# Patient Record
Sex: FEMALE | Race: WHITE | NOT HISPANIC OR LATINO | Employment: FULL TIME | URBAN - METROPOLITAN AREA
[De-identification: names, ages, dates, MRNs, and addresses within clinical notes are randomized per-mention and may not be internally consistent; named-entity substitution may affect disease eponyms.]

---

## 2017-05-08 ENCOUNTER — TELEPHONE (OUTPATIENT)
Dept: MEDICAL GROUP | Facility: PHYSICIAN GROUP | Age: 37
End: 2017-05-08

## 2017-05-08 NOTE — TELEPHONE ENCOUNTER
ESTABLISHED PATIENT PRE-VISIT PLANNING     Note: Patient will not be contacted if there is no indication to call.     1.  Reviewed note from last office visit with PCP and/or other med group provider: Yes    2.  If any orders were placed at last visit, do we have Results/Consult Notes?        •  Labs - Labs ordered, completed and results are in chart.       •  Imaging - Imaging was not ordered at last office visit.       •  Referrals - No referrals were ordered at last office visit.    3.  Immunizations were updated in Epic using WebIZ?: Yes       •  Web Iz Recommendations: Patient is up to date on all vaccines    4.  Patient is due for the following Health Maintenance Topics:   There are no preventive care reminders to display for this patient.

## 2017-05-18 ENCOUNTER — HOSPITAL ENCOUNTER (OUTPATIENT)
Facility: MEDICAL CENTER | Age: 37
End: 2017-05-18
Attending: FAMILY MEDICINE
Payer: COMMERCIAL

## 2017-05-18 ENCOUNTER — OFFICE VISIT (OUTPATIENT)
Dept: MEDICAL GROUP | Facility: PHYSICIAN GROUP | Age: 37
End: 2017-05-18
Payer: COMMERCIAL

## 2017-05-18 VITALS
TEMPERATURE: 99 F | RESPIRATION RATE: 18 BRPM | HEART RATE: 62 BPM | SYSTOLIC BLOOD PRESSURE: 122 MMHG | HEIGHT: 67 IN | OXYGEN SATURATION: 97 % | BODY MASS INDEX: 43.47 KG/M2 | WEIGHT: 277 LBS | DIASTOLIC BLOOD PRESSURE: 86 MMHG

## 2017-05-18 DIAGNOSIS — L65.0 TELOGEN EFFLUVIUM: ICD-10-CM

## 2017-05-18 DIAGNOSIS — L91.8 SKIN TAG: ICD-10-CM

## 2017-05-18 DIAGNOSIS — N89.8 VAGINAL DISCHARGE: ICD-10-CM

## 2017-05-18 DIAGNOSIS — M79.671 BILATERAL FOOT PAIN: ICD-10-CM

## 2017-05-18 DIAGNOSIS — M79.672 BILATERAL FOOT PAIN: ICD-10-CM

## 2017-05-18 DIAGNOSIS — R20.9 SKIN SENSATION DISTURBANCE: ICD-10-CM

## 2017-05-18 DIAGNOSIS — Z30.432 ENCOUNTER FOR IUD REMOVAL: ICD-10-CM

## 2017-05-18 LAB
CANDIDA DNA VAG QL PROBE+SIG AMP: NEGATIVE
G VAGINALIS DNA VAG QL PROBE+SIG AMP: NEGATIVE
T VAGINALIS DNA VAG QL PROBE+SIG AMP: NEGATIVE

## 2017-05-18 PROCEDURE — 87510 GARDNER VAG DNA DIR PROBE: CPT

## 2017-05-18 PROCEDURE — 58301 REMOVE INTRAUTERINE DEVICE: CPT | Performed by: FAMILY MEDICINE

## 2017-05-18 PROCEDURE — 17110 DESTRUCTION B9 LES UP TO 14: CPT | Performed by: FAMILY MEDICINE

## 2017-05-18 PROCEDURE — 87591 N.GONORRHOEAE DNA AMP PROB: CPT

## 2017-05-18 PROCEDURE — 87491 CHLMYD TRACH DNA AMP PROBE: CPT

## 2017-05-18 PROCEDURE — 87660 TRICHOMONAS VAGIN DIR PROBE: CPT

## 2017-05-18 PROCEDURE — 87480 CANDIDA DNA DIR PROBE: CPT

## 2017-05-18 PROCEDURE — 99214 OFFICE O/P EST MOD 30 MIN: CPT | Mod: 25 | Performed by: FAMILY MEDICINE

## 2017-05-18 RX ORDER — ALPRAZOLAM 0.5 MG/1
0.5 TABLET ORAL
COMMUNITY
End: 2017-10-30

## 2017-05-18 ASSESSMENT — PATIENT HEALTH QUESTIONNAIRE - PHQ9: CLINICAL INTERPRETATION OF PHQ2 SCORE: 1

## 2017-05-18 NOTE — MR AVS SNAPSHOT
"        Modesta Coreyruss   2017 8:20 AM   Office Visit   MRN: [id number]    Department:  [facility]   Dept Phone:  [phone]    Description:  Female : 1980   Provider:  Quynh Joel M.D.           Reason for Visit     Foot Pain bilateral foot pain    Alopecia           Allergies as of 2017     Allergen Noted Reactions    Erythromycin 2010       Pcn [Penicillins] 2010   Hives      Vital Signs     Blood Pressure Pulse Temperature Respirations Height Weight    122/86 mmHg 62 37.2 °C (99 °F) 18 1.702 m (5' 7.01\") 125.646 kg (277 lb)    Body Mass Index Oxygen Saturation Smoking Status             43.37 kg/m2 97% Never Smoker          Basic Information     Date Of Birth Sex Race Ethnicity Preferred Language    1980 Female White Non- English      Problem List              ICD-10-CM Priority Class Noted - Resolved    Anxiety and depression F41.9, F32.9   2015 - Present    Ocular migraine G43.109   2015 - Present    Chronic diarrhea K52.9   2015 - Present    Missed menses N92.6   3/16/2016 - Present      Health Maintenance        Date Due Completion Dates    PAP SMEAR 2018, 2015, 2014 (Prv Comp)    Override on 2014: Previously completed (hx of abnormal PAP)    COLONOSCOPY 10/11/2022 10/11/2012    IMM DTaP/Tdap/Td Vaccine (2 - Td) 10/26/2026 10/26/2016            Current Immunizations     MMR/Varicella Combined Vaccine  Incomplete    Tdap Vaccine  Incomplete, 10/26/2016    Tuberculin Skin Test 2011      Below and/or attached are the medications your provider expects you to take. Review all of your home medications and newly ordered medications with your provider and/or pharmacist. Follow medication instructions as directed by your provider and/or pharmacist. Please keep your medication list with you and share with your provider. Update the information when medications are discontinued, doses are changed, or new medications " (including over-the-counter products) are added; and carry medication information at all times in the event of emergency situations     Allergies:  ERYTHROMYCIN - (reactions not documented)     PCN - Hives               Medications  Valid as of: May 18, 2017 -  8:46 AM    Generic Name Brand Name Tablet Size Instructions for use    ALPRAZolam (Tab) XANAX 0.5 MG Take 0.5 mg by mouth at bedtime as needed for Sleep.        FLUoxetine HCl (Cap) PROZAC 10 MG Take 40 mg by mouth 2 times a day.        Hydrocodone-Acetaminophen (Tab) NORCO 5-325 MG Take 1 Tab by mouth every 6 hours as needed.        Oxycodone-Acetaminophen (Tab) PERCOCET 5-325 MG Take 1 Tab by mouth every four hours as needed (pain).        .                 Medicines prescribed today were sent to:     [email or web address] [address] [place] [address], [place] - [address] [facility]    [email or web address] [place] [address]    Phone: [phone] Fax: [phone]    Open 24 Hours?: No      Medication refill instructions:       If your prescription bottle indicates you have medication refills left, it is not necessary to call your provider’s office. Please contact your pharmacy and they will refill your medication.    If your prescription bottle indicates you do not have any refills left, you may request refills at any time through one of the following ways: The online [employer] system (except Urgent Care), by calling your provider’s office, or by asking your pharmacy to contact your provider’s office with a refill request. Medication refills are processed only during regular business hours and may not be available until the next business day. Your provider may request additional information or to have a follow-up visit with you prior to refilling your medication.   *Please Note: Medication refills are assigned a new Rx number when refilled electronically. Your pharmacy may indicate that no refills were authorized even though a new prescription for the same  medication is available at the pharmacy. Please request the medicine by name with the pharmacy before contacting your provider for a refill.           MyChart Access Code: Activation code not generated  Current MyChart Status: Active

## 2017-05-18 NOTE — PROGRESS NOTES
Chief Complaint   Patient presents with   • Foot Pain     bilateral foot pain   • Alopecia       HISTORY OF PRESENT ILLNESS: Patient is a 37 y.o. female established patient here today for the following concerns:    1. Vaginal discharge  Here today with discharge that is mal-odorous.  She reports that she had an IUD placed post-partum in the winter, but has had intermittent bleeding and discomfort in addition to the discharge.  She has appointment to have the IUD removed in one month, but feels she may not be able to wait that long.  No pelvic pain, no fevers.  She is not currently sexually active.     2. Encounter for IUD removal  Requests removal.      3. Skin tag  Small skin tag under the right breast that is catching on clothing.      4. Skin sensation disturbance  As above.     5. Telogen effluvium  Reports post-partum has had significant hair loss globally. Concerned that she is balding.  Not taking any additional supplements or vitamins.     Also reports soreness in the feet bilaterally since child birth/pregnancy last fall.  Weight has been up an additional 20 lbs.  No injuries specifically.  Occasional swelling.  No numbness or tingling.       Past Medical, Social, and Family history reviewed and updated in EPIC    Allergies:Erythromycin and Pcn    Current Outpatient Prescriptions   Medication Sig Dispense Refill   • alprazolam (XANAX) 0.5 MG Tab Take 0.5 mg by mouth at bedtime as needed for Sleep.     • fluoxetine (PROZAC) 10 MG Cap Take 40 mg by mouth 2 times a day.     • hydrocodone-acetaminophen (NORCO) 5-325 MG Tab per tablet Take 1 Tab by mouth every 6 hours as needed. 10 Tab 0   • oxycodone-acetaminophen (PERCOCET) 5-325 MG Tab Take 1 Tab by mouth every four hours as needed (pain). 30 Tab 0     No current facility-administered medications for this visit.         ROS:  Review of Systems   Constitutional: Negative for fever, chills, weight loss and malaise/fatigue.   HENT: Negative for ear pain,  "nosebleeds, congestion, sore throat and neck pain.    Eyes: Negative for blurred vision.   Respiratory: Negative for cough, sputum production, shortness of breath and wheezing.    Cardiovascular: Negative for chest pain, palpitations,  and leg swelling.   Gastrointestinal: Negative for heartburn, nausea, vomiting, diarrhea and abdominal pain.   Genitourinary: Negative for dysuria, urgency and frequency.   Musculoskeletal: Negative for myalgias, back pain and joint pain.   Skin: Negative for rash and itching.   Neurological: Negative for dizziness, tingling, tremors, sensory change, focal weakness and headaches.   Endo/Heme/Allergies: Does not bruise/bleed easily.   Psychiatric/Behavioral: Negative for depression, anxiety, suicidal ideas, insomnia and memory loss.      Exam:  Blood pressure 122/86, pulse 62, temperature 37.2 °C (99 °F), resp. rate 18, height 1.702 m (5' 7.01\"), weight 125.646 kg (277 lb), SpO2 97 %.    General:  Well nourished, well developed in NAD  Head is grossly normal.  Neck: Supple without JVD   Pulmonary:  Normal effort.   Cardiovascular: Regular rate  Extremities: no clubbing, cyanosis, or edema.  Psych: affect appropriate  : speculum exam shows copious yellow-green discharge eminating from the cervical os.  Strings of IUD visible.  IUD easily removed intact.  Cultures obtained. GC/Chl sent.    No CMT.    Skin tag under the right breast fold.    CRYOTHERAPY: With patient's permission. The pedunculated skin tag Lesion was identified and 3 rounds of liquid nitrogen was used in a 30 second freeze thaw cycle.  Patient tolerated the procedure well.     Please note that this dictation was created using voice recognition software. I have made every reasonable attempt to correct obvious errors, but I expect that there are errors of grammar and possibly content that I did not discover before finalizing the note.    Assessment/Plan:  1. Vaginal discharge  Follow BV culture results and GC/CHL.  No " evidence of PID.      2. Encounter for IUD removal  Removed.  Discussed that she is now fertile and should keep her gyn appointment to discuss other options for contraception.    3. Skin tag  Cryo today.     4. Skin sensation disturbance  As above    5. Telogen effluvium  Counseled on normal post-partum hair loss.  Should continue to improve without intervention.    6. Bilateral foot pain  Discussed likely 2/2 to weight changes.  Proper foot wear.  Myofascial release with golf ball.  If not improving may consult with podiatry for inserts.      Follow up prn

## 2017-05-19 LAB
C TRACH DNA SPEC QL NAA+PROBE: NEGATIVE
N GONORRHOEA DNA SPEC QL NAA+PROBE: NEGATIVE
SPECIMEN SOURCE: NORMAL

## 2017-05-30 ENCOUNTER — OFFICE VISIT (OUTPATIENT)
Dept: MEDICAL GROUP | Facility: PHYSICIAN GROUP | Age: 37
End: 2017-05-30
Payer: COMMERCIAL

## 2017-05-30 VITALS
TEMPERATURE: 97.7 F | DIASTOLIC BLOOD PRESSURE: 82 MMHG | OXYGEN SATURATION: 97 % | HEIGHT: 67 IN | WEIGHT: 277 LBS | RESPIRATION RATE: 18 BRPM | HEART RATE: 56 BPM | SYSTOLIC BLOOD PRESSURE: 118 MMHG | BODY MASS INDEX: 43.47 KG/M2

## 2017-05-30 DIAGNOSIS — L91.8 SKIN TAG: ICD-10-CM

## 2017-05-30 PROCEDURE — 99213 OFFICE O/P EST LOW 20 MIN: CPT | Performed by: NURSE PRACTITIONER

## 2017-05-30 RX ORDER — DOXYCYCLINE HYCLATE 100 MG
100 TABLET ORAL 2 TIMES DAILY
Qty: 20 TAB | Refills: 0 | Status: SHIPPED | OUTPATIENT
Start: 2017-05-30 | End: 2017-09-06

## 2017-05-30 NOTE — PROGRESS NOTES
"Subjective:     Chief Complaint   Patient presents with   • Follow-Up     Skin tag infection       HPI:  Modesta Archuleta is a 37 y.o. female here today to discuss the following:    Skin tag  Patient had cryotherapy to a skin tag on her bra line on the right breast in May 18, 2017. Lesion is still intact and is draining foul smelling, light green colored drainage. She denies any fever or chills           Current medicines (including changes today)  Current Outpatient Prescriptions   Medication Sig Dispense Refill   • doxycycline (VIBRAMYCIN) 100 MG Tab Take 1 Tab by mouth 2 times a day. 20 Tab 0   • alprazolam (XANAX) 0.5 MG Tab Take 0.5 mg by mouth at bedtime as needed for Sleep.     • fluoxetine (PROZAC) 10 MG Cap Take 40 mg by mouth 2 times a day.     • hydrocodone-acetaminophen (NORCO) 5-325 MG Tab per tablet Take 1 Tab by mouth every 6 hours as needed. 10 Tab 0   • oxycodone-acetaminophen (PERCOCET) 5-325 MG Tab Take 1 Tab by mouth every four hours as needed (pain). 30 Tab 0     No current facility-administered medications for this visit.       She  has a past medical history of Ocular migraine (2/12/2015); Anxiety and depression (2/12/2015); and Chronic diarrhea (2/12/2015). She also has no past medical history of Diabetes or ASTHMA.    ROS   Review of Systems   Constitutional: Negative for fever, chills, weight loss and malaise/fatigue.   HENT: Negative for ear pain, nosebleeds, congestion, sore throat and neck pain.    Respiratory: Negative for cough, sputum production, shortness of breath and wheezing.    Cardiovascular: Negative for chest pain, palpitations,  and leg swelling.   Gastrointestinal: Negative for heartburn, nausea, vomiting, diarrhea and abdominal pain.   Skin: Positive for skin infection   All other systems reviewed and are negative except as in HPI.     Objective:   Physical Exam:  Blood pressure 118/82, pulse 56, temperature 36.5 °C (97.7 °F), resp. rate 18, height 1.702 m (5' 7.01\"), weight " 125.646 kg (277 lb), SpO2 97 %. Body mass index is 43.37 kg/(m^2).   General:  Well nourished, well developed in NAD  Head is grossly normal.  Neck: Supple without JVD   Pulmonary:  Normal effort.  Cardiovascular: Regular rate   Extremities: no clubbing, cyanosis, or edema.   Skin: Skin tag under right breast erythematous with purulent drainage  Assessment and Plan:   The following treatment plan was discussed   1. Skin tag  doxycycline (VIBRAMYCIN) 100 MG Tab    follow-up at completion of antibiotics for removal of skin tag    Followup: Return if symptoms worsen or fail to improve, for following antibiotics for skin tag removal.   Please note that this dictation was created using voice recognition software. I have made every reasonable attempt to correct obvious errors, but I expect that there are errors of grammar and possibly content that I did not discover before finalizing the note.

## 2017-05-30 NOTE — PATIENT INSTRUCTIONS
Doxycycline tablets or capsules  What is this medicine?  DOXYCYCLINE (dox monet gaines) is a tetracycline antibiotic. It kills certain bacteria or stops their growth. It is used to treat many kinds of infections, like dental, skin, respiratory, and urinary tract infections. It also treats acne, Lyme disease, malaria, and certain sexually transmitted infections.  This medicine may be used for other purposes; ask your health care provider or pharmacist if you have questions.  COMMON BRAND NAME(S): Adoxa CK, Adoxa Jonny, Adoxa TT, Adoxa, Alodox, Avidoxy, Doxal, Monodox, Morgidox 1x Kit, Morgidox 1x, Morgidox 2x , Morgidox 2x Kit, Ocudox , Vibra-Tabs, Vibramycin  What should I tell my health care provider before I take this medicine?  They need to know if you have any of these conditions:  -liver disease  -long exposure to sunlight like working outdoors  -stomach problems like colitis  -an unusual or allergic reaction to doxycycline, tetracycline antibiotics, other medicines, foods, dyes, or preservatives  -pregnant or trying to get pregnant  -breast-feeding  How should I use this medicine?  Take this medicine by mouth with a full glass of water. Follow the directions on the prescription label. It is best to take this medicine without food, but if it upsets your stomach take it with food. Take your medicine at regular intervals. Do not take your medicine more often than directed. Take all of your medicine as directed even if you think you are better. Do not skip doses or stop your medicine early.  Talk to your pediatrician regarding the use of this medicine in children. Special care may be needed. While this drug may be prescribed for children as young as 8 years old for selected conditions, precautions do apply.  Overdosage: If you think you have taken too much of this medicine contact a poison control center or emergency room at once.  NOTE: This medicine is only for you. Do not share this medicine with others.  What if  I miss a dose?  If you miss a dose, take it as soon as you can. If it is almost time for your next dose, take only that dose. Do not take double or extra doses.  What may interact with this medicine?  -antacids  -barbiturates  -birth control pills  -bismuth subsalicylate  -carbamazepine  -methoxyflurane  -other antibiotics  -phenytoin  -vitamins that contain iron  -warfarin  This list may not describe all possible interactions. Give your health care provider a list of all the medicines, herbs, non-prescription drugs, or dietary supplements you use. Also tell them if you smoke, drink alcohol, or use illegal drugs. Some items may interact with your medicine.  What should I watch for while using this medicine?  Tell your doctor or health care professional if your symptoms do not improve.  Do not treat diarrhea with over the counter products. Contact your doctor if you have diarrhea that lasts more than 2 days or if it is severe and watery.  Do not take this medicine just before going to bed. It may not dissolve properly when you lay down and can cause pain in your throat. Drink plenty of fluids while taking this medicine to also help reduce irritation in your throat.  This medicine can make you more sensitive to the sun. Keep out of the sun. If you cannot avoid being in the sun, wear protective clothing and use sunscreen. Do not use sun lamps or tanning beds/booths.  Birth control pills may not work properly while you are taking this medicine. Talk to your doctor about using an extra method of birth control.  If you are being treated for a sexually transmitted infection, avoid sexual contact until you have finished your treatment. Your sexual partner may also need treatment.  Avoid antacids, aluminum, calcium, magnesium, and iron products for 4 hours before and 2 hours after taking a dose of this medicine.  If you are using this medicine to prevent malaria, you should still protect yourself from contact with mosquitos.  Stay in screened-in areas, use mosquito nets, keep your body covered, and use an insect repellent.  What side effects may I notice from receiving this medicine?  Side effects that you should report to your doctor or health care professional as soon as possible:  -allergic reactions like skin rash, itching or hives, swelling of the face, lips, or tongue  -difficulty breathing  -fever  -itching in the rectal or genital area  -pain on swallowing  -redness, blistering, peeling or loosening of the skin, including inside the mouth  -severe stomach pain or cramps  -unusual bleeding or bruising  -unusually weak or tired  -yellowing of the eyes or skin  Side effects that usually do not require medical attention (report to your doctor or health care professional if they continue or are bothersome):  -diarrhea  -loss of appetite  -nausea, vomiting  This list may not describe all possible side effects. Call your doctor for medical advice about side effects. You may report side effects to FDA at 7-618-FDA-4756.  Where should I keep my medicine?  Keep out of the reach of children.  Store at room temperature, below 30 degrees C (86 degrees F). Protect from light. Keep container tightly closed. Throw away any unused medicine after the expiration date. Taking this medicine after the expiration date can make you seriously ill.  NOTE: This sheet is a summary. It may not cover all possible information. If you have questions about this medicine, talk to your doctor, pharmacist, or health care provider.  © 2014, [employer]. (4/7/2009 4:53:02 PM)

## 2017-05-30 NOTE — ASSESSMENT & PLAN NOTE
Patient had cryotherapy to a skin tag on her bra line on the right breast in May 18, 2017. Lesion is still intact and is draining foul smelling, light green colored drainage. She denies any fever or chills

## 2017-05-30 NOTE — MR AVS SNAPSHOT
"        Modesta Coreyruss   2017 11:40 AM   Office Visit   MRN: [id number]    Department:  [facility]   Dept Phone:  [phone]    Description:  Female : 1980   Provider:  LINWOOD Perez           Reason for Visit     Follow-Up Skin tag infection      Allergies as of 2017     Allergen Noted Reactions    Erythromycin 2010       Pcn [Penicillins] 2010   Hives      You were diagnosed with     Skin tag   [2018]         Vital Signs     Blood Pressure Pulse Temperature Respirations Height Weight    118/82 mmHg 56 36.5 °C (97.7 °F) 18 1.702 m (5' 7.01\") 125.646 kg (277 lb)    Body Mass Index Oxygen Saturation Smoking Status             43.37 kg/m2 97% Never Smoker          Basic Information     Date Of Birth Sex Race Ethnicity Preferred Language    1980 Female White Non- English      Problem List              ICD-10-CM Priority Class Noted - Resolved    Anxiety and depression F41.9, F32.9   2015 - Present    Ocular migraine G43.109   2015 - Present    Chronic diarrhea K52.9   2015 - Present    Skin tag L91.8   2017 - Present      Health Maintenance        Date Due Completion Dates    PAP SMEAR 2018, 2015, 2014 (Prv Comp)    Override on 2014: Previously completed (hx of abnormal PAP)    COLONOSCOPY 10/11/2022 10/11/2012    IMM DTaP/Tdap/Td Vaccine (2 - Td) 10/26/2026 10/26/2016            Current Immunizations     MMR/Varicella Combined Vaccine  Incomplete    Tdap Vaccine  Incomplete, 10/26/2016    Tuberculin Skin Test 2011      Below and/or attached are the medications your provider expects you to take. Review all of your home medications and newly ordered medications with your provider and/or pharmacist. Follow medication instructions as directed by your provider and/or pharmacist. Please keep your medication list with you and share with your provider. Update the information when medications are " discontinued, doses are changed, or new medications (including over-the-counter products) are added; and carry medication information at all times in the event of emergency situations     Allergies:  ERYTHROMYCIN - (reactions not documented)     PCN - Hives               Medications  Valid as of: May 30, 2017 -  2:25 PM    Generic Name Brand Name Tablet Size Instructions for use    ALPRAZolam (Tab) XANAX 0.5 MG Take 0.5 mg by mouth at bedtime as needed for Sleep.        Doxycycline Hyclate (Tab) VIBRAMYCIN 100 MG Take 1 Tab by mouth 2 times a day.        FLUoxetine HCl (Cap) PROZAC 10 MG Take 40 mg by mouth 2 times a day.        Hydrocodone-Acetaminophen (Tab) NORCO 5-325 MG Take 1 Tab by mouth every 6 hours as needed.        Oxycodone-Acetaminophen (Tab) PERCOCET 5-325 MG Take 1 Tab by mouth every four hours as needed (pain).        .                 Medicines prescribed today were sent to:     [facility] [address] [place] [address] [address] [facility] [place] [address] Charge Payment [facility] [address]    Phone: [phone] Fax: [phone]    Open 24 Hours?: No      Medication refill instructions:       If your prescription bottle indicates you have medication refills left, it is not necessary to call your provider’s office. Please contact your pharmacy and they will refill your medication.    If your prescription bottle indicates you do not have any refills left, you may request refills at any time through one of the following ways: The online [employer] system (except Urgent Care), by calling your provider’s office, or by asking your pharmacy to contact your provider’s office with a refill request. Medication refills are processed only during regular business hours and may not be available until the next business day. Your provider may request additional information or to have a follow-up visit with you prior to refilling your medication.   *Please Note: Medication refills are assigned a new Rx  number when refilled electronically. Your pharmacy may indicate that no refills were authorized even though a new prescription for the same medication is available at the pharmacy. Please request the medicine by name with the pharmacy before contacting your provider for a refill.        Instructions    Doxycycline tablets or capsules  What is this medicine?  DOXYCYCLINE (dox monet gaines) is a tetracycline antibiotic. It kills certain bacteria or stops their growth. It is used to treat many kinds of infections, like dental, skin, respiratory, and urinary tract infections. It also treats acne, Lyme disease, malaria, and certain sexually transmitted infections.  This medicine may be used for other purposes; ask your health care provider or pharmacist if you have questions.  COMMON BRAND NAME(S): Adoxa CK, Adoxa Jonny, Adoxa TT, Adoxa, Alodox, Avidoxy, Doxal, Monodox, Morgidox 1x Kit, Morgidox 1x, Morgidox 2x , Morgidox 2x Kit, Ocudox , Vibra-Tabs, Vibramycin  What should I tell my health care provider before I take this medicine?  They need to know if you have any of these conditions:  -liver disease  -long exposure to sunlight like working outdoors  -stomach problems like colitis  -an unusual or allergic reaction to doxycycline, tetracycline antibiotics, other medicines, foods, dyes, or preservatives  -pregnant or trying to get pregnant  -breast-feeding  How should I use this medicine?  Take this medicine by mouth with a full glass of water. Follow the directions on the prescription label. It is best to take this medicine without food, but if it upsets your stomach take it with food. Take your medicine at regular intervals. Do not take your medicine more often than directed. Take all of your medicine as directed even if you think you are better. Do not skip doses or stop your medicine early.  Talk to your pediatrician regarding the use of this medicine in children. Special care may be needed. While this drug may be  prescribed for children as young as 8 years old for selected conditions, precautions do apply.  Overdosage: If you think you have taken too much of this medicine contact a poison control center or emergency room at once.  NOTE: This medicine is only for you. Do not share this medicine with others.  What if I miss a dose?  If you miss a dose, take it as soon as you can. If it is almost time for your next dose, take only that dose. Do not take double or extra doses.  What may interact with this medicine?  -antacids  -barbiturates  -birth control pills  -bismuth subsalicylate  -carbamazepine  -methoxyflurane  -other antibiotics  -phenytoin  -vitamins that contain iron  -warfarin  This list may not describe all possible interactions. Give your health care provider a list of all the medicines, herbs, non-prescription drugs, or dietary supplements you use. Also tell them if you smoke, drink alcohol, or use illegal drugs. Some items may interact with your medicine.  What should I watch for while using this medicine?  Tell your doctor or health care professional if your symptoms do not improve.  Do not treat diarrhea with over the counter products. Contact your doctor if you have diarrhea that lasts more than 2 days or if it is severe and watery.  Do not take this medicine just before going to bed. It may not dissolve properly when you lay down and can cause pain in your throat. Drink plenty of fluids while taking this medicine to also help reduce irritation in your throat.  This medicine can make you more sensitive to the sun. Keep out of the sun. If you cannot avoid being in the sun, wear protective clothing and use sunscreen. Do not use sun lamps or tanning beds/booths.  Birth control pills may not work properly while you are taking this medicine. Talk to your doctor about using an extra method of birth control.  If you are being treated for a sexually transmitted infection, avoid sexual contact until you have finished  your treatment. Your sexual partner may also need treatment.  Avoid antacids, aluminum, calcium, magnesium, and iron products for 4 hours before and 2 hours after taking a dose of this medicine.  If you are using this medicine to prevent malaria, you should still protect yourself from contact with mosquitos. Stay in screened-in areas, use mosquito nets, keep your body covered, and use an insect repellent.  What side effects may I notice from receiving this medicine?  Side effects that you should report to your doctor or health care professional as soon as possible:  -allergic reactions like skin rash, itching or hives, swelling of the face, lips, or tongue  -difficulty breathing  -fever  -itching in the rectal or genital area  -pain on swallowing  -redness, blistering, peeling or loosening of the skin, including inside the mouth  -severe stomach pain or cramps  -unusual bleeding or bruising  -unusually weak or tired  -yellowing of the eyes or skin  Side effects that usually do not require medical attention (report to your doctor or health care professional if they continue or are bothersome):  -diarrhea  -loss of appetite  -nausea, vomiting  This list may not describe all possible side effects. Call your doctor for medical advice about side effects. You may report side effects to FDA at 9-603-FDA-3472.  Where should I keep my medicine?  Keep out of the reach of children.  Store at room temperature, below 30 degrees C (86 degrees F). Protect from light. Keep container tightly closed. Throw away any unused medicine after the expiration date. Taking this medicine after the expiration date can make you seriously ill.  NOTE: This sheet is a summary. It may not cover all possible information. If you have questions about this medicine, talk to your doctor, pharmacist, or health care provider.  © 2014, Elsevier/Gold Standard. (4/7/2009 4:53:02 PM)            [employer] Access Code: Activation code not generated  Current  MyChart Status: Active

## 2017-06-11 ENCOUNTER — OFFICE VISIT (OUTPATIENT)
Dept: URGENT CARE | Facility: PHYSICIAN GROUP | Age: 37
End: 2017-06-11
Payer: COMMERCIAL

## 2017-06-11 VITALS
TEMPERATURE: 97.5 F | HEART RATE: 96 BPM | RESPIRATION RATE: 16 BRPM | OXYGEN SATURATION: 97 % | BODY MASS INDEX: 42.28 KG/M2 | WEIGHT: 270 LBS

## 2017-06-11 DIAGNOSIS — K12.0 CANKER SORES ORAL: ICD-10-CM

## 2017-06-11 DIAGNOSIS — J06.9 VIRAL URI WITH COUGH: ICD-10-CM

## 2017-06-11 PROCEDURE — 99214 OFFICE O/P EST MOD 30 MIN: CPT | Performed by: NURSE PRACTITIONER

## 2017-06-11 RX ORDER — ACYCLOVIR 400 MG/1
800 TABLET ORAL 2 TIMES DAILY
Qty: 28 TAB | Refills: 0 | Status: SHIPPED | OUTPATIENT
Start: 2017-06-11 | End: 2017-06-18

## 2017-06-11 ASSESSMENT — ENCOUNTER SYMPTOMS
CHILLS: 0
WHEEZING: 0
RHINORRHEA: 1
SHORTNESS OF BREATH: 0
SORE THROAT: 1
FEVER: 0
SINUS PAIN: 1
SPUTUM PRODUCTION: 0
COUGH: 1

## 2017-06-11 NOTE — MR AVS SNAPSHOT
Modesta Geremias   2017 9:45 AM   Office Visit   MRN: [id number]    Department:  [facility]   Dept Phone:  [phone]    Description:  Female : 1980   Provider:  LINWOOD Morillo           Reason for Visit     Nasal Congestion x 5 days      Allergies as of 2017     Allergen Noted Reactions    Erythromycin 2010       Pcn [Penicillins] 2010   Hives      You were diagnosed with     Canker sores oral   [[id number]]       Viral URI with cough   [098202]         Vital Signs     Pulse Temperature Respirations Weight Oxygen Saturation Smoking Status    96 36.4 °C (97.5 °F) 16 122.471 kg (270 lb) 97% Never Smoker       Basic Information     Date Of Birth Sex Race Ethnicity Preferred Language    1980 Female White Non- English      Your appointments     2017  8:20 AM   Established Patient with LINWOOD Perez   [address] [address] [phone]           You will be receiving a confirmation call a few days before your appointment from our automated call confirmation system.   Please bring to your appointment; a photo ID, copies of your insurance card, all medication bottles and any co-pay that you are responsible for.  Please allow 45-60 minutes to complete your scheduled appointment.              Problem List              ICD-10-CM Priority Class Noted - Resolved    Anxiety and depression F41.9, F32.9   2015 - Present    Ocular migraine G43.109   2015 - Present    Chronic diarrhea K52.9   2015 - Present    Skin tag L91.8   2017 - Present      Health Maintenance        Date Due Completion Dates    PAP SMEAR 2018, 2015, 2014 (Prv Comp)    Override on 2014: Previously completed (hx of abnormal PAP)    COLONOSCOPY 10/11/2022 10/11/2012    IMM DTaP/Tdap/Td Vaccine (2 - Td) 10/26/2026 10/26/2016            Current Immunizations       MMR/Varicella Combined Vaccine  Incomplete    Tdap Vaccine  Incomplete, 10/26/2016    Tuberculin Skin Test 8/30/2011      Below and/or attached are the medications your provider expects you to take. Review all of your home medications and newly ordered medications with your provider and/or pharmacist. Follow medication instructions as directed by your provider and/or pharmacist. Please keep your medication list with you and share with your provider. Update the information when medications are discontinued, doses are changed, or new medications (including over-the-counter products) are added; and carry medication information at all times in the event of emergency situations     Allergies:  ERYTHROMYCIN - (reactions not documented)     PCN - Hives               Medications  Valid as of: June 11, 2017 - 10:23 AM    Generic Name Brand Name Tablet Size Instructions for use    Acyclovir (Tab) ZOVIRAX 400 MG Take 2 Tabs by mouth 2 times a day for 7 days.        ALPRAZolam (Tab) XANAX 0.5 MG Take 0.5 mg by mouth at bedtime as needed for Sleep.        Doxycycline Hyclate (Tab) VIBRAMYCIN 100 MG Take 1 Tab by mouth 2 times a day.        FLUoxetine HCl (Cap) PROZAC 10 MG Take 40 mg by mouth 2 times a day.        Hydrocodone-Acetaminophen (Tab) NORCO 5-325 MG Take 1 Tab by mouth every 6 hours as needed.        Oxycodone-Acetaminophen (Tab) PERCOCET 5-325 MG Take 1 Tab by mouth every four hours as needed (pain).        .                 Medicines prescribed today were sent to:     [facility] [id number] [place] [address] - [facility]    [address] [facility] [address]    Phone: [phone] Fax: [phone]    Open 24 Hours?: No      Medication refill instructions:       If your prescription bottle indicates you have medication refills left, it is not necessary to call your provider’s office. Please contact your pharmacy and they will refill your medication.    If your prescription bottle  indicates you do not have any refills left, you may request refills at any time through one of the following ways: The online [employer] system (except Urgent Care), by calling your provider’s office, or by asking your pharmacy to contact your provider’s office with a refill request. Medication refills are processed only during regular business hours and may not be available until the next business day. Your provider may request additional information or to have a follow-up visit with you prior to refilling your medication.   *Please Note: Medication refills are assigned a new Rx number when refilled electronically. Your pharmacy may indicate that no refills were authorized even though a new prescription for the same medication is available at the pharmacy. Please request the medicine by name with the pharmacy before contacting your provider for a refill.           [employer] Access Code: Activation code not generated  Current [employer] Status: Active

## 2017-06-11 NOTE — PROGRESS NOTES
Subjective:      Modesta Archuleta is a 37 y.o. female who presents with Nasal Congestion            URI   This is a new problem. Episode onset: 5 days ago. Her son has also been sick, she thinks she caught it from him. The problem has been gradually worsening. There has been no fever. Associated symptoms include congestion, coughing, rhinorrhea, sinus pain, sneezing and a sore throat. Pertinent negatives include no wheezing. Associated symptoms comments: She also reports a canker sore has developed in the last two days on the inside of her lower lip that is very painful. She is also currently on doxycycline for a skin tag removal from her PCP. She has tried antihistamine, decongestant and NSAIDs for the symptoms. The treatment provided mild relief.       Review of Systems   Constitutional: Positive for malaise/fatigue. Negative for fever and chills.   HENT: Positive for congestion, rhinorrhea, sneezing and sore throat.    Respiratory: Positive for cough. Negative for sputum production, shortness of breath and wheezing.    All other systems reviewed and are negative.    Past Medical History   Diagnosis Date   • Ocular migraine 2/12/2015   • Anxiety and depression 2/12/2015   • Chronic diarrhea 2/12/2015      Past Surgical History   Procedure Laterality Date   • Arthroscopy, knee     • Tonsillectomy     • Primary c section  11/13/2016     Procedure: PRIMARY C SECTION;  Surgeon: Delphine Carbone M.D.;  Location: LABOR AND DELIVERY;  Service:       Social History     Social History   • Marital Status: Single     Spouse Name: N/A   • Number of Children: N/A   • Years of Education: N/A     Occupational History   • Not on file.     Social History Main Topics   • Smoking status: Never Smoker    • Smokeless tobacco: Never Used   • Alcohol Use: Yes      Comment: socially    • Drug Use: No   • Sexual Activity: Not on file      Comment:      Other Topics Concern   • Not on file     Social History Narrative           Objective:     Pulse 96  Temp(Src) 36.4 °C (97.5 °F)  Resp 16  Wt 122.471 kg (270 lb)  SpO2 97%     Physical Exam   Constitutional: She is oriented to person, place, and time. Vital signs are normal. She appears well-developed and well-nourished.   HENT:   Head: Normocephalic and atraumatic.   Right Ear: Tympanic membrane and external ear normal.   Left Ear: Tympanic membrane and external ear normal.   Nose: Rhinorrhea present.   Mouth/Throat: Oral lesions present. Posterior oropharyngeal erythema present.   Single, large oral lesion anterior buccal mucosa, lower lip   Eyes: EOM are normal. Pupils are equal, round, and reactive to light.   Neck: Normal range of motion.   Cardiovascular: Normal rate and regular rhythm.    Pulmonary/Chest: Effort normal and breath sounds normal.   Musculoskeletal: Normal range of motion.   Lymphadenopathy:        Head (right side): Submandibular adenopathy present.        Head (left side): Submandibular adenopathy present.   Neurological: She is alert and oriented to person, place, and time.   Skin: Skin is warm and dry.   Psychiatric: She has a normal mood and affect. Her speech is normal and behavior is normal. Thought content normal.   Vitals reviewed.              Assessment/Plan:     1. Viral URI with cough    2. Canker sores oral  - acyclovir (ZOVIRAX) 400 MG tablet; Take 2 Tabs by mouth 2 times a day for 7 days.  Dispense: 28 Tab; Refill: 0    Continue OTC cold remedies  May also use oral ambesol for pain relief r/t canker sore  Warm salt water gargles  Drink plenty of water  Supportive care, differential diagnoses, and indications for immediate follow-up discussed with patient.    Pathogenesis of diagnosis discussed including typical length and natural progression.      Instructed to return to  or nearest emergency department if symptoms fail to improve, for any change in condition, further concerns, or new concerning symptoms.  Patient states understanding of the plan  of care and discharge instructions.

## 2017-06-14 ENCOUNTER — OFFICE VISIT (OUTPATIENT)
Dept: MEDICAL GROUP | Facility: PHYSICIAN GROUP | Age: 37
End: 2017-06-14
Payer: COMMERCIAL

## 2017-06-14 ENCOUNTER — HOSPITAL ENCOUNTER (OUTPATIENT)
Dept: RADIOLOGY | Facility: MEDICAL CENTER | Age: 37
End: 2017-06-14
Attending: NURSE PRACTITIONER
Payer: COMMERCIAL

## 2017-06-14 VITALS
DIASTOLIC BLOOD PRESSURE: 82 MMHG | WEIGHT: 274.6 LBS | HEIGHT: 67 IN | TEMPERATURE: 98.2 F | HEART RATE: 72 BPM | RESPIRATION RATE: 16 BRPM | BODY MASS INDEX: 43.1 KG/M2 | SYSTOLIC BLOOD PRESSURE: 126 MMHG | OXYGEN SATURATION: 98 %

## 2017-06-14 DIAGNOSIS — M79.672 LEFT FOOT PAIN: ICD-10-CM

## 2017-06-14 DIAGNOSIS — L91.8 SKIN TAG: ICD-10-CM

## 2017-06-14 PROCEDURE — 99214 OFFICE O/P EST MOD 30 MIN: CPT | Performed by: NURSE PRACTITIONER

## 2017-06-14 PROCEDURE — 73630 X-RAY EXAM OF FOOT: CPT | Mod: LT

## 2017-06-14 NOTE — PROGRESS NOTES
Subjective:     Chief Complaint   Patient presents with   • Other     skin tag removal    • Orders Needed     Dr Dinero        HPI:  Modesta Archuleta is a 37 y.o. female here today to discuss the following:    Left foot pain  Patient reports left pain for more than one year, that started when she was pregnant. Pain increases with walking and is worse in the morning after resting. She denies any trauma or injury and is aware that increased weight contributes to her pain. She is requesting a referral to Dr. Dinero in podiatry          Skin tag  Patient still has small skin tag under her right breast which was previously treated with cryotherapy and became infected. Skin has healed well and tag has reduced in size. She is not going to seek further treatment at this time         Current medicines (including changes today)  Current Outpatient Prescriptions   Medication Sig Dispense Refill   • acyclovir (ZOVIRAX) 400 MG tablet Take 2 Tabs by mouth 2 times a day for 7 days. 28 Tab 0   • doxycycline (VIBRAMYCIN) 100 MG Tab Take 1 Tab by mouth 2 times a day. 20 Tab 0   • alprazolam (XANAX) 0.5 MG Tab Take 0.5 mg by mouth at bedtime as needed for Sleep.     • fluoxetine (PROZAC) 10 MG Cap Take 40 mg by mouth 2 times a day.     • hydrocodone-acetaminophen (NORCO) 5-325 MG Tab per tablet Take 1 Tab by mouth every 6 hours as needed. 10 Tab 0   • oxycodone-acetaminophen (PERCOCET) 5-325 MG Tab Take 1 Tab by mouth every four hours as needed (pain). 30 Tab 0     No current facility-administered medications for this visit.       She  has a past medical history of Ocular migraine (2/12/2015); Anxiety and depression (2/12/2015); and Chronic diarrhea (2/12/2015). She also has no past medical history of Diabetes or ASTHMA.    ROS   Review of Systems   Constitutional: Negative for fever, chills, weight loss and malaise/fatigue.   HENT: Negative for ear pain, nosebleeds, congestion, sore throat and neck pain.    Respiratory: Negative for  "cough, sputum production, shortness of breath and wheezing.    Cardiovascular: Negative for chest pain, palpitations,  and leg swelling.   MS Left foot pain  Skin: Skin infection has resolved and skin tag is smaller in size  Neurological: Negative for dizziness, tingling, tremors, sensory change, focal weakness and headaches.   All other systems reviewed and are negative except as in HPI.     Objective:   Physical Exam:  Blood pressure 126/82, pulse 72, temperature 36.8 °C (98.2 °F), resp. rate 16, height 1.702 m (5' 7.01\"), weight 124.558 kg (274 lb 9.6 oz), SpO2 98 %. Body mass index is 43 kg/(m^2).   Physical Exam:  Alert, oriented in no acute distress.  Eye contact is good, speech goal directed, affect calm  HEENT: conjunctiva non-injected, sclera non-icteric.  Pinna normal.   Neck No adenopathy or masses in the neck or supraclavicular regions.  Lungs: clear to auscultation bilaterally with good excursion.  CV: regular rate and rhythm.   Abdomen: soft, nontender, No CVAT. Normal bowel sounds.  Ext: no edema, color normal, vascularity normal, temperature normal  MS: Normal gait and station   Left foot pain increases with inversion and dorsiflexion. No pain with rotation  Skin: No erythema, edema or drainage.  Assessment and Plan:   The following treatment plan was discussed   1. Left foot pain  REFERRAL TO PODIATRY    DX-FOOT-COMPLETE 3+ LEFT   2. Skin tag         Followup: Return if symptoms worsen or fail to improve.   Please note that this dictation was created using voice recognition software. I have made every reasonable attempt to correct obvious errors, but I expect that there are errors of grammar and possibly content that I did not discover before finalizing the note.             "

## 2017-06-14 NOTE — MR AVS SNAPSHOT
"        Modesta Geremias   2017 10:40 AM   Office Visit   MRN: [id number]    Department:  [facility]   Dept Phone:  [phone]    Description:  Female : 1980   Provider:  LINWOOD Perez           Reason for Visit     Other skin tag removal     Orders Needed Dr Dinero       Allergies as of 2017     Allergen Noted Reactions    Erythromycin 2010       Pcn [Penicillins] 2010   Hives      You were diagnosed with     Left foot pain   [[id number]]   X-ray left foot. Referral to podiatry    Skin tag   [[id number]]         Vital Signs     Blood Pressure Pulse Temperature Respirations Height Weight    126/82 mmHg 72 36.8 °C (98.2 °F) 16 1.702 m (5' 7.01\") 124.558 kg (274 lb 9.6 oz)    Body Mass Index Oxygen Saturation Smoking Status             43.00 kg/m2 98% Never Smoker          Basic Information     Date Of Birth Sex Race Ethnicity Preferred Language    1980 Female White Non- English      Problem List              ICD-10-CM Priority Class Noted - Resolved    Anxiety and depression F41.9, F32.9   2015 - Present    Ocular migraine G43.109   2015 - Present    Chronic diarrhea K52.9   2015 - Present    Skin tag L91.8   2017 - Present    Left foot pain M79.672   2017 - Present      Health Maintenance        Date Due Completion Dates    PAP SMEAR 2018, 2015, 2014 (Prv Comp)    Override on 2014: Previously completed (hx of abnormal PAP)    COLONOSCOPY 10/11/2022 10/11/2012    IMM DTaP/Tdap/Td Vaccine (2 - Td) 10/26/2026 10/26/2016            Current Immunizations     MMR/Varicella Combined Vaccine  Incomplete    Tdap Vaccine  Incomplete, 10/26/2016    Tuberculin Skin Test 2011      Below and/or attached are the medications your provider expects you to take. Review all of your home medications and newly ordered medications with your provider and/or pharmacist. Follow medication instructions as directed by your " provider and/or pharmacist. Please keep your medication list with you and share with your provider. Update the information when medications are discontinued, doses are changed, or new medications (including over-the-counter products) are added; and carry medication information at all times in the event of emergency situations     Allergies:  ERYTHROMYCIN - (reactions not documented)     PCN - Hives               Medications  Valid as of: June 14, 2017 -  1:26 PM    Generic Name Brand Name Tablet Size Instructions for use    Acyclovir (Tab) ZOVIRAX 400 MG Take 2 Tabs by mouth 2 times a day for 7 days.        ALPRAZolam (Tab) XANAX 0.5 MG Take 0.5 mg by mouth at bedtime as needed for Sleep.        Doxycycline Hyclate (Tab) VIBRAMYCIN 100 MG Take 1 Tab by mouth 2 times a day.        FLUoxetine HCl (Cap) PROZAC 10 MG Take 40 mg by mouth 2 times a day.        Hydrocodone-Acetaminophen (Tab) NORCO 5-325 MG Take 1 Tab by mouth every 6 hours as needed.        Oxycodone-Acetaminophen (Tab) PERCOCET 5-325 MG Take 1 Tab by mouth every four hours as needed (pain).        .                 Medicines prescribed today were sent to:     [facility] [id number] [place] [address], [place] - [address] [facility]    [address] [facility] [place] [address]    Phone: [phone] Fax: [phone]    Open 24 Hours?: No      Medication refill instructions:       If your prescription bottle indicates you have medication refills left, it is not necessary to call your provider’s office. Please contact your pharmacy and they will refill your medication.    If your prescription bottle indicates you do not have any refills left, you may request refills at any time through one of the following ways: The online [employer] system (except Urgent Care), by calling your provider’s office, or by asking your pharmacy to contact your provider’s office with a refill request. Medication refills are processed only during regular business hours and may  not be available until the next business day. Your provider may request additional information or to have a follow-up visit with you prior to refilling your medication.   *Please Note: Medication refills are assigned a new Rx number when refilled electronically. Your pharmacy may indicate that no refills were authorized even though a new prescription for the same medication is available at the pharmacy. Please request the medicine by name with the pharmacy before contacting your provider for a refill.        Your To Do List     Future Labs/Procedures Complete By Expires    DX-FOOT-COMPLETE 3+ LEFT  As directed 12/15/2017      Referral     A referral request has been sent to our patient care coordination department. Please allow 3-5 business days for us to process this request and contact you either by phone or mail. If you do not hear from us by the 5th business day, please call us at [phone].           [employer] Protective Access Code: Activation code not generated  Current [employer] Protective Status: Active

## 2017-06-14 NOTE — ASSESSMENT & PLAN NOTE
Patient still has small skin tag under her right breast which was previously treated with cryotherapy and became infected. Skin has healed well and tag has reduced in size. She is not going to seek further treatment at this time

## 2017-06-14 NOTE — ASSESSMENT & PLAN NOTE
Patient reports left pain for more than one year, that started when she was pregnant. Pain increases with walking and is worse in the morning after resting. She denies any trauma or injury and is aware that increased weight contributes to her pain. She is requesting a referral to Dr. Dinero in podiatry

## 2017-09-06 ENCOUNTER — HOSPITAL ENCOUNTER (EMERGENCY)
Facility: MEDICAL CENTER | Age: 37
End: 2017-09-08
Payer: COMMERCIAL

## 2017-09-06 ENCOUNTER — RESOLUTE PROFESSIONAL BILLING HOSPITAL PROF FEE (OUTPATIENT)
Dept: HOSPITALIST | Facility: MEDICAL CENTER | Age: 37
End: 2017-09-06
Payer: COMMERCIAL

## 2017-09-06 ENCOUNTER — APPOINTMENT (OUTPATIENT)
Dept: RADIOLOGY | Facility: MEDICAL CENTER | Age: 37
End: 2017-09-06
Attending: EMERGENCY MEDICINE
Payer: COMMERCIAL

## 2017-09-06 ENCOUNTER — HOSPITAL ENCOUNTER (INPATIENT)
Facility: MEDICAL CENTER | Age: 37
LOS: 2 days | DRG: 204 | End: 2017-09-08
Attending: HOSPITALIST | Admitting: HOSPITALIST
Payer: COMMERCIAL

## 2017-09-06 ENCOUNTER — HOSPITAL ENCOUNTER (EMERGENCY)
Facility: MEDICAL CENTER | Age: 37
End: 2017-09-06
Attending: EMERGENCY MEDICINE
Payer: COMMERCIAL

## 2017-09-06 VITALS
HEART RATE: 64 BPM | TEMPERATURE: 97.2 F | RESPIRATION RATE: 25 BRPM | HEIGHT: 67 IN | OXYGEN SATURATION: 98 % | SYSTOLIC BLOOD PRESSURE: 149 MMHG | DIASTOLIC BLOOD PRESSURE: 93 MMHG | BODY MASS INDEX: 42.7 KG/M2 | WEIGHT: 272.05 LBS

## 2017-09-06 DIAGNOSIS — R06.09 DOE (DYSPNEA ON EXERTION): ICD-10-CM

## 2017-09-06 DIAGNOSIS — R79.89 ELEVATED TROPONIN: ICD-10-CM

## 2017-09-06 PROBLEM — E87.6 HYPOKALEMIA: Status: ACTIVE | Noted: 2017-09-06

## 2017-09-06 PROBLEM — A08.4 VIRAL GASTROENTERITIS: Status: ACTIVE | Noted: 2017-09-06

## 2017-09-06 PROBLEM — E83.39 HYPOPHOSPHATEMIA: Status: ACTIVE | Noted: 2017-09-06

## 2017-09-06 LAB
ALBUMIN SERPL BCP-MCNC: 3.6 G/DL (ref 3.2–4.9)
ALBUMIN/GLOB SERPL: 1.1 G/DL
ALP SERPL-CCNC: 85 U/L (ref 30–99)
ALT SERPL-CCNC: 21 U/L (ref 2–50)
ANION GAP SERPL CALC-SCNC: 9 MMOL/L (ref 0–11.9)
APTT PPP: 21.7 SEC (ref 24.7–36)
AST SERPL-CCNC: 16 U/L (ref 12–45)
BASOPHILS # BLD AUTO: 0.5 % (ref 0–1.8)
BASOPHILS # BLD: 0.06 K/UL (ref 0–0.12)
BILIRUB SERPL-MCNC: 0.5 MG/DL (ref 0.1–1.5)
BNP SERPL-MCNC: 243 PG/ML (ref 0–100)
BUN SERPL-MCNC: 12 MG/DL (ref 8–22)
CALCIUM SERPL-MCNC: 8.3 MG/DL (ref 8.4–10.2)
CHLORIDE SERPL-SCNC: 106 MMOL/L (ref 96–112)
CO2 SERPL-SCNC: 24 MMOL/L (ref 20–33)
CREAT SERPL-MCNC: 0.95 MG/DL (ref 0.5–1.4)
DEPRECATED D DIMER PPP IA-ACNC: <200 NG/ML(D-DU)
EKG IMPRESSION: NORMAL
EOSINOPHIL # BLD AUTO: 0.4 K/UL (ref 0–0.51)
EOSINOPHIL NFR BLD: 3.4 % (ref 0–6.9)
ERYTHROCYTE [DISTWIDTH] IN BLOOD BY AUTOMATED COUNT: 44 FL (ref 35.9–50)
GFR SERPL CREATININE-BSD FRML MDRD: >60 ML/MIN/1.73 M 2
GLOBULIN SER CALC-MCNC: 3.4 G/DL (ref 1.9–3.5)
GLUCOSE SERPL-MCNC: 90 MG/DL (ref 65–99)
HCG SERPL QL: NEGATIVE
HCT VFR BLD AUTO: 39.6 % (ref 37–47)
HGB BLD-MCNC: 13.2 G/DL (ref 12–16)
IMM GRANULOCYTES # BLD AUTO: 0.04 K/UL (ref 0–0.11)
IMM GRANULOCYTES NFR BLD AUTO: 0.3 % (ref 0–0.9)
INR PPP: 0.94 (ref 0.87–1.13)
LIPASE SERPL-CCNC: 18 U/L (ref 7–58)
LYMPHOCYTES # BLD AUTO: 2.59 K/UL (ref 1–4.8)
LYMPHOCYTES NFR BLD: 22.2 % (ref 22–41)
MAGNESIUM SERPL-MCNC: 1.9 MG/DL (ref 1.5–2.5)
MCH RBC QN AUTO: 29.3 PG (ref 27–33)
MCHC RBC AUTO-ENTMCNC: 33.3 G/DL (ref 33.6–35)
MCV RBC AUTO: 87.8 FL (ref 81.4–97.8)
MONOCYTES # BLD AUTO: 0.73 K/UL (ref 0–0.85)
MONOCYTES NFR BLD AUTO: 6.2 % (ref 0–13.4)
NEUTROPHILS # BLD AUTO: 7.87 K/UL (ref 2–7.15)
NEUTROPHILS NFR BLD: 67.4 % (ref 44–72)
NRBC # BLD AUTO: 0 K/UL
NRBC BLD AUTO-RTO: 0 /100 WBC
PHOSPHATE SERPL-MCNC: 2.4 MG/DL (ref 2.5–4.5)
PLATELET # BLD AUTO: 305 K/UL (ref 164–446)
PMV BLD AUTO: 10 FL (ref 9–12.9)
POTASSIUM SERPL-SCNC: 3.3 MMOL/L (ref 3.6–5.5)
PROT SERPL-MCNC: 7 G/DL (ref 6–8.2)
PROTHROMBIN TIME: 12.4 SEC (ref 12–14.6)
RBC # BLD AUTO: 4.51 M/UL (ref 4.2–5.4)
SODIUM SERPL-SCNC: 139 MMOL/L (ref 135–145)
T4 FREE SERPL-MCNC: 0.73 NG/DL (ref 0.58–1.64)
TROPONIN I SERPL-MCNC: 0.13 NG/ML (ref 0–0.04)
TSH SERPL DL<=0.005 MIU/L-ACNC: 2.5 UIU/ML (ref 0.35–5.5)
WBC # BLD AUTO: 11.7 K/UL (ref 4.8–10.8)

## 2017-09-06 PROCEDURE — 84443 ASSAY THYROID STIM HORMONE: CPT

## 2017-09-06 PROCEDURE — 84100 ASSAY OF PHOSPHORUS: CPT

## 2017-09-06 PROCEDURE — 96361 HYDRATE IV INFUSION ADD-ON: CPT

## 2017-09-06 PROCEDURE — 94760 N-INVAS EAR/PLS OXIMETRY 1: CPT

## 2017-09-06 PROCEDURE — 85025 COMPLETE CBC W/AUTO DIFF WBC: CPT

## 2017-09-06 PROCEDURE — 84484 ASSAY OF TROPONIN QUANT: CPT

## 2017-09-06 PROCEDURE — 36415 COLL VENOUS BLD VENIPUNCTURE: CPT

## 2017-09-06 PROCEDURE — 83690 ASSAY OF LIPASE: CPT

## 2017-09-06 PROCEDURE — 85610 PROTHROMBIN TIME: CPT

## 2017-09-06 PROCEDURE — 96360 HYDRATION IV INFUSION INIT: CPT

## 2017-09-06 PROCEDURE — 700105 HCHG RX REV CODE 258: Performed by: EMERGENCY MEDICINE

## 2017-09-06 PROCEDURE — 93005 ELECTROCARDIOGRAM TRACING: CPT | Performed by: EMERGENCY MEDICINE

## 2017-09-06 PROCEDURE — 700102 HCHG RX REV CODE 250 W/ 637 OVERRIDE(OP): Performed by: HOSPITALIST

## 2017-09-06 PROCEDURE — 80053 COMPREHEN METABOLIC PANEL: CPT

## 2017-09-06 PROCEDURE — 71010 DX-CHEST-PORTABLE (1 VIEW): CPT

## 2017-09-06 PROCEDURE — 85379 FIBRIN DEGRADATION QUANT: CPT

## 2017-09-06 PROCEDURE — 83880 ASSAY OF NATRIURETIC PEPTIDE: CPT

## 2017-09-06 PROCEDURE — 93005 ELECTROCARDIOGRAM TRACING: CPT | Performed by: HOSPITALIST

## 2017-09-06 PROCEDURE — 304562 HCHG STAT O2 MASK/CANNULA

## 2017-09-06 PROCEDURE — 93010 ELECTROCARDIOGRAM REPORT: CPT | Performed by: INTERNAL MEDICINE

## 2017-09-06 PROCEDURE — 85730 THROMBOPLASTIN TIME PARTIAL: CPT

## 2017-09-06 PROCEDURE — A9270 NON-COVERED ITEM OR SERVICE: HCPCS | Performed by: HOSPITALIST

## 2017-09-06 PROCEDURE — 93306 TTE W/DOPPLER COMPLETE: CPT | Mod: 26 | Performed by: INTERNAL MEDICINE

## 2017-09-06 PROCEDURE — 700111 HCHG RX REV CODE 636 W/ 250 OVERRIDE (IP): Performed by: HOSPITALIST

## 2017-09-06 PROCEDURE — 99222 1ST HOSP IP/OBS MODERATE 55: CPT | Performed by: HOSPITALIST

## 2017-09-06 PROCEDURE — 83735 ASSAY OF MAGNESIUM: CPT

## 2017-09-06 PROCEDURE — 770020 HCHG ROOM/CARE - TELE (206)

## 2017-09-06 PROCEDURE — 84703 CHORIONIC GONADOTROPIN ASSAY: CPT

## 2017-09-06 PROCEDURE — 84439 ASSAY OF FREE THYROXINE: CPT

## 2017-09-06 PROCEDURE — 99285 EMERGENCY DEPT VISIT HI MDM: CPT

## 2017-09-06 RX ORDER — ASPIRIN 600 MG/1
300 SUPPOSITORY RECTAL DAILY
Status: DISCONTINUED | OUTPATIENT
Start: 2017-09-06 | End: 2017-09-06 | Stop reason: HOSPADM

## 2017-09-06 RX ORDER — HEPARIN SODIUM 5000 [USP'U]/ML
5000 INJECTION, SOLUTION INTRAVENOUS; SUBCUTANEOUS EVERY 8 HOURS
Status: DISCONTINUED | OUTPATIENT
Start: 2017-09-06 | End: 2017-09-07

## 2017-09-06 RX ORDER — LORAZEPAM 1 MG/1
0.5 TABLET ORAL EVERY 6 HOURS PRN
Status: DISCONTINUED | OUTPATIENT
Start: 2017-09-06 | End: 2017-09-06 | Stop reason: HOSPADM

## 2017-09-06 RX ORDER — ACETAMINOPHEN 325 MG/1
650 TABLET ORAL EVERY 6 HOURS PRN
Status: DISCONTINUED | OUTPATIENT
Start: 2017-09-06 | End: 2017-09-06 | Stop reason: HOSPADM

## 2017-09-06 RX ORDER — POTASSIUM CHLORIDE 20 MEQ/1
40 TABLET, EXTENDED RELEASE ORAL DAILY
Status: DISCONTINUED | OUTPATIENT
Start: 2017-09-06 | End: 2017-09-06 | Stop reason: HOSPADM

## 2017-09-06 RX ORDER — ASPIRIN 81 MG/1
324 TABLET, CHEWABLE ORAL DAILY
Status: DISCONTINUED | OUTPATIENT
Start: 2017-09-06 | End: 2017-09-06 | Stop reason: HOSPADM

## 2017-09-06 RX ORDER — HEPARIN SODIUM 5000 [USP'U]/ML
5000 INJECTION, SOLUTION INTRAVENOUS; SUBCUTANEOUS EVERY 8 HOURS
Status: DISCONTINUED | OUTPATIENT
Start: 2017-09-06 | End: 2017-09-06 | Stop reason: HOSPADM

## 2017-09-06 RX ORDER — ASPIRIN 81 MG/1
324 TABLET, CHEWABLE ORAL DAILY
Status: DISCONTINUED | OUTPATIENT
Start: 2017-09-07 | End: 2017-09-09 | Stop reason: HOSPADM

## 2017-09-06 RX ORDER — ASPIRIN 600 MG/1
300 SUPPOSITORY RECTAL DAILY
Status: CANCELLED | OUTPATIENT
Start: 2017-09-06

## 2017-09-06 RX ORDER — AMOXICILLIN 250 MG
2 CAPSULE ORAL 2 TIMES DAILY
Status: CANCELLED | OUTPATIENT
Start: 2017-09-06

## 2017-09-06 RX ORDER — PROMETHAZINE HYDROCHLORIDE 25 MG/1
12.5-25 SUPPOSITORY RECTAL EVERY 4 HOURS PRN
Status: DISCONTINUED | OUTPATIENT
Start: 2017-09-06 | End: 2017-09-09 | Stop reason: HOSPADM

## 2017-09-06 RX ORDER — AMOXICILLIN 250 MG
2 CAPSULE ORAL 2 TIMES DAILY
Status: DISCONTINUED | OUTPATIENT
Start: 2017-09-06 | End: 2017-09-09 | Stop reason: HOSPADM

## 2017-09-06 RX ORDER — FLUOXETINE HYDROCHLORIDE 20 MG/1
80 CAPSULE ORAL DAILY
Status: CANCELLED | OUTPATIENT
Start: 2017-09-06

## 2017-09-06 RX ORDER — PROMETHAZINE HYDROCHLORIDE 25 MG/1
12.5-25 TABLET ORAL EVERY 4 HOURS PRN
Status: DISCONTINUED | OUTPATIENT
Start: 2017-09-06 | End: 2017-09-09 | Stop reason: HOSPADM

## 2017-09-06 RX ORDER — ASPIRIN 300 MG/1
300 SUPPOSITORY RECTAL DAILY
Status: DISCONTINUED | OUTPATIENT
Start: 2017-09-07 | End: 2017-09-09 | Stop reason: HOSPADM

## 2017-09-06 RX ORDER — FLUOXETINE HYDROCHLORIDE 40 MG/1
80 CAPSULE ORAL DAILY
COMMUNITY

## 2017-09-06 RX ORDER — POLYETHYLENE GLYCOL 3350 17 G/17G
1 POWDER, FOR SOLUTION ORAL
Status: DISCONTINUED | OUTPATIENT
Start: 2017-09-06 | End: 2017-09-09 | Stop reason: HOSPADM

## 2017-09-06 RX ORDER — SODIUM CHLORIDE 9 MG/ML
1000 INJECTION, SOLUTION INTRAVENOUS ONCE
Status: COMPLETED | OUTPATIENT
Start: 2017-09-06 | End: 2017-09-06

## 2017-09-06 RX ORDER — ASPIRIN 325 MG
325 TABLET ORAL DAILY
Status: CANCELLED | OUTPATIENT
Start: 2017-09-06

## 2017-09-06 RX ORDER — ONDANSETRON 4 MG/1
4 TABLET, ORALLY DISINTEGRATING ORAL EVERY 4 HOURS PRN
Status: CANCELLED | OUTPATIENT
Start: 2017-09-06

## 2017-09-06 RX ORDER — ONDANSETRON 4 MG/1
4 TABLET, ORALLY DISINTEGRATING ORAL EVERY 4 HOURS PRN
Status: DISCONTINUED | OUTPATIENT
Start: 2017-09-06 | End: 2017-09-06 | Stop reason: HOSPADM

## 2017-09-06 RX ORDER — ASPIRIN 325 MG
325 TABLET ORAL DAILY
Status: DISCONTINUED | OUTPATIENT
Start: 2017-09-06 | End: 2017-09-06 | Stop reason: HOSPADM

## 2017-09-06 RX ORDER — FLUOXETINE HYDROCHLORIDE 20 MG/1
80 CAPSULE ORAL DAILY
Status: DISCONTINUED | OUTPATIENT
Start: 2017-09-06 | End: 2017-09-06 | Stop reason: HOSPADM

## 2017-09-06 RX ORDER — ONDANSETRON 4 MG/1
4 TABLET, ORALLY DISINTEGRATING ORAL EVERY 4 HOURS PRN
Status: DISCONTINUED | OUTPATIENT
Start: 2017-09-06 | End: 2017-09-09 | Stop reason: HOSPADM

## 2017-09-06 RX ORDER — AMOXICILLIN 250 MG
2 CAPSULE ORAL 2 TIMES DAILY
Status: DISCONTINUED | OUTPATIENT
Start: 2017-09-06 | End: 2017-09-06 | Stop reason: HOSPADM

## 2017-09-06 RX ORDER — POLYETHYLENE GLYCOL 3350 17 G/17G
1 POWDER, FOR SOLUTION ORAL
Status: CANCELLED | OUTPATIENT
Start: 2017-09-06

## 2017-09-06 RX ORDER — LORAZEPAM 2 MG/ML
0.5 INJECTION INTRAMUSCULAR EVERY 6 HOURS PRN
Status: DISCONTINUED | OUTPATIENT
Start: 2017-09-06 | End: 2017-09-09 | Stop reason: HOSPADM

## 2017-09-06 RX ORDER — PROMETHAZINE HYDROCHLORIDE 25 MG/1
12.5-25 SUPPOSITORY RECTAL EVERY 4 HOURS PRN
Status: DISCONTINUED | OUTPATIENT
Start: 2017-09-06 | End: 2017-09-06 | Stop reason: HOSPADM

## 2017-09-06 RX ORDER — ONDANSETRON 2 MG/ML
4 INJECTION INTRAMUSCULAR; INTRAVENOUS EVERY 4 HOURS PRN
Status: DISCONTINUED | OUTPATIENT
Start: 2017-09-06 | End: 2017-09-06 | Stop reason: HOSPADM

## 2017-09-06 RX ORDER — POTASSIUM CHLORIDE 20 MEQ/1
40 TABLET, EXTENDED RELEASE ORAL DAILY
Status: DISCONTINUED | OUTPATIENT
Start: 2017-09-07 | End: 2017-09-09 | Stop reason: HOSPADM

## 2017-09-06 RX ORDER — PROMETHAZINE HYDROCHLORIDE 25 MG/1
12.5-25 SUPPOSITORY RECTAL EVERY 4 HOURS PRN
Status: CANCELLED | OUTPATIENT
Start: 2017-09-06

## 2017-09-06 RX ORDER — LORAZEPAM 2 MG/ML
0.5 INJECTION INTRAMUSCULAR EVERY 6 HOURS PRN
Status: CANCELLED | OUTPATIENT
Start: 2017-09-06

## 2017-09-06 RX ORDER — BISACODYL 10 MG
10 SUPPOSITORY, RECTAL RECTAL
Status: DISCONTINUED | OUTPATIENT
Start: 2017-09-06 | End: 2017-09-09 | Stop reason: HOSPADM

## 2017-09-06 RX ORDER — LORAZEPAM 1 MG/1
0.5 TABLET ORAL EVERY 6 HOURS PRN
Status: CANCELLED | OUTPATIENT
Start: 2017-09-06

## 2017-09-06 RX ORDER — BISACODYL 10 MG
10 SUPPOSITORY, RECTAL RECTAL
Status: DISCONTINUED | OUTPATIENT
Start: 2017-09-06 | End: 2017-09-06 | Stop reason: HOSPADM

## 2017-09-06 RX ORDER — LORAZEPAM 1 MG/1
0.5 TABLET ORAL EVERY 6 HOURS PRN
Status: DISCONTINUED | OUTPATIENT
Start: 2017-09-06 | End: 2017-09-09 | Stop reason: HOSPADM

## 2017-09-06 RX ORDER — ONDANSETRON 2 MG/ML
4 INJECTION INTRAMUSCULAR; INTRAVENOUS EVERY 4 HOURS PRN
Status: DISCONTINUED | OUTPATIENT
Start: 2017-09-06 | End: 2017-09-09 | Stop reason: HOSPADM

## 2017-09-06 RX ORDER — ACETAMINOPHEN 325 MG/1
650 TABLET ORAL EVERY 6 HOURS PRN
Status: CANCELLED | OUTPATIENT
Start: 2017-09-06

## 2017-09-06 RX ORDER — FLUOXETINE HYDROCHLORIDE 20 MG/1
80 CAPSULE ORAL DAILY
Status: DISCONTINUED | OUTPATIENT
Start: 2017-09-07 | End: 2017-09-09 | Stop reason: HOSPADM

## 2017-09-06 RX ORDER — ACETAMINOPHEN 325 MG/1
650 TABLET ORAL EVERY 6 HOURS PRN
Status: DISCONTINUED | OUTPATIENT
Start: 2017-09-06 | End: 2017-09-09 | Stop reason: HOSPADM

## 2017-09-06 RX ORDER — LORAZEPAM 2 MG/ML
0.5 INJECTION INTRAMUSCULAR EVERY 6 HOURS PRN
Status: DISCONTINUED | OUTPATIENT
Start: 2017-09-06 | End: 2017-09-06 | Stop reason: HOSPADM

## 2017-09-06 RX ORDER — PROMETHAZINE HYDROCHLORIDE 25 MG/1
12.5-25 TABLET ORAL EVERY 4 HOURS PRN
Status: DISCONTINUED | OUTPATIENT
Start: 2017-09-06 | End: 2017-09-06 | Stop reason: HOSPADM

## 2017-09-06 RX ORDER — POTASSIUM CHLORIDE 20 MEQ/1
40 TABLET, EXTENDED RELEASE ORAL DAILY
Status: CANCELLED | OUTPATIENT
Start: 2017-09-07

## 2017-09-06 RX ORDER — ASPIRIN 81 MG/1
324 TABLET, CHEWABLE ORAL DAILY
Status: CANCELLED | OUTPATIENT
Start: 2017-09-06

## 2017-09-06 RX ORDER — HEPARIN SODIUM 5000 [USP'U]/ML
5000 INJECTION, SOLUTION INTRAVENOUS; SUBCUTANEOUS EVERY 8 HOURS
Status: CANCELLED | OUTPATIENT
Start: 2017-09-06

## 2017-09-06 RX ORDER — PROMETHAZINE HYDROCHLORIDE 25 MG/1
12.5-25 TABLET ORAL EVERY 4 HOURS PRN
Status: CANCELLED | OUTPATIENT
Start: 2017-09-06

## 2017-09-06 RX ORDER — BISACODYL 10 MG
10 SUPPOSITORY, RECTAL RECTAL
Status: CANCELLED | OUTPATIENT
Start: 2017-09-06

## 2017-09-06 RX ORDER — ASPIRIN 325 MG
325 TABLET ORAL DAILY
Status: DISCONTINUED | OUTPATIENT
Start: 2017-09-07 | End: 2017-09-09 | Stop reason: HOSPADM

## 2017-09-06 RX ORDER — POLYETHYLENE GLYCOL 3350 17 G/17G
1 POWDER, FOR SOLUTION ORAL
Status: DISCONTINUED | OUTPATIENT
Start: 2017-09-06 | End: 2017-09-06 | Stop reason: HOSPADM

## 2017-09-06 RX ORDER — ONDANSETRON 2 MG/ML
4 INJECTION INTRAMUSCULAR; INTRAVENOUS EVERY 4 HOURS PRN
Status: CANCELLED | OUTPATIENT
Start: 2017-09-06

## 2017-09-06 RX ADMIN — SODIUM CHLORIDE 1000 ML: 9 INJECTION, SOLUTION INTRAVENOUS at 17:28

## 2017-09-06 RX ADMIN — HEPARIN SODIUM 5000 UNITS: 5000 INJECTION, SOLUTION INTRAVENOUS; SUBCUTANEOUS at 23:49

## 2017-09-06 RX ADMIN — DIBASIC SODIUM PHOSPHATE, MONOBASIC POTASSIUM PHOSPHATE AND MONOBASIC SODIUM PHOSPHATE 1 TABLET: 852; 155; 130 TABLET ORAL at 23:49

## 2017-09-06 ASSESSMENT — LIFESTYLE VARIABLES
DO YOU DRINK ALCOHOL: NO
SUBSTANCE_ABUSE: 0
EVER_SMOKED: NEVER
EVER_SMOKED: NEVER

## 2017-09-06 ASSESSMENT — PAIN SCALES - GENERAL
PAINLEVEL_OUTOF10: 0
PAINLEVEL_OUTOF10: 0

## 2017-09-06 ASSESSMENT — ENCOUNTER SYMPTOMS
NECK PAIN: 0
BRUISES/BLEEDS EASILY: 0
WHEEZING: 0
DIARRHEA: 0
NERVOUS/ANXIOUS: 1
FOCAL WEAKNESS: 0
CLAUDICATION: 0
FEVER: 0
EYE PAIN: 0
CHILLS: 0
SPUTUM PRODUCTION: 0
SHORTNESS OF BREATH: 1
VOMITING: 0
LOSS OF CONSCIOUSNESS: 0
MYALGIAS: 1
SORE THROAT: 0
DIAPHORESIS: 0
DEPRESSION: 0
HEMOPTYSIS: 0
EYE DISCHARGE: 0
BACK PAIN: 0
PALPITATIONS: 0
HEADACHES: 0
DIZZINESS: 0
CONSTIPATION: 0
ABDOMINAL PAIN: 0
NAUSEA: 0
COUGH: 0
SPEECH CHANGE: 0
SENSORY CHANGE: 0
WEAKNESS: 0

## 2017-09-06 ASSESSMENT — COGNITIVE AND FUNCTIONAL STATUS - GENERAL
SUGGESTED CMS G CODE MODIFIER MOBILITY: CH
DAILY ACTIVITIY SCORE: 24
SUGGESTED CMS G CODE MODIFIER DAILY ACTIVITY: CH
MOBILITY SCORE: 24

## 2017-09-06 ASSESSMENT — COPD QUESTIONNAIRES
DO YOU EVER COUGH UP ANY MUCUS OR PHLEGM?: NO/ONLY WITH OCCASIONAL COLDS OR INFECTIONS
HAVE YOU SMOKED AT LEAST 100 CIGARETTES IN YOUR ENTIRE LIFE: NO/DON'T KNOW
DO YOU EVER COUGH UP ANY MUCUS OR PHLEGM?: NO/ONLY WITH OCCASIONAL COLDS OR INFECTIONS
DURING THE PAST 4 WEEKS HOW MUCH DID YOU FEEL SHORT OF BREATH: SOME OF THE TIME
COPD SCREENING SCORE: 0
DURING THE PAST 4 WEEKS HOW MUCH DID YOU FEEL SHORT OF BREATH: NONE/LITTLE OF THE TIME
HAVE YOU SMOKED AT LEAST 100 CIGARETTES IN YOUR ENTIRE LIFE: NO/DON'T KNOW
COPD SCREENING SCORE: 1

## 2017-09-06 ASSESSMENT — PATIENT HEALTH QUESTIONNAIRE - PHQ9
2. FEELING DOWN, DEPRESSED, IRRITABLE, OR HOPELESS: NOT AT ALL
SUM OF ALL RESPONSES TO PHQ9 QUESTIONS 1 AND 2: 0
1. LITTLE INTEREST OR PLEASURE IN DOING THINGS: NOT AT ALL
SUM OF ALL RESPONSES TO PHQ QUESTIONS 1-9: 0

## 2017-09-06 NOTE — ED NOTES
Med Rec completed per patient  Allergies reviewed  No ORAL antibiotics in last 30 days      Patient had a cold over weekend and took DayQuil, NyQuil and Tylenol cold

## 2017-09-07 ENCOUNTER — APPOINTMENT (OUTPATIENT)
Dept: RADIOLOGY | Facility: MEDICAL CENTER | Age: 37
DRG: 204 | End: 2017-09-07
Attending: STUDENT IN AN ORGANIZED HEALTH CARE EDUCATION/TRAINING PROGRAM
Payer: COMMERCIAL

## 2017-09-07 LAB
AMPHET UR QL SCN: NEGATIVE
ANION GAP SERPL CALC-SCNC: 10 MMOL/L (ref 0–11.9)
BARBITURATES UR QL SCN: NEGATIVE
BASOPHILS # BLD AUTO: 0.4 % (ref 0–1.8)
BASOPHILS # BLD: 0.05 K/UL (ref 0–0.12)
BENZODIAZ UR QL SCN: NEGATIVE
BUN SERPL-MCNC: 12 MG/DL (ref 8–22)
BZE UR QL SCN: NEGATIVE
CALCIUM SERPL-MCNC: 8.5 MG/DL (ref 8.5–10.5)
CANNABINOIDS UR QL SCN: NEGATIVE
CHLORIDE SERPL-SCNC: 108 MMOL/L (ref 96–112)
CHOLEST SERPL-MCNC: 180 MG/DL (ref 100–199)
CO2 SERPL-SCNC: 22 MMOL/L (ref 20–33)
CREAT SERPL-MCNC: 0.77 MG/DL (ref 0.5–1.4)
EKG IMPRESSION: NORMAL
EKG IMPRESSION: NORMAL
EOSINOPHIL # BLD AUTO: 0.39 K/UL (ref 0–0.51)
EOSINOPHIL NFR BLD: 3.5 % (ref 0–6.9)
ERYTHROCYTE [DISTWIDTH] IN BLOOD BY AUTOMATED COUNT: 45.1 FL (ref 35.9–50)
GFR SERPL CREATININE-BSD FRML MDRD: >60 ML/MIN/1.73 M 2
GLUCOSE SERPL-MCNC: 91 MG/DL (ref 65–99)
HCT VFR BLD AUTO: 36.1 % (ref 37–47)
HDLC SERPL-MCNC: 39 MG/DL
HGB BLD-MCNC: 12.1 G/DL (ref 12–16)
IMM GRANULOCYTES # BLD AUTO: 0.03 K/UL (ref 0–0.11)
IMM GRANULOCYTES NFR BLD AUTO: 0.3 % (ref 0–0.9)
LDLC SERPL CALC-MCNC: 111 MG/DL
LV EJECT FRACT  99904: 60
LV EJECT FRACT MOD 2C 99903: 63.72
LV EJECT FRACT MOD 4C 99902: 55.51
LV EJECT FRACT MOD BP 99901: 60.35
LYMPHOCYTES # BLD AUTO: 3.23 K/UL (ref 1–4.8)
LYMPHOCYTES NFR BLD: 28.9 % (ref 22–41)
MCH RBC QN AUTO: 29.8 PG (ref 27–33)
MCHC RBC AUTO-ENTMCNC: 33.5 G/DL (ref 33.6–35)
MCV RBC AUTO: 88.9 FL (ref 81.4–97.8)
METHADONE UR QL SCN: NEGATIVE
MONOCYTES # BLD AUTO: 0.59 K/UL (ref 0–0.85)
MONOCYTES NFR BLD AUTO: 5.3 % (ref 0–13.4)
NEUTROPHILS # BLD AUTO: 6.88 K/UL (ref 2–7.15)
NEUTROPHILS NFR BLD: 61.6 % (ref 44–72)
NRBC # BLD AUTO: 0 K/UL
NRBC BLD AUTO-RTO: 0 /100 WBC
OPIATES UR QL SCN: NEGATIVE
OXYCODONE UR QL SCN: NEGATIVE
PCP UR QL SCN: NEGATIVE
PLATELET # BLD AUTO: 270 K/UL (ref 164–446)
PMV BLD AUTO: 10.2 FL (ref 9–12.9)
POTASSIUM SERPL-SCNC: 3.5 MMOL/L (ref 3.6–5.5)
PROPOXYPH UR QL SCN: NEGATIVE
RBC # BLD AUTO: 4.06 M/UL (ref 4.2–5.4)
SODIUM SERPL-SCNC: 140 MMOL/L (ref 135–145)
TRIGL SERPL-MCNC: 151 MG/DL (ref 0–149)
TROPONIN I SERPL-MCNC: 0.09 NG/ML (ref 0–0.04)
WBC # BLD AUTO: 11.2 K/UL (ref 4.8–10.8)

## 2017-09-07 PROCEDURE — 81241 F5 GENE: CPT

## 2017-09-07 PROCEDURE — 71275 CT ANGIOGRAPHY CHEST: CPT

## 2017-09-07 PROCEDURE — 80048 BASIC METABOLIC PNL TOTAL CA: CPT

## 2017-09-07 PROCEDURE — 36415 COLL VENOUS BLD VENIPUNCTURE: CPT

## 2017-09-07 PROCEDURE — 700102 HCHG RX REV CODE 250 W/ 637 OVERRIDE(OP): Performed by: HOSPITALIST

## 2017-09-07 PROCEDURE — 700111 HCHG RX REV CODE 636 W/ 250 OVERRIDE (IP): Performed by: HOSPITALIST

## 2017-09-07 PROCEDURE — 80307 DRUG TEST PRSMV CHEM ANLYZR: CPT

## 2017-09-07 PROCEDURE — 93306 TTE W/DOPPLER COMPLETE: CPT

## 2017-09-07 PROCEDURE — 85025 COMPLETE CBC W/AUTO DIFF WBC: CPT

## 2017-09-07 PROCEDURE — 700111 HCHG RX REV CODE 636 W/ 250 OVERRIDE (IP)

## 2017-09-07 PROCEDURE — 85306 CLOT INHIBIT PROT S FREE: CPT

## 2017-09-07 PROCEDURE — A9270 NON-COVERED ITEM OR SERVICE: HCPCS | Performed by: HOSPITALIST

## 2017-09-07 PROCEDURE — 770020 HCHG ROOM/CARE - TELE (206)

## 2017-09-07 PROCEDURE — 80061 LIPID PANEL: CPT

## 2017-09-07 PROCEDURE — 84484 ASSAY OF TROPONIN QUANT: CPT

## 2017-09-07 PROCEDURE — 99232 SBSQ HOSP IP/OBS MODERATE 35: CPT | Performed by: HOSPITALIST

## 2017-09-07 PROCEDURE — 700117 HCHG RX CONTRAST REV CODE 255: Performed by: HOSPITALIST

## 2017-09-07 PROCEDURE — 85303 CLOT INHIBIT PROT C ACTIVITY: CPT

## 2017-09-07 RX ORDER — HEPARIN SODIUM 1000 [USP'U]/ML
3200 INJECTION, SOLUTION INTRAVENOUS; SUBCUTANEOUS PRN
Status: DISCONTINUED | OUTPATIENT
Start: 2017-09-07 | End: 2017-09-07

## 2017-09-07 RX ORDER — HEPARIN SODIUM 1000 [USP'U]/ML
6000 INJECTION, SOLUTION INTRAVENOUS; SUBCUTANEOUS ONCE
Status: COMPLETED | OUTPATIENT
Start: 2017-09-07 | End: 2017-09-07

## 2017-09-07 RX ADMIN — DIBASIC SODIUM PHOSPHATE, MONOBASIC POTASSIUM PHOSPHATE AND MONOBASIC SODIUM PHOSPHATE 1 TABLET: 852; 155; 130 TABLET ORAL at 18:09

## 2017-09-07 RX ADMIN — HEPARIN SODIUM 6000 UNITS: 1000 INJECTION, SOLUTION INTRAVENOUS; SUBCUTANEOUS at 12:20

## 2017-09-07 RX ADMIN — DIBASIC SODIUM PHOSPHATE, MONOBASIC POTASSIUM PHOSPHATE AND MONOBASIC SODIUM PHOSPHATE 1 TABLET: 852; 155; 130 TABLET ORAL at 12:20

## 2017-09-07 RX ADMIN — FLUOXETINE HYDROCHLORIDE 80 MG: 20 CAPSULE ORAL at 08:21

## 2017-09-07 RX ADMIN — HEPARIN SODIUM 1200 UNITS/HR: 5000 INJECTION, SOLUTION INTRAVENOUS at 12:20

## 2017-09-07 RX ADMIN — DIBASIC SODIUM PHOSPHATE, MONOBASIC POTASSIUM PHOSPHATE AND MONOBASIC SODIUM PHOSPHATE 1 TABLET: 852; 155; 130 TABLET ORAL at 06:34

## 2017-09-07 RX ADMIN — IOHEXOL 55 ML: 350 INJECTION, SOLUTION INTRAVENOUS at 11:46

## 2017-09-07 RX ADMIN — DIBASIC SODIUM PHOSPHATE, MONOBASIC POTASSIUM PHOSPHATE AND MONOBASIC SODIUM PHOSPHATE 1 TABLET: 852; 155; 130 TABLET ORAL at 23:54

## 2017-09-07 RX ADMIN — HEPARIN SODIUM 5000 UNITS: 5000 INJECTION, SOLUTION INTRAVENOUS; SUBCUTANEOUS at 06:33

## 2017-09-07 RX ADMIN — POTASSIUM CHLORIDE 40 MEQ: 1500 TABLET, EXTENDED RELEASE ORAL at 08:21

## 2017-09-07 RX ADMIN — ASPIRIN 325 MG: 325 TABLET, COATED ORAL at 08:21

## 2017-09-07 ASSESSMENT — ENCOUNTER SYMPTOMS
DEPRESSION: 0
EYE PAIN: 0
FEVER: 0
TREMORS: 0
ORTHOPNEA: 0
SHORTNESS OF BREATH: 1
BLURRED VISION: 0
HEADACHES: 0
HEARTBURN: 0
TINGLING: 0
COUGH: 0
PND: 0
DOUBLE VISION: 0
STRIDOR: 0
CHILLS: 0
SPUTUM PRODUCTION: 0
WEAKNESS: 1
DIZZINESS: 0
MYALGIAS: 0
NECK PAIN: 0
MEMORY LOSS: 0
NAUSEA: 0
NERVOUS/ANXIOUS: 0
SENSORY CHANGE: 0
WHEEZING: 0
BACK PAIN: 0
PALPITATIONS: 0
HEMOPTYSIS: 0
VOMITING: 0
SPEECH CHANGE: 0
BLOOD IN STOOL: 0
PHOTOPHOBIA: 0
SORE THROAT: 0
CONSTIPATION: 0
CLAUDICATION: 0

## 2017-09-07 ASSESSMENT — LIFESTYLE VARIABLES: EVER_SMOKED: NEVER

## 2017-09-07 ASSESSMENT — PAIN SCALES - GENERAL
PAINLEVEL_OUTOF10: 0
PAINLEVEL_OUTOF10: 0

## 2017-09-07 NOTE — PROGRESS NOTES
"Med rec complete per med rec tech at [facility]:    \"Med Rec completed per patient  Allergies reviewed  No ORAL antibiotics in last 30 days        Patient had a cold over weekend and took DayQuil, NyQuil and Tylenol cold  \"  "

## 2017-09-07 NOTE — PROGRESS NOTES
Cardiology Progress Note               Author: Troy Lockett Date & Time created: 2017  10:55 AM     Interval History:  Seen for eval of dyspnea.  Patient notes sudden onset on Monday night.  Family Hx of PE (father)  Echo shows normal LV function and elevated  pulm art pressures    Review of Systems   Constitutional: Negative for fever.   Respiratory: Positive for shortness of breath. Negative for hemoptysis and sputum production.    Cardiovascular: Negative for chest pain.       Physical Exam   Constitutional: No distress.   HENT:   Head: Normocephalic and atraumatic.   Eyes: No scleral icterus.   Cardiovascular: Normal rate and regular rhythm.    No murmur heard.  Pulmonary/Chest: Effort normal and breath sounds normal. No respiratory distress. She has no wheezes.   Neurological: She is alert.   Skin: Skin is warm and dry.       Hemodynamics:  Temp (24hrs), Av.8 °C (98.3 °F), Min:36.7 °C (98 °F), Max:36.9 °C (98.5 °F)  Temperature: 36.7 °C (98 °F)  Pulse  Av.4  Min: 59  Max: 72   Blood Pressure: 136/71     Respiratory:    Respiration: 20, Pulse Oximetry: 93 %, O2 Daily Delivery Respiratory : Silicone Nasal Cannula     Work Of Breathing / Effort: Mild  RUL Breath Sounds: Clear, RML Breath Sounds: Clear, RLL Breath Sounds: Clear, HEIDI Breath Sounds: Clear, LLL Breath Sounds: Clear  Fluids:     Weight: (!) 125.2 kg (276 lb 0.3 oz)  GI/Nutrition:  Orders Placed This Encounter   Procedures   • Diet Order     Standing Status:   Standing     Number of Occurrences:   1     Order Specific Question:   Diet:     Answer:   Cardiac [6]     Lab Results:  Recent Labs      17   17217   0003   WBC  11.7*  11.2*   RBC  4.51  4.06*   HEMOGLOBIN  13.2  12.1   HEMATOCRIT  39.6  36.1*   MCV  87.8  88.9   MCH  29.3  29.8   MCHC  33.3*  33.5*   RDW  44.0  45.1   PLATELETCT  305  270   MPV  10.0  10.2     Recent Labs      17   1727  17   0003   SODIUM  139  140   POTASSIUM  3.3*  3.5*   CHLORIDE   106  108   CO2  24  22   GLUCOSE  90  91   BUN  12  12   CREATININE  0.95  0.77   CALCIUM  8.3*  8.5     Recent Labs      09/06/17   1727   APTT  21.7*   INR  0.94     Recent Labs      09/06/17   1727   BNPBTYPENAT  243*     Recent Labs      09/06/17   1727  09/07/17   0003   TROPONINI  0.13*  0.09*   BNPBTYPENAT  243*   --      Recent Labs      09/07/17   0003   TRIGLYCERIDE  151*   HDL  39*   LDL  111*         Medical Decision Making, by Problem:  There are no active hospital problems to display for this patient.      Plan:  History is highly suspicious for pulmonary embolism.  Stat CT scan ordered.  Recommend starting heparin as index of suspicion for PE is very high.  Also order Leiden factor 5  Protein c/S because of family history    Quality-Core Measures

## 2017-09-07 NOTE — CONSULTS
Cardiology Consult Note:    Balbir Lassiter  Date of Service:    9/7/2017   1:25 AM     Referring MD:  Dr. Cali/ER    Patient ID:   Name:             Modesta Archuleta     YOB: 1980  Age:                 37 y.o.  female   MRN:               [id number]                                                             Chief Complaint:      CASTELLANO    History of Present Illness:    38 y/o female present with generalized body ache x 4 d with chill and cough, Severe CASTELLANO, congestion and found to have tachycardia and hypoxemia as low as 70's on pulse ox at home; Unable to walk 20 ft w/o being dyspneic; No complication with prior pregnancy; Thyroid related tachycardia when teen, given BB to control her heart rate until thyroidectomy.     Review of Systems:   See above;    Constitutional: Denies fevers, Sig gain  weight changes after thyroidectomy  Eyes: Denies changes in vision, no eye pain  Ears/Nose/Throat/Mouth: Denies nasal congestion or sore throat   Cardiovascular: - chest pain, - palpitations   Respiratory: CASTELLANO,  shortness of breath , Denies cough  Gastrointestinal/Hepatic: Denies abdominal pain, nausea, vomiting, diarrhea, constipation or GI bleeding   Genitourinary: Denies dysuria or frequency  Musculoskeletal/Rheum: Denies  joint pain and swelling   Skin: Denies rash  Neurological: Denies headache, confusion, memory loss or focal weakness/parasthesias  Psychiatric: denies mood disorder   Endocrine: Candice thyroid problems  Heme/Oncology/Lymph Nodes: Denies enlarged lymph nodes, denies brusing or known bleeding disorder  All other systems were reviewed and are negative (AMA/CMS criteria)                Past Medical History:   Past Medical History:   Diagnosis Date   • Ocular migraine 2/12/2015   • Anxiety and depression 2/12/2015   • Chronic diarrhea 2/12/2015     There are no active hospital problems to display for this patient.      Past Surgical History:  Past Surgical History:   Procedure Laterality Date   •  PRIMARY C SECTION  11/13/2016    Procedure: PRIMARY C SECTION;  Surgeon: Delphine Carbone M.D.;  Location: LABOR AND DELIVERY;  Service:    • ARTHROSCOPY, KNEE     • TONSILLECTOMY         Hospital Medications:    Current Facility-Administered Medications:   •  heparin injection 5,000 Units, 5,000 Units, Subcutaneous, Q8HRS, Ely Sanders M.D., 5,000 Units at 09/06/17 7054  •  lorazepam (ATIVAN) tablet 0.5 mg, 0.5 mg, Oral, Q6HRS PRN **OR** lorazepam (ATIVAN) injection 0.5 mg, 0.5 mg, Intravenous, Q6HRS PRN, Ely Sanders M.D.  •  ondansetron (ZOFRAN ODT) dispertab 4 mg, 4 mg, Oral, Q4HRS PRN, Ely Sanders M.D.  •  ondansetron (ZOFRAN) syringe/vial injection 4 mg, 4 mg, Intravenous, Q4HRS PRN, Ely Sanders M.D.  •  prochlorperazine (COMPAZINE) injection 5-10 mg, 5-10 mg, Intravenous, Q4HRS PRN, Ely Sanders M.D.  •  promethazine (PHENERGAN) suppository 12.5-25 mg, 12.5-25 mg, Rectal, Q4HRS PRN, Ely Sanders M.D.  •  promethazine (PHENERGAN) tablet 12.5-25 mg, 12.5-25 mg, Oral, Q4HRS PRN, Ely Sanders M.D.  •  senna-docusate (PERICOLACE or SENOKOT S) 8.6-50 MG per tablet 2 Tab, 2 Tab, Oral, BID **AND** polyethylene glycol/lytes (MIRALAX) PACKET 1 Packet, 1 Packet, Oral, QDAY PRN **AND** magnesium hydroxide (MILK OF MAGNESIA) suspension 30 mL, 30 mL, Oral, QDAY PRN **AND** bisacodyl (DULCOLAX) suppository 10 mg, 10 mg, Rectal, QDAY PRN, Ely M Marilyn, M.D.  •  acetaminophen (TYLENOL) tablet 650 mg, 650 mg, Oral, Q6HRS PRN, Ely Sanders M.D.  •  Respiratory Care per Protocol, , Nebulization, Continuous RT, Ely Sanders M.D.  •  aspirin (ASA) tablet 325 mg, 325 mg, Oral, DAILY **OR** aspirin (ASA) chewable tab 324 mg, 324 mg, Oral, DAILY **OR** aspirin (ASA) suppository 300 mg, 300 mg, Rectal, DAILY, Ely Sanders M.D.  •  fluoxetine (PROZAC) capsule 80 mg, 80 mg, Oral, DAILY, Eyl Sanders M.D.  •  phosphorus (K-PHOS-NEUTRAL, PHOSPHA 250 NEUTRAL) per tablet 1 Tab, 1 Tab, Oral, Q6HRS, Ely POLLARD  "KELLY Sanders, 1 Tab at 09/06/17 2349  •  potassium chloride SA (Kdur) tablet 40 mEq, 40 mEq, Oral, DAILY, Ely Sanders M.D.    Current Outpatient Medications:  Prescriptions Prior to Admission   Medication Sig Dispense Refill Last Dose   • fluoxetine (PROZAC) 40 MG capsule Take 80 mg by mouth every day.   9/6/2017 at AM   • alprazolam (XANAX) 0.5 MG Tab Take 0.5 mg by mouth 1 time daily as needed for Anxiety.   9/5/2017 at 1200       Medication Allergy:  Allergies   Allergen Reactions   • Erythromycin      Pt reports \" crying for no reason, and my jaw locked years ago\"    • Pcn [Penicillins] Hives       Family History:    Family History   Problem Relation Age of Onset   • Hypertension Father    • Heart Disease Father    • DVT Father        Social History:  Social History     Social History   • Marital status: Single     Spouse name: N/A   • Number of children: N/A   • Years of education: N/A     Occupational History   • Not on file.     Social History Main Topics   • Smoking status: Never Smoker   • Smokeless tobacco: Never Used   • Alcohol use Yes      Comment: socially    • Drug use: No   • Sexual activity: Not on file      Comment:      Other Topics Concern   • Not on file     Social History Narrative   • No narrative on file         Physical Exam:  Vitals  Weight/BMI: Body mass index is 43.23 kg/m².  Blood pressure 129/61, pulse 60, temperature 36.9 °C (98.5 °F), resp. rate 17, height 1.702 m (5' 7\"), weight (!) 125.2 kg (276 lb 0.3 oz), SpO2 97 %.  Vitals:    09/06/17 2144 09/06/17 2339 09/06/17 2345   BP: 134/73  129/61   Pulse: (!) 59 63 60   Resp: 17 16 17   Temp: 36.8 °C (98.2 °F)  36.9 °C (98.5 °F)   SpO2: 98%  97%   Weight: (!) 125.2 kg (276 lb 0.3 oz)     Height: 1.702 m (5' 7\")       Oxygen Therapy:  Pulse Oximetry: 97 %, O2 (LPM): 2, O2 Delivery: Nasal Cannula  General Appearance:  Obese;  Well developed, Well nourished, No acute distress, Non-toxic appearance.   HENT:  Normocephalic, " Atraumatic, Oropharynx moist mucous membranes, Dentition: , Nose normal.    Eyes:  PERRLA, EOMI, Conjunctiva normal, No discharge.  Neck:  Normal range of motion, No cervical tenderness, Supple, No stridor, no JVD .  No thyromegaly.  No carotid bruit.  Cardiovascular:  Normal heart rate, Normal rhythm, distant S1, S2, no S3,  S4; No gallops; No murmurs, No rubs, .   Extremitites with intact distal pulses, no cyanosis, clubbing or edema.  No heaves, thrills, HJR;  Peripheral pulses: carotid 2+, brachial 2+, radial 2+, ulnar 2+, femoral 2+, popliteal 2+, PT 2+, DP 2+;  Lungs:  Respiratory effort is normal. Normal breath sounds, breath sounds clear to auscultation bilaterally,  no rales, +rhonchi, no wheezing.   Abdomen: Large; Bowel sounds normal, Soft, No tenderness, No guarding, No rebound, No masses, No hepatosplenomegaly.  Skin: Sweaty; Warm, Dry, No erythema, No rash, no induration or crepitus.  Neurologic: Alert & oriented x 3, Normal motor function, Normal sensory function, No focal deficits noted, cranial nerves II through XII are normal,  normal gait.  Psychiatric: Affect normal, Judgment normal, Mood anxious      MDM (Data Review):     Records reviewed and summarized in current documentation    Lab Data Review:  Recent Results (from the past 24 hour(s))   EKG (NOW)    Collection Time: 17  4:28 PM   Result Value Ref Range    Report       [facility]    Test Date:  2017  Pt Name:    TWIN ABDUL                Department: [facility]  MRN:        [id number]                      Room:  Gender:     F                            Technician: NATALIE  :        1980                   Requested By:ER TRIAGE PROTOCOL  Order #:    [id number]                    Reading MD:    Measurements  Intervals                                Axis  Rate:       72                           P:          55  MA:         127                          QRS:        38  QRSD:       86                            T:          38  QT:         424  QTc:        465    Interpretive Statements  Sinus rhythm  No previous ECG available for comparison     CBC with Differential    Collection Time: 09/06/17  5:27 PM   Result Value Ref Range    WBC 11.7 (H) 4.8 - 10.8 K/uL    RBC 4.51 4.20 - 5.40 M/uL    Hemoglobin 13.2 12.0 - 16.0 g/dL    Hematocrit 39.6 37.0 - 47.0 %    MCV 87.8 81.4 - 97.8 fL    MCH 29.3 27.0 - 33.0 pg    MCHC 33.3 (L) 33.6 - 35.0 g/dL    RDW 44.0 35.9 - 50.0 fL    Platelet Count 305 164 - 446 K/uL    MPV 10.0 9.0 - 12.9 fL    Neutrophils-Polys 67.40 44.00 - 72.00 %    Lymphocytes 22.20 22.00 - 41.00 %    Monocytes 6.20 0.00 - 13.40 %    Eosinophils 3.40 0.00 - 6.90 %    Basophils 0.50 0.00 - 1.80 %    Immature Granulocytes 0.30 0.00 - 0.90 %    Nucleated RBC 0.00 /100 WBC    Neutrophils (Absolute) 7.87 (H) 2.00 - 7.15 K/uL    Lymphs (Absolute) 2.59 1.00 - 4.80 K/uL    Monos (Absolute) 0.73 0.00 - 0.85 K/uL    Eos (Absolute) 0.40 0.00 - 0.51 K/uL    Baso (Absolute) 0.06 0.00 - 0.12 K/uL    Immature Granulocytes (abs) 0.04 0.00 - 0.11 K/uL    NRBC (Absolute) 0.00 K/uL   Complete Metabolic Panel (CMP)    Collection Time: 09/06/17  5:27 PM   Result Value Ref Range    Sodium 139 135 - 145 mmol/L    Potassium 3.3 (L) 3.6 - 5.5 mmol/L    Chloride 106 96 - 112 mmol/L    Co2 24 20 - 33 mmol/L    Anion Gap 9.0 0.0 - 11.9    Glucose 90 65 - 99 mg/dL    Bun 12 8 - 22 mg/dL    Creatinine 0.95 0.50 - 1.40 mg/dL    Calcium 8.3 (L) 8.4 - 10.2 mg/dL    AST(SGOT) 16 12 - 45 U/L    ALT(SGPT) 21 2 - 50 U/L    Alkaline Phosphatase 85 30 - 99 U/L    Total Bilirubin 0.5 0.1 - 1.5 mg/dL    Albumin 3.6 3.2 - 4.9 g/dL    Total Protein 7.0 6.0 - 8.2 g/dL    Globulin 3.4 1.9 - 3.5 g/dL    A-G Ratio 1.1 g/dL   Btype Natriuretic Peptide (BNP)    Collection Time: 09/06/17  5:27 PM   Result Value Ref Range    B Natriuretic Peptide 243 (H) 0 - 100 pg/mL   Prothrombin Time (PT/INR)    Collection Time: 09/06/17  5:27 PM   Result Value Ref  Range    PT 12.4 12.0 - 14.6 sec    INR 0.94 0.87 - 1.13   APTT    Collection Time: 17  5:27 PM   Result Value Ref Range    APTT 21.7 (L) 24.7 - 36.0 sec   Lipase    Collection Time: 17  5:27 PM   Result Value Ref Range    Lipase 18 7 - 58 U/L   Troponin STAT    Collection Time: 17  5:27 PM   Result Value Ref Range    Troponin I 0.13 (H) 0.00 - 0.04 ng/mL   Phosphorus    Collection Time: 17  5:27 PM   Result Value Ref Range    Phosphorus 2.4 (L) 2.5 - 4.5 mg/dL   Magnesium    Collection Time: 17  5:27 PM   Result Value Ref Range    Magnesium 1.9 1.5 - 2.5 mg/dL   D-Dimer    Collection Time: 17  5:27 PM   Result Value Ref Range    D-Dimer Screen <200 <250 ng/mL(D-DU)   TSH (for screening thyroid dysfunction)    Collection Time: 17  5:27 PM   Result Value Ref Range    TSH 2.500 0.350 - 5.500 uIU/mL   Free Thyroxine (add to TSH for patients with existing thyroid dysfunction)    Collection Time: 17  5:27 PM   Result Value Ref Range    Free T-4 0.73 0.58 - 1.64 ng/dL   HCG QUAL SERUM    Collection Time: 17  5:27 PM   Result Value Ref Range    Beta-Hcg Qualitative Serum Negative Negative   ESTIMATED GFR    Collection Time: 17  5:27 PM   Result Value Ref Range    GFR If African American >60 >60 mL/min/1.73 m 2    GFR If Non African American >60 >60 mL/min/1.73 m 2   EKG (ER)    Collection Time: 17 11:42 PM   Result Value Ref Range    Report       [facility]    Test Date:  2017  Pt Name:    TWIN ABDUL                Department: [facility]  MRN:        [id number]                      [facility]  Gender:     F                            Technician: VANNESSA  :        1980                   Requested By:ESTEFANY DAVENPORT  Order #:    [id number]                    Reading MD:    Measurements  Intervals                                Axis  Rate:       61                           P:          14  NJ:         128                          QRS:         40  QRSD:       90                           T:          53  QT:         456  QTc:        460    Interpretive Statements  SINUS RHYTHM  BORDERLINE LOW VOLTAGE IN FRONTAL LEADS  Compared to ECG 09/06/2017 16:28:56  No significant changes     ECHOCARDIOGRAM COMP W/O CONT    Collection Time: 09/06/17 11:50 PM   Result Value Ref Range    Eject.Frac. MOD BP 60.35     Eject.Frac. MOD 4C 55.51     Eject.Frac. MOD 2C 63.72     Left Ventrical Ejection Fraction 60    Basic Metabolic Panel (BMP)    Collection Time: 09/07/17 12:03 AM   Result Value Ref Range    Sodium 140 135 - 145 mmol/L    Potassium 3.5 (L) 3.6 - 5.5 mmol/L    Chloride 108 96 - 112 mmol/L    Co2 22 20 - 33 mmol/L    Glucose 91 65 - 99 mg/dL    Bun 12 8 - 22 mg/dL    Creatinine 0.77 0.50 - 1.40 mg/dL    Calcium 8.5 8.5 - 10.5 mg/dL    Anion Gap 10.0 0.0 - 11.9   CBC with Differential    Collection Time: 09/07/17 12:03 AM   Result Value Ref Range    WBC 11.2 (H) 4.8 - 10.8 K/uL    RBC 4.06 (L) 4.20 - 5.40 M/uL    Hemoglobin 12.1 12.0 - 16.0 g/dL    Hematocrit 36.1 (L) 37.0 - 47.0 %    MCV 88.9 81.4 - 97.8 fL    MCH 29.8 27.0 - 33.0 pg    MCHC 33.5 (L) 33.6 - 35.0 g/dL    RDW 45.1 35.9 - 50.0 fL    Platelet Count 270 164 - 446 K/uL    MPV 10.2 9.0 - 12.9 fL    Neutrophils-Polys 61.60 44.00 - 72.00 %    Lymphocytes 28.90 22.00 - 41.00 %    Monocytes 5.30 0.00 - 13.40 %    Eosinophils 3.50 0.00 - 6.90 %    Basophils 0.40 0.00 - 1.80 %    Immature Granulocytes 0.30 0.00 - 0.90 %    Nucleated RBC 0.00 /100 WBC    Neutrophils (Absolute) 6.88 2.00 - 7.15 K/uL    Lymphs (Absolute) 3.23 1.00 - 4.80 K/uL    Monos (Absolute) 0.59 0.00 - 0.85 K/uL    Eos (Absolute) 0.39 0.00 - 0.51 K/uL    Baso (Absolute) 0.05 0.00 - 0.12 K/uL    Immature Granulocytes (abs) 0.03 0.00 - 0.11 K/uL    NRBC (Absolute) 0.00 K/uL   Lipid Profile (Lipid Panel) Fasting    Collection Time: 09/07/17 12:03 AM   Result Value Ref Range    Cholesterol,Tot 180 100 - 199 mg/dL    Triglycerides 151 (H) 0 -  149 mg/dL    HDL 39 (A) >=40 mg/dL     (H) <100 mg/dL   TROPONIN    Collection Time: 09/07/17 12:03 AM   Result Value Ref Range    Troponin I 0.09 (H) 0.00 - 0.04 ng/mL   ESTIMATED GFR    Collection Time: 09/07/17 12:03 AM   Result Value Ref Range    GFR If African American >60 >60 mL/min/1.73 m 2    GFR If Non African American >60 >60 mL/min/1.73 m 2       Imaging/Procedures Review:    Chest Xray:No acute cardiac or pulmonary abnormalities are identified    9/6/2017 Echo: No prior study is available for comparison.   Normal left ventricular size and systolic function.  Mild concentric left ventricular hypertrophy.  Left ventricular ejection fraction is visually estimated to be 60%.  Trace mitral regurgitation.  Mild tricuspid regurgitation.  Estimated right ventricular systolic pressure  is 50 mmHg.  Normal pericardium without effusion.  EKG:   As in HPI. SINUS RHYTHM   BORDERLINE LOW VOLTAGE IN FRONTAL LEADS   Compared to ECG 09/06/2017 16:28:56   No significant changes     MDM (Assessment and Plan):     CASTELLANO  PNA  Obesity  Elevated RVSP/Pulm HTN    Rec: Possible PNA and sleep apnea; Severe CASTELLANO and RVSP elevation noted; Recheck RVSP when better; RHC to verify id symptomatic pulmHTN  Will follow  Thx

## 2017-09-07 NOTE — PROGRESS NOTES
Direct admit from [facility], Dr. Sanders sending, Dr. Sanders receiving. Discharge and readmit orders signed and held, need to be released upon pt arrival. Pt coming by ground.

## 2017-09-07 NOTE — PROGRESS NOTES
Patient arrived from [facility] ([facility]) at approximately 21:00, 9/6. A&O x4. VSS. SR on monitor. Serial q4h troponin & EKG implemented per order. Bedside echo complete. Patient denies pain; denies N/V. Tolerating diet. Good UOP. +BM. Resting comfortably. Continue to monitor.

## 2017-09-07 NOTE — H&P
" Hospital Medicine History and Physical    Date of Service  2017    Chief Complaint  Chief Complaint   Patient presents with   • Tachycardia     Pt reports feeling tachycardic, SOB when ambulating since Monday , states \" I feel fine when I'm sitting.\"   • Shortness of Breath     with ambulating , since Monday    • Generalized Body Aches     On Monday the , has since resolved.    • Abdominal Pain     Cramping diffuse abdominal and nausea pain on Monday. Deneis emesis   • Diarrhea     Since , ongoing.    • Congestion     Pt denies cough, reports \" just a lot of phlegm.\" 95% o2 on RA.       History of Presenting Illness  37 y.o. female who presented 2017 with CASTELLANO with walking 15-20 feet to her car.  She did have a viral syndrome over the weekend and took Dayquil, Nyquil and tylenol cold, had loose diarrhea x 2 days, no melena, that stopped today.  She describes severe body aches on  until 17 resolved today.  She has no hypoxia or dyspnea at rest.  She denies any prior cardiac history, she is under no current stress.  She works as a  at the [employer] for [employer].  She does have a 9-month-old child at home and boyfriend lives with her. She had no reported complications during her pregnancy other than  with subsequent localized bleeding the incision site. She reports no history of DVT or PE in the past. She reports no history of rheumatic fever as a child.    Primary Care Physician  Quynh Joel M.D.    Consultants  Dr. Lassiter, cardiology recommended stat echo.    Code Status  Full code    Review of Systems  Review of Systems   Constitutional: Positive for malaise/fatigue. Negative for chills, diaphoresis and fever.   HENT: Negative for congestion and sore throat.    Eyes: Negative for pain and discharge.   Respiratory: Positive for shortness of breath. Negative for cough, hemoptysis, sputum production and wheezing.    Cardiovascular: Negative for chest pain, " "palpitations, claudication and leg swelling.   Gastrointestinal: Negative for abdominal pain, constipation, diarrhea, melena, nausea and vomiting.   Genitourinary: Negative for dysuria, frequency and urgency.   Musculoskeletal: Positive for myalgias. Negative for back pain, joint pain and neck pain.   Skin: Negative for itching and rash.   Neurological: Negative for dizziness, sensory change, speech change, focal weakness, loss of consciousness, weakness and headaches.   Endo/Heme/Allergies: Does not bruise/bleed easily.   Psychiatric/Behavioral: Negative for depression, substance abuse and suicidal ideas. The patient is nervous/anxious.         Past Medical History  Past Medical History:   Diagnosis Date   • Ocular migraine 2/12/2015   • Anxiety and depression 2/12/2015   • Chronic diarrhea 2/12/2015       Surgical History  Past Surgical History:   Procedure Laterality Date   • PRIMARY C SECTION  11/13/2016    Procedure: PRIMARY C SECTION;  Surgeon: Delphine Carbone M.D.;  Location: LABOR AND DELIVERY;  Service:    • ARTHROSCOPY, KNEE     • TONSILLECTOMY         Medications  No current facility-administered medications on file prior to encounter.      Current Outpatient Prescriptions on File Prior to Encounter   Medication Sig Dispense Refill   • alprazolam (XANAX) 0.5 MG Tab Take 0.5 mg by mouth 1 time daily as needed for Anxiety.         Family History  Family History   Problem Relation Age of Onset   • Hypertension Father    • Heart Disease Father    • DVT Father        Social History  Social History   Substance Use Topics   • Smoking status: Never Smoker   • Smokeless tobacco: Never Used   • Alcohol use Yes      Comment: socially    This with boyfriend and 9-month-old child works as  at 's office. Denies any stress at work.    Allergies  Allergies   Allergen Reactions   • Erythromycin      Pt reports \" crying for no reason, and my jaw locked years ago\"    • Pcn [Penicillins] Hives      "   Physical Exam  Laboratory   Hemodynamics  Temp (24hrs), Av.2 °C (97.2 °F), Min:36.2 °C (97.2 °F), Max:36.2 °C (97.2 °F)   Temperature: 36.2 °C (97.2 °F)  Pulse  Av.5  Min: 67  Max: 82 Heart Rate (Monitored): 82  Blood Pressure: 149/93, NIBP: 139/66      Respiratory      Respiration: (!) 24, Pulse Oximetry: 93 %             Physical Exam   Constitutional: She is oriented to person, place, and time. She appears well-developed and well-nourished. No distress.   Nontoxic appearance   HENT:   Head: Normocephalic and atraumatic.   Nose: Nose normal.   Mouth/Throat: Oropharynx is clear and moist. No oropharyngeal exudate.   Eyes: Conjunctivae and EOM are normal. Pupils are equal, round, and reactive to light. Right eye exhibits no discharge. Left eye exhibits no discharge. No scleral icterus.   Neck: Normal range of motion. Neck supple. No JVD present. No tracheal deviation present. No thyromegaly present.   Cardiovascular: Normal rate, regular rhythm, normal heart sounds and intact distal pulses.  Exam reveals no gallop and no friction rub.    No murmur heard.  No murmur, rub or gallop on exam   Pulmonary/Chest: Effort normal and breath sounds normal. No stridor. No respiratory distress. She has no wheezes. She has no rales. She exhibits no tenderness.   Abdominal: Soft. Bowel sounds are normal. She exhibits no distension and no mass. There is no tenderness. There is no rebound and no guarding.   Musculoskeletal: Normal range of motion. She exhibits no edema or tenderness.   No leg edema or tenderness.   Lymphadenopathy:     She has no cervical adenopathy.   Neurological: She is alert and oriented to person, place, and time. No cranial nerve deficit. She exhibits normal muscle tone. Coordination normal.   Skin: Skin is warm and dry. No rash noted. She is not diaphoretic. No erythema.   Psychiatric: She has a normal mood and affect. Her behavior is normal. Judgment and thought content normal.   Nursing note and  vitals reviewed.      Recent Labs      09/06/17   1727   WBC  11.7*   RBC  4.51   HEMOGLOBIN  13.2   HEMATOCRIT  39.6   MCV  87.8   MCH  29.3   MCHC  33.3*   RDW  44.0   PLATELETCT  305   MPV  10.0     Recent Labs      09/06/17   1727   SODIUM  139   POTASSIUM  3.3*   CHLORIDE  106   CO2  24   GLUCOSE  90   BUN  12   CREATININE  0.95   CALCIUM  8.3*     Recent Labs      09/06/17   1727   ALTSGPT  21   ASTSGOT  16   ALKPHOSPHAT  85   TBILIRUBIN  0.5   LIPASE  18   GLUCOSE  90     Recent Labs      09/06/17   1727   APTT  21.7*   INR  0.94     Recent Labs      09/06/17   1727   BNPBTYPENAT  243*         Lab Results   Component Value Date    TROPONINI 0.13 (H) 09/06/2017     Urinalysis:  No results found for: SPECGRAVITY, GLUCOSEUR, KETONES, NITRITE, WBCURINE, RBCURINE, BACTERIA, EPITHELCELL     Imaging  CXR no cardiopulmonary disease   Assessment/Plan     I anticipate this patient will require at least two midnights for appropriate medical management, necessitating inpatient admission.    * CASTELLANO (dyspnea on exertion)- (present on admission)   Assessment & Plan    EKG with normal sinus rhythm rate of 72 no KIYA or depressions  Troponin level elevated at .13  Normal d-dimer  Normal BNP  Viral syndrome noted prior to onset of dyspnea on exertion with myalgias, gastrointestinal symptoms of diarrhea nausea.  Dr. Lassiter consulted by ER P  Recommended stat echocardiogram. Transferred from [facility] to [facility] for stat echocardiogram. Aspirin 325 mg by mouth daily started, appears nonischemic by history, rather myositis.  Replace potassium and phosphorus.  magnesium level normal.  History of DayQuil and nyquil, Tylenol Cold prior to onset of dyspnea on exertion.  Urine drug screen ordered.  personal history of recreational drug use.        Elevated troponin level- (present on admission)   Assessment & Plan    Continue to trend troponins q 4 hours  Telemetry monitoring.  Assessment a 25 mg by mouth daily         Viral gastroenteritis- (present on admission)   Assessment & Plan    Episode of myalgias, diarrhea prior to onset of dyspnea on exertion.  Appears self-limiting.        Hypophosphatemia- (present on admission)   Assessment & Plan    Replace with K-Phos neutral 1 tablet every 6 hours.        Hypokalemia- (present on admission)   Assessment & Plan    Replacement potassium 40 mg by mouth daily.        Anxiety and depression- (present on admission)   Assessment & Plan    Continue Paxil 80 mg by mouth daily, dose.  Ativan when necessary anxiety every 4 hours            VTE prophylaxis: heparin.

## 2017-09-07 NOTE — ASSESSMENT & PLAN NOTE
At this point concerned for possible etiology including PE, pulmonary HTN, also consider viral etiology.  Echocardiogram shows preserved LVEF, elevated RVSP 50mmHg.  Cardiology following, possible LHC?  CTA PE neg, neg d-dimer.  Continue RT protocol, duo nebs, Pep therapy if warranted, and incentive spirometry.

## 2017-09-07 NOTE — PROGRESS NOTES
[facility] Hospitalist Progress Note    Date of Service: 2017    Chief Complaint  37 y.o. female admitted 2017 with hx of anxiety, admitted for dyspnea on exertion    Interval Problem Update  Patient reports having shortness of breath but has improved since time of admission  CTA PE Neg  Echocardiogram shows preserved LVEF, elevated RVSP.  Cardiology pending.    Consultants/Specialty  Cardiology    Disposition  Home when cleared.        Review of Systems   Constitutional: Positive for malaise/fatigue. Negative for chills and fever.   HENT: Negative for congestion, hearing loss, sore throat and tinnitus.    Eyes: Negative for blurred vision, double vision, photophobia and pain.   Respiratory: Positive for shortness of breath. Negative for cough, hemoptysis, sputum production, wheezing and stridor.    Cardiovascular: Negative for chest pain, palpitations, orthopnea, claudication and PND.   Gastrointestinal: Negative for blood in stool, constipation, heartburn, melena, nausea and vomiting.   Genitourinary: Negative for dysuria, frequency and urgency.   Musculoskeletal: Negative for back pain, myalgias and neck pain.   Neurological: Positive for weakness. Negative for dizziness, tingling, tremors, sensory change, speech change and headaches.   Psychiatric/Behavioral: Negative for depression, memory loss and suicidal ideas. The patient is not nervous/anxious.       Physical Exam  Laboratory/Imaging   Hemodynamics  Temp (24hrs), Av.8 °C (98.2 °F), Min:36.7 °C (98 °F), Max:36.9 °C (98.5 °F)   Temperature: 36.8 °C (98.2 °F)  Pulse  Av.5  Min: 59  Max: 72    Blood Pressure: 122/78      Respiratory      Respiration: 20, Pulse Oximetry: 94 %, O2 Daily Delivery Respiratory : Silicone Nasal Cannula     Work Of Breathing / Effort: Mild  RUL Breath Sounds: Clear, RML Breath Sounds: Clear, RLL Breath Sounds: Clear, HEIDI Breath Sounds: Clear, LLL Breath Sounds: Clear    Fluids    Intake/Output Summary (Last 24 hours) at  09/07/17 1648  Last data filed at 09/07/17 0224   Gross per 24 hour   Intake              840 ml   Output                0 ml   Net              840 ml       Nutrition  Orders Placed This Encounter   Procedures   • Diet Order     Standing Status:   Standing     Number of Occurrences:   1     Order Specific Question:   Diet:     Answer:   Cardiac [6]     Physical Exam   Constitutional: She is oriented to person, place, and time. She appears well-developed and well-nourished. No distress.   obese   HENT:   Head: Normocephalic and atraumatic.   Mouth/Throat: No oropharyngeal exudate.   Eyes: Conjunctivae are normal. Pupils are equal, round, and reactive to light. Right eye exhibits no discharge. No scleral icterus.   Neck: Neck supple. No JVD present. No thyromegaly present.   Cardiovascular: Intact distal pulses.    No murmur heard.  Pulmonary/Chest: Effort normal and breath sounds normal. No stridor. No respiratory distress. She has no wheezes. She has no rales.   Abdominal: Soft. Bowel sounds are normal. She exhibits no distension. There is no tenderness. There is no rebound.   Musculoskeletal: Normal range of motion. She exhibits no edema.   Neurological: She is alert and oriented to person, place, and time. No cranial nerve deficit.   Skin: Skin is warm. She is not diaphoretic. No erythema.   Psychiatric: She has a normal mood and affect. Her behavior is normal. Thought content normal.       Recent Labs      09/06/17 1727 09/07/17   0003   WBC  11.7*  11.2*   RBC  4.51  4.06*   HEMOGLOBIN  13.2  12.1   HEMATOCRIT  39.6  36.1*   MCV  87.8  88.9   MCH  29.3  29.8   MCHC  33.3*  33.5*   RDW  44.0  45.1   PLATELETCT  305  270   MPV  10.0  10.2     Recent Labs      09/06/17 1727 09/07/17   0003   SODIUM  139  140   POTASSIUM  3.3*  3.5*   CHLORIDE  106  108   CO2  24  22   GLUCOSE  90  91   BUN  12  12   CREATININE  0.95  0.77   CALCIUM  8.3*  8.5     Recent Labs      09/06/17 1727   APTT  21.7*   INR  0.94      Recent Labs      09/06/17   1727   BNPBTYPENAT  243*     Recent Labs      09/07/17   0003   TRIGLYCERIDE  151*   HDL  39*   LDL  111*          Assessment/Plan     Elevated troponin level- (present on admission)   Assessment & Plan    Decreasing, possible demand ischemia?   EKG normal, echocardiogram shows no wall motion abnormalities .  Cardiology following        CASTELLANO (dyspnea on exertion)- (present on admission)   Assessment & Plan    At this point concerned for possible etiology including PE, pulmonary HTN, also consider viral etiology.  Echocardiogram shows preserved LVEF, elevated RVSP 50mmHg.  Cardiology following, possible LHC?  CTA PE neg, neg d-dimer.  Continue RT protocol, duo nebs, Pep therapy if warranted, and incentive spirometry.           Hypokalemia- (present on admission)   Assessment & Plan    replenish lytes, will repeat bmp in the morning for any changes          Anxiety and depression- (present on admission)   Assessment & Plan    Continue prozac.            Reviewed items::  EKG reviewed, Medications reviewed and Radiology images reviewed  Amador catheter::  No Amador  DVT prophylaxis pharmacological::  Heparin

## 2017-09-07 NOTE — PROGRESS NOTES
Patient was admitted by my colleague from [facility].  Patient is currently stable.  Chart reviewed and patient examined.  For further details please see H&P in chart.  Echocardiogram is pending.

## 2017-09-07 NOTE — ED NOTES
Seen and evaluated by erp-orders recvd and reviewed with pt. Iv started, blood to lab, sr on moniter. Denies c/p or sob currently

## 2017-09-07 NOTE — H&P
"[][facility] Medicine History and Physical     Date of Service  2017     Chief Complaint       Chief Complaint   Patient presents with   • Tachycardia       Pt reports feeling tachycardic, SOB when ambulating since Monday , states \" I feel fine when I'm sitting.\"   • Shortness of Breath       with ambulating , since Monday    • Generalized Body Aches       On Monday the , has since resolved.    • Abdominal Pain       Cramping diffuse abdominal and nausea pain on Monday. Deneis emesis   • Diarrhea       Since , ongoing.    • Congestion       Pt denies cough, reports \" just a lot of phlegm.\" 95% o2 on RA.         History of Presenting Illness  37 y.o. female who presented 2017 with CASTELLANO with walking 15-20 feet to her car.  She did have a viral syndrome over the weekend and took Dayquil, Nyquil and tylenol cold, had loose diarrhea x 2 days, no melena, that stopped today.  She describes severe body aches on  until 17 resolved today.  She has no hypoxia or dyspnea at rest.  She denies any prior cardiac history, she is under no current stress.  She works as a  at the [employer] for [employer].  She does have a 9-month-old child at home and boyfriend lives with her. She had no reported complications during her pregnancy other than  with subsequent localized bleeding the incision site. She reports no history of DVT or PE in the past. She reports no history of rheumatic fever as a child.   Primary Care Physician  Quynh Joel M.D.     Consultants  Dr. Lassiter, cardiology recommended stat echo.     Code Status  Full code     Review of Systems  Review of Systems   Constitutional: Positive for malaise/fatigue. Negative for chills, diaphoresis and fever.   HENT: Negative for congestion and sore throat.    Eyes: Negative for pain and discharge.   Respiratory: Positive for shortness of breath. Negative for cough, hemoptysis, sputum production and " wheezing.    Cardiovascular: Negative for chest pain, palpitations, claudication and leg swelling.   Gastrointestinal: Negative for abdominal pain, constipation, diarrhea, melena, nausea and vomiting.   Genitourinary: Negative for dysuria, frequency and urgency.   Musculoskeletal: Positive for myalgias. Negative for back pain, joint pain and neck pain.   Skin: Negative for itching and rash.   Neurological: Negative for dizziness, sensory change, speech change, focal weakness, loss of consciousness, weakness and headaches.   Endo/Heme/Allergies: Does not bruise/bleed easily.   Psychiatric/Behavioral: Negative for depression, substance abuse and suicidal ideas. The patient is nervous/anxious.        Past Medical History       Past Medical History:   Diagnosis Date   • Ocular migraine 2/12/2015   • Anxiety and depression 2/12/2015   • Chronic diarrhea 2/12/2015         Surgical History        Past Surgical History:   Procedure Laterality Date   • PRIMARY C SECTION   11/13/2016     Procedure: PRIMARY C SECTION;  Surgeon: Delphine Carbone M.D.;  Location: LABOR AND DELIVERY;  Service:    • ARTHROSCOPY, KNEE       • TONSILLECTOMY             Medications  No current facility-administered medications on file prior to encounter.                 Current Outpatient Prescriptions on File Prior to Encounter   Medication Sig Dispense Refill   • alprazolam (XANAX) 0.5 MG Tab Take 0.5 mg by mouth 1 time daily as needed for Anxiety.           Family History  Family History         Family History   Problem Relation Age of Onset   • Hypertension Father     • Heart Disease Father     • DVT Father              Social History           Social History   Substance Use Topics   • Smoking status: Never Smoker   • Smokeless tobacco: Never Used   • Alcohol use Yes         Comment: socially    This with boyfriend and 9-month-old child works as  at 's office. Denies any stress at work.     Allergies        Allergies  "  Allergen Reactions   • Erythromycin         Pt reports \" crying for no reason, and my jaw locked years ago\"    • Pcn [Penicillins] Hives       Physical Exam   Laboratory   Hemodynamics  Temp (24hrs), Av.2 °C (97.2 °F), Min:36.2 °C (97.2 °F), Max:36.2 °C (97.2 °F)   Temperature: 36.2 °C (97.2 °F)  Pulse  Av.5  Min: 67  Max: 82 Heart Rate (Monitored): 82  Blood Pressure: 149/93, NIBP: 139/66       Respiratory      Respiration: (!) 24, Pulse Oximetry: 93 %              Physical Exam   Constitutional: She is oriented to person, place, and time. She appears well-developed and well-nourished. No distress.   Nontoxic appearance   HENT:   Head: Normocephalic and atraumatic.   Nose: Nose normal.   Mouth/Throat: Oropharynx is clear and moist. No oropharyngeal exudate.   Eyes: Conjunctivae and EOM are normal. Pupils are equal, round, and reactive to light. Right eye exhibits no discharge. Left eye exhibits no discharge. No scleral icterus.   Neck: Normal range of motion. Neck supple. No JVD present. No tracheal deviation present. No thyromegaly present.   Cardiovascular: Normal rate, regular rhythm, normal heart sounds and intact distal pulses.  Exam reveals no gallop and no friction rub.    No murmur heard.  No murmur, rub or gallop on exam   Pulmonary/Chest: Effort normal and breath sounds normal. No stridor. No respiratory distress. She has no wheezes. She has no rales. She exhibits no tenderness.   Abdominal: Soft. Bowel sounds are normal. She exhibits no distension and no mass. There is no tenderness. There is no rebound and no guarding.   Musculoskeletal: Normal range of motion. She exhibits no edema or tenderness.   No leg edema or tenderness.   Lymphadenopathy:     She has no cervical adenopathy.   Neurological: She is alert and oriented to person, place, and time. No cranial nerve deficit. She exhibits normal muscle tone. Coordination normal.   Skin: Skin is warm and dry. No rash noted. She is not " diaphoretic. No erythema.   Psychiatric: She has a normal mood and affect. Her behavior is normal. Judgment and thought content normal.   Nursing note and vitals reviewed.          Recent Labs      09/06/17   1727   WBC  11.7*   RBC  4.51   HEMOGLOBIN  13.2   HEMATOCRIT  39.6   MCV  87.8   MCH  29.3   MCHC  33.3*   RDW  44.0   PLATELETCT  305   MPV  10.0          Recent Labs      09/06/17   1727   SODIUM  139   POTASSIUM  3.3*   CHLORIDE  106   CO2  24   GLUCOSE  90   BUN  12   CREATININE  0.95   CALCIUM  8.3*          Recent Labs      09/06/17   1727   ALTSGPT  21   ASTSGOT  16   ALKPHOSPHAT  85   TBILIRUBIN  0.5   LIPASE  18   GLUCOSE  90          Recent Labs      09/06/17   1727   APTT  21.7*   INR  0.94          Recent Labs      09/06/17   1727   BNPBTYPENAT  243*                Lab Results   Component Value Date     TROPONINI 0.13 (H) 09/06/2017      Urinalysis:  No results found for: SPECGRAVITY, GLUCOSEUR, KETONES, NITRITE, WBCURINE, RBCURINE, BACTERIA, EPITHELCELL      Imaging  CXR no cardiopulmonary disease   Assessment/Plan       I anticipate this patient will require at least two midnights for appropriate medical management, necessitating inpatient admission.         * CASTELLANO (dyspnea on exertion)- (present on admission)   Assessment & Plan     EKG with normal sinus rhythm rate of 72 no KIYA or depressions  Troponin level elevated at .13  Normal d-dimer  Normal BNP  Viral syndrome noted prior to onset of dyspnea on exertion with myalgias, gastrointestinal symptoms of diarrhea nausea.  Dr. Lassiter consulted by ER P  Recommended stat echocardiogram. Transferred from [facility] to [facility] for stat echocardiogram. Aspirin 325 mg by mouth daily started, appears nonischemic by history, rather myositis.  Replace potassium and phosphorus.  magnesium level normal.  History of DayQuil and nyquil, Tylenol Cold prior to onset of dyspnea on exertion.  Urine drug screen ordered.  personal history of  recreational drug use.       Elevated troponin level- (present on admission)   Assessment & Plan     Continue to trend troponins q 4 hours  Telemetry monitoring.  Assessment a 25 mg by mouth daily       Viral gastroenteritis- (present on admission)   Assessment & Plan     Episode of myalgias, diarrhea prior to onset of dyspnea on exertion.  Appears self-limiting.       Hypophosphatemia- (present on admission)   Assessment & Plan     Replace with K-Phos neutral 1 tablet every 6 hours.       Hypokalemia- (present on admission)   Assessment & Plan     Replacement potassium 40 mg by mouth daily.       Anxiety and depression- (present on admission)   Assessment & Plan     Continue Paxil 80 mg by mouth daily, dose.  Ativan when necessary anxiety every 4 hours             VTE prophylaxis: heparin.                 Routing History

## 2017-09-07 NOTE — ASSESSMENT & PLAN NOTE
EKG with normal sinus rhythm rate of 72 no KIYA or depressions  Troponin level elevated at .13  Normal d-dimer  Normal BNP  Viral syndrome noted prior to onset of dyspnea on exertion with myalgias, gastrointestinal symptoms of diarrhea nausea.  Dr. Lassiter consulted by ER P  Recommended stat echocardiogram. Transferred from [facility] to [facility] for stat echocardiogram. Aspirin 325 mg by mouth daily started, appears nonischemic by history, rather myositis.  Replace potassium and phosphorus.  magnesium level normal.  History of DayQuil and nyquil, Tylenol Cold prior to onset of dyspnea on exertion.  Urine drug screen ordered.  personal history of recreational drug use.

## 2017-09-07 NOTE — DIETARY
NUTRITION SERVICES: BMI - Pt with BMI >40 (=43.23). Weight loss counseling not appropriate in acute care setting. RECOMMEND - Referral to outpatient nutrition services for weight management after D/C.

## 2017-09-07 NOTE — CARE PLAN
Problem: Communication  Goal: The ability to communicate needs accurately and effectively will improve  Outcome: PROGRESSING AS EXPECTED  Patient verbalizes importance of summoning nursing staff whenever in need of assistance.    Problem: Knowledge Deficit  Goal: Knowledge of disease process/condition, treatment plan, diagnostic tests, and medications will improve  Outcome: PROGRESSING AS EXPECTED  Review daily POC with patient and family. Address any questions and/or concerns in timely, appropriate manner.

## 2017-09-07 NOTE — ED NOTES
[facility] to [facility]. No changes. Transfer packet sent with patient. Patent IV intact.

## 2017-09-07 NOTE — ED PROVIDER NOTES
"ED Provider Note    CHIEF COMPLAINT  Chief Complaint   Patient presents with   • Tachycardia     Pt reports feeling tachycardic, SOB when ambulating since Monday , states \" I feel fine when I'm sitting.\"   • Shortness of Breath     with ambulating , since Monday    • Generalized Body Aches     On Monday the 4th, has since resolved.    • Abdominal Pain     Cramping diffuse abdominal and nausea pain on Monday. Deneis emesis   • Diarrhea     Since 9/4, ongoing.    • Congestion     Pt denies cough, reports \" just a lot of phlegm.\" 95% o2 on RA.       HPI  Modesta Archuleta is a 37 y.o. female who presentsComplaining of shortness of breath and palpitations that she's had since Monday. She states it's worse with exertion. She states that on Monday through Tuesday. She had severe diarrhea. She was nauseous and had a decreased appetite. She states that she does drink caffeine daily, but has not had it in the last several days because she has been ill. Her next period is due any day. She denies sexual activity. She denies any pain with urination or blood in her urine. She denies any abdominal pain. She has no history of chest pain that feels like her heart races and she cannot catch her breath with ambulation. She does not take any medications on a daily basis. She denies any smoking, drinking or drug use. She states she took a Xanax to see if it would help her symptoms because she thought it might be anxiety, but this did not help. Currently, just sitting down, she feels normal.    REVIEW OF SYSTEMS  HEENT:  No ear pain, congestion or sore throat   EYES: no discharge redness or vision changes  CARDIAC: no chest pain, positive palpitations    PULMONARY: no dyspnea, cough or congestion   GI: no vomiting . Positive diarrhea. No abdominal pain   : no dysuria, back pain or hematuria   Neuro: no weakness, numbness aphasia or headache  Musculoskeletal: no swelling deformity or pain no joint swelling  Endocrine: no fevers, sweating, " "weight loss   SKIN: no rash, erythema or contusions     See history of present illness all other systems are negative    PAST MEDICAL HISTORY  Past Medical History:   Diagnosis Date   • Ocular migraine 2/12/2015   • Anxiety and depression 2/12/2015   • Chronic diarrhea 2/12/2015       FAMILY HISTORY  Family History   Problem Relation Age of Onset   • Hypertension Father    • Heart Disease Father    • DVT Father        SOCIAL HISTORY  Social History     Social History   • Marital status: Single     Spouse name: N/A   • Number of children: N/A   • Years of education: N/A     Social History Main Topics   • Smoking status: Never Smoker   • Smokeless tobacco: Never Used   • Alcohol use Yes      Comment: socially    • Drug use: No   • Sexual activity: Not on file      Comment:      Other Topics Concern   • Not on file     Social History Narrative   • No narrative on file       SURGICAL HISTORY  Past Surgical History:   Procedure Laterality Date   • PRIMARY C SECTION  11/13/2016    Procedure: PRIMARY C SECTION;  Surgeon: Delphine Carbone M.D.;  Location: LABOR AND DELIVERY;  Service:    • ARTHROSCOPY, KNEE     • TONSILLECTOMY         CURRENT MEDICATIONS  Home Medications     Reviewed by Missy Solano PhT (Pharmacy Tech) on 09/06/17 at 1656  Med List Status: Complete   Medication Last Dose Status   alprazolam (XANAX) 0.5 MG Tab 9/5/2017 Active   fluoxetine (PROZAC) 40 MG capsule 9/6/2017 Active                ALLERGIES  Allergies   Allergen Reactions   • Erythromycin      Pt reports \" crying for no reason, and my jaw locked years ago\"    • Pcn [Penicillins] Hives       PHYSICAL EXAM  VITAL SIGNS: /93   Pulse 82   Temp 36.2 °C (97.2 °F)   Resp (!) 24   Ht 1.702 m (5' 7\")   Wt 123.4 kg (272 lb 0.8 oz)   SpO2 93%   BMI 42.61 kg/m²   Constitutional: Well developed, Well nourished, No acute distress, Non-toxic appearance.   HEENT: Normocephalic, Atraumatic,  external ears normal, pharynx pink,  Mucous  " Membranes Slightly dry, No rhinorrhea or mucosal edema  Eyes: PERRL, EOMI, Conjunctiva normal, No discharge.   Neck: Normal range of motion, No tenderness, Supple, No stridor.   Lymphatic: No lymphadenopathy    Cardiovascular: Regular Rate and Rhythm, No murmurs,  rubs, or gallops.   Thorax & Lungs: Lungs clear to auscultation bilaterally, No respiratory distress, No wheezes, rhales or rhonchi, No chest wall tenderness.   Abdomen: Bowel sounds normal, Soft, non tender, non distended,  No pulsatile masses., no rebound guarding or peritoneal signs.   Skin: Warm, Dry, No erythema, No rash,   Back:  No CVA tenderness,  No spinal tenderness, bony crepitance step offs or instability.   Extremities: Equal, intact distal pulses, No cyanosis, clubbing or edema,  No tenderness.   Musculoskeletal: Good range of motion in all major joints. No tenderness to palpation or major deformities noted.   Neurologic: Alert & oriented x 3, Cranial nerves II-XII intact, Equal strength and sensation upper and lower extremities bilaterally,  No focal deficits noted.   Psychiatric: Affect normal, Judgment normal, Mood normal.     EKG  EKG Interpretation    Interpreted by emergency department physician    Rhythm: normal sinus   Rate: normal  Axis: normal  Ectopy: none  Conduction: normal  ST Segments: normal  T Waves: normal  Q Waves: none    Clinical Impression: Normal EKG        RADIOLOGY/PROCEDURES  DX-CHEST-PORTABLE (1 VIEW)   Final Result      No acute cardiac or pulmonary abnormalities are identified.      ECHOCARDIOGRAM COMP W/O CONT    (Results Pending)         COURSE & MEDICAL DECISION MAKING  Pertinent Labs & Imaging studies reviewed. (See chart for details)  Differential diagnosis: Dehydration, thyroid dysfunction, or joint disturbance, cardiac arrhythmia    5:15 PM an IV was started and gave the patient a liter of fluid because of her profuse diarrhea that she had and decreased appetite for the last several days.      Results for  orders placed or performed during the hospital encounter of 09/06/17   CBC with Differential   Result Value Ref Range    WBC 11.7 (H) 4.8 - 10.8 K/uL    RBC 4.51 4.20 - 5.40 M/uL    Hemoglobin 13.2 12.0 - 16.0 g/dL    Hematocrit 39.6 37.0 - 47.0 %    MCV 87.8 81.4 - 97.8 fL    MCH 29.3 27.0 - 33.0 pg    MCHC 33.3 (L) 33.6 - 35.0 g/dL    RDW 44.0 35.9 - 50.0 fL    Platelet Count 305 164 - 446 K/uL    MPV 10.0 9.0 - 12.9 fL    Neutrophils-Polys 67.40 44.00 - 72.00 %    Lymphocytes 22.20 22.00 - 41.00 %    Monocytes 6.20 0.00 - 13.40 %    Eosinophils 3.40 0.00 - 6.90 %    Basophils 0.50 0.00 - 1.80 %    Immature Granulocytes 0.30 0.00 - 0.90 %    Nucleated RBC 0.00 /100 WBC    Neutrophils (Absolute) 7.87 (H) 2.00 - 7.15 K/uL    Lymphs (Absolute) 2.59 1.00 - 4.80 K/uL    Monos (Absolute) 0.73 0.00 - 0.85 K/uL    Eos (Absolute) 0.40 0.00 - 0.51 K/uL    Baso (Absolute) 0.06 0.00 - 0.12 K/uL    Immature Granulocytes (abs) 0.04 0.00 - 0.11 K/uL    NRBC (Absolute) 0.00 K/uL   Complete Metabolic Panel (CMP)   Result Value Ref Range    Sodium 139 135 - 145 mmol/L    Potassium 3.3 (L) 3.6 - 5.5 mmol/L    Chloride 106 96 - 112 mmol/L    Co2 24 20 - 33 mmol/L    Anion Gap 9.0 0.0 - 11.9    Glucose 90 65 - 99 mg/dL    Bun 12 8 - 22 mg/dL    Creatinine 0.95 0.50 - 1.40 mg/dL    Calcium 8.3 (L) 8.4 - 10.2 mg/dL    AST(SGOT) 16 12 - 45 U/L    ALT(SGPT) 21 2 - 50 U/L    Alkaline Phosphatase 85 30 - 99 U/L    Total Bilirubin 0.5 0.1 - 1.5 mg/dL    Albumin 3.6 3.2 - 4.9 g/dL    Total Protein 7.0 6.0 - 8.2 g/dL    Globulin 3.4 1.9 - 3.5 g/dL    A-G Ratio 1.1 g/dL   Btype Natriuretic Peptide (BNP)   Result Value Ref Range    B Natriuretic Peptide 243 (H) 0 - 100 pg/mL   Prothrombin Time (PT/INR)   Result Value Ref Range    PT 12.4 12.0 - 14.6 sec    INR 0.94 0.87 - 1.13   APTT   Result Value Ref Range    APTT 21.7 (L) 24.7 - 36.0 sec   Lipase   Result Value Ref Range    Lipase 18 7 - 58 U/L   Troponin STAT   Result Value Ref Range     Troponin I 0.13 (H) 0.00 - 0.04 ng/mL   Phosphorus   Result Value Ref Range    Phosphorus 2.4 (L) 2.5 - 4.5 mg/dL   Magnesium   Result Value Ref Range    Magnesium 1.9 1.5 - 2.5 mg/dL   D-Dimer   Result Value Ref Range    D-Dimer Screen <200 <250 ng/mL(D-DU)   HCG QUAL SERUM   Result Value Ref Range    Beta-Hcg Qualitative Serum Negative Negative   ESTIMATED GFR   Result Value Ref Range    GFR If African American >60 >60 mL/min/1.73 m 2    GFR If Non African American >60 >60 mL/min/1.73 m 2   EKG (NOW)   Result Value Ref Range    Report       [facility]    Test Date:  2017  Pt Name:    TWIN ABDUL                Department: [facility]  MRN:        [id number]                      Room:  Gender:     F                            Technician: NATALIE  :        1980                   Requested By:ER TRIAGE PROTOCOL  Order #:    [id number]                    Reading MD:    Measurements  Intervals                                Axis  Rate:       72                           P:          55  NY:         127                          QRS:        38  QRSD:       86                           T:          38  QT:         424  QTc:        465    Interpretive Statements  Sinus rhythm  No previous ECG available for comparison        Patient has had high blood pressure while in the emergency department, felt likely secondary to medical condition. Counseled patient to monitor blood pressure at home and follow up with primary care physician.    The patient's troponin is elevated. I spoke with Dr. Hinojosa cardiology, who recommends a stat echo and wants her to be admitted and he will follow in consult. Our echo tech is not available after 430 p.m. She'll be transferred to [facility] to receive the echo and further cardiology workup. Patient understands her need for transfer at this time and for admission.    FINAL IMPRESSION  1. CASTELLANO (dyspnea on exertion)    2. Elevated troponin    3. Possible  myocarditis       PLAN/DISPOSITION  Transferred to [facility] for further cardiac workup.          Electronically signed by: Farideh Cali, 9/6/2017 5:07 PM

## 2017-09-07 NOTE — ED NOTES
Report from Priyanka at 1750 - assumed care of patient. Rounded on patient. Patient denies chest pain. Patient texting with family. Call light in reach.

## 2017-09-07 NOTE — PROGRESS NOTES
Patient A+Ox4, up self, denies pain or discomfort. Voiding without issue. Using call bell approp, hourly rounding in place, in room across from nurses station. Bed locked and in lowest position, RN and CNA numbers on whiteboard.

## 2017-09-07 NOTE — ASSESSMENT & PLAN NOTE
Decreasing, possible demand ischemia?   EKG normal, echocardiogram shows no wall motion abnormalities .  Cardiology following

## 2017-09-07 NOTE — ED NOTES
Telephone report to Franky ELIAS at ext [phone]. Patient to transfer STAT via [facility] per Dr. Cali for Echo and cardiology. Patient to [place] [id number]. RN to page Dr. Lassiter/Cardiology on arrival.

## 2017-09-08 ENCOUNTER — PATIENT OUTREACH (OUTPATIENT)
Dept: HEALTH INFORMATION MANAGEMENT | Facility: OTHER | Age: 37
End: 2017-09-08

## 2017-09-08 VITALS
TEMPERATURE: 98.5 F | WEIGHT: 270.5 LBS | HEIGHT: 67 IN | RESPIRATION RATE: 16 BRPM | SYSTOLIC BLOOD PRESSURE: 135 MMHG | OXYGEN SATURATION: 96 % | HEART RATE: 59 BPM | BODY MASS INDEX: 42.46 KG/M2 | DIASTOLIC BLOOD PRESSURE: 77 MMHG

## 2017-09-08 PROBLEM — E87.6 HYPOKALEMIA: Status: RESOLVED | Noted: 2017-09-06 | Resolved: 2017-09-08

## 2017-09-08 PROBLEM — R79.89 ELEVATED TROPONIN LEVEL: Status: RESOLVED | Noted: 2017-09-06 | Resolved: 2017-09-08

## 2017-09-08 LAB
ALBUMIN SERPL BCP-MCNC: 3.5 G/DL (ref 3.2–4.9)
ALBUMIN/GLOB SERPL: 1.2 G/DL
ALP SERPL-CCNC: 80 U/L (ref 30–99)
ALT SERPL-CCNC: 21 U/L (ref 2–50)
ANION GAP SERPL CALC-SCNC: 10 MMOL/L (ref 0–11.9)
AST SERPL-CCNC: 15 U/L (ref 12–45)
BASOPHILS # BLD AUTO: 0.6 % (ref 0–1.8)
BASOPHILS # BLD: 0.06 K/UL (ref 0–0.12)
BILIRUB SERPL-MCNC: 0.4 MG/DL (ref 0.1–1.5)
BUN SERPL-MCNC: 10 MG/DL (ref 8–22)
CALCIUM SERPL-MCNC: 8.9 MG/DL (ref 8.5–10.5)
CHLORIDE SERPL-SCNC: 107 MMOL/L (ref 96–112)
CO2 SERPL-SCNC: 23 MMOL/L (ref 20–33)
CREAT SERPL-MCNC: 0.77 MG/DL (ref 0.5–1.4)
EOSINOPHIL # BLD AUTO: 0.4 K/UL (ref 0–0.51)
EOSINOPHIL NFR BLD: 4.1 % (ref 0–6.9)
ERYTHROCYTE [DISTWIDTH] IN BLOOD BY AUTOMATED COUNT: 43.8 FL (ref 35.9–50)
GFR SERPL CREATININE-BSD FRML MDRD: >60 ML/MIN/1.73 M 2
GLOBULIN SER CALC-MCNC: 3 G/DL (ref 1.9–3.5)
GLUCOSE SERPL-MCNC: 83 MG/DL (ref 65–99)
HCT VFR BLD AUTO: 37.3 % (ref 37–47)
HGB BLD-MCNC: 12.4 G/DL (ref 12–16)
IMM GRANULOCYTES # BLD AUTO: 0.03 K/UL (ref 0–0.11)
IMM GRANULOCYTES NFR BLD AUTO: 0.3 % (ref 0–0.9)
LYMPHOCYTES # BLD AUTO: 2.96 K/UL (ref 1–4.8)
LYMPHOCYTES NFR BLD: 30.5 % (ref 22–41)
MAGNESIUM SERPL-MCNC: 1.9 MG/DL (ref 1.5–2.5)
MCH RBC QN AUTO: 29.4 PG (ref 27–33)
MCHC RBC AUTO-ENTMCNC: 33.2 G/DL (ref 33.6–35)
MCV RBC AUTO: 88.4 FL (ref 81.4–97.8)
MONOCYTES # BLD AUTO: 0.55 K/UL (ref 0–0.85)
MONOCYTES NFR BLD AUTO: 5.7 % (ref 0–13.4)
NEUTROPHILS # BLD AUTO: 5.71 K/UL (ref 2–7.15)
NEUTROPHILS NFR BLD: 58.8 % (ref 44–72)
NRBC # BLD AUTO: 0 K/UL
NRBC BLD AUTO-RTO: 0 /100 WBC
PLATELET # BLD AUTO: 289 K/UL (ref 164–446)
PMV BLD AUTO: 10.4 FL (ref 9–12.9)
POTASSIUM SERPL-SCNC: 3.6 MMOL/L (ref 3.6–5.5)
PROT SERPL-MCNC: 6.5 G/DL (ref 6–8.2)
RBC # BLD AUTO: 4.22 M/UL (ref 4.2–5.4)
SODIUM SERPL-SCNC: 140 MMOL/L (ref 135–145)
WBC # BLD AUTO: 9.7 K/UL (ref 4.8–10.8)

## 2017-09-08 PROCEDURE — 83735 ASSAY OF MAGNESIUM: CPT

## 2017-09-08 PROCEDURE — 700101 HCHG RX REV CODE 250: Performed by: HOSPITALIST

## 2017-09-08 PROCEDURE — 99239 HOSP IP/OBS DSCHRG MGMT >30: CPT | Performed by: HOSPITALIST

## 2017-09-08 PROCEDURE — A9270 NON-COVERED ITEM OR SERVICE: HCPCS | Performed by: HOSPITALIST

## 2017-09-08 PROCEDURE — 700102 HCHG RX REV CODE 250 W/ 637 OVERRIDE(OP): Performed by: HOSPITALIST

## 2017-09-08 PROCEDURE — 36415 COLL VENOUS BLD VENIPUNCTURE: CPT

## 2017-09-08 PROCEDURE — 94640 AIRWAY INHALATION TREATMENT: CPT

## 2017-09-08 PROCEDURE — 85025 COMPLETE CBC W/AUTO DIFF WBC: CPT

## 2017-09-08 PROCEDURE — 80053 COMPREHEN METABOLIC PANEL: CPT

## 2017-09-08 RX ORDER — ALBUTEROL SULFATE 90 UG/1
2 AEROSOL, METERED RESPIRATORY (INHALATION) EVERY 4 HOURS PRN
Qty: 1 INHALER | Refills: 3 | Status: SHIPPED | OUTPATIENT
Start: 2017-09-08 | End: 2017-10-30

## 2017-09-08 RX ADMIN — FLUOXETINE HYDROCHLORIDE 80 MG: 20 CAPSULE ORAL at 08:34

## 2017-09-08 RX ADMIN — POTASSIUM CHLORIDE 40 MEQ: 1500 TABLET, EXTENDED RELEASE ORAL at 08:34

## 2017-09-08 RX ADMIN — ALBUTEROL SULFATE 2.5 MG: 2.5 SOLUTION RESPIRATORY (INHALATION) at 11:39

## 2017-09-08 RX ADMIN — DIBASIC SODIUM PHOSPHATE, MONOBASIC POTASSIUM PHOSPHATE AND MONOBASIC SODIUM PHOSPHATE 1 TABLET: 852; 155; 130 TABLET ORAL at 18:32

## 2017-09-08 RX ADMIN — DIBASIC SODIUM PHOSPHATE, MONOBASIC POTASSIUM PHOSPHATE AND MONOBASIC SODIUM PHOSPHATE 1 TABLET: 852; 155; 130 TABLET ORAL at 05:05

## 2017-09-08 RX ADMIN — ASPIRIN 325 MG: 325 TABLET, COATED ORAL at 08:34

## 2017-09-08 ASSESSMENT — ENCOUNTER SYMPTOMS
ORTHOPNEA: 0
PALPITATIONS: 0
PND: 0
CLAUDICATION: 0
SHORTNESS OF BREATH: 1
COUGH: 0

## 2017-09-08 ASSESSMENT — PAIN SCALES - GENERAL
PAINLEVEL_OUTOF10: 0

## 2017-09-08 ASSESSMENT — LIFESTYLE VARIABLES: EVER_SMOKED: NEVER

## 2017-09-08 NOTE — PROGRESS NOTES
Pricilla Campbell Fall Risk Assessment:     Last Known Fall: No falls  Mobility: No limitations  Medications: Cardiovascular or central nervous system meds  Mental Status/LOC/Awareness: Awake, alert, and oriented to date, place, and person  Toileting Needs: No needs  Volume/Electrolyte Status: No problems  Communication/Sensory: No deficits  Behavior: Appropriate behavior  Pricilla Campbell Fall Risk Total: 2  Fall Risk Level: NO RISK    Universal Fall Precautions:  call light/belongings in reach, bed in low position and locked, wheelchairs and assistive devices out of sight, siderails up x 2, use non-slip footwear, adequate lighting, clutter free and spill free environment, educate on level of risk, educate to call for assistance    Fall Risk Level Interventions:          Patient Specific Interventions:     Medication: review medications with patient and family  Mental Status/LOC/Awareness: reinforce falls education, check on patient hourly and reinforce the use of call light  Toileting: instruct patient/family on the need to call for assistance when toileting  Volume/Electrolyte Status: monitor abnormal lab values  Communication/Sensory: update plan of care on whiteboard  Behavioral: administer medication as ordered and instruct/reinforce fall program rationale  Mobility: ensure bed is locked and in lowest position

## 2017-09-08 NOTE — CARE PLAN
Problem: Safety  Goal: Will remain free from injury  Outcome: PROGRESSING AS EXPECTED  Pt educated on the importance of using call light before getting out of bed. Pt call appropriately and waits for hospital staff to assist with ambulation.      Problem: Venous Thromboembolism (VTW)/Deep Vein Thrombosis (DVT) Prevention:  Goal: Patient will participate in Venous Thrombosis (VTE)/Deep Vein Thrombosis (DVT)Prevention Measures  Pt under stand the use of the heparin to prevent DVTs in the lower extremities.

## 2017-09-08 NOTE — CARE PLAN
Problem: Infection  Goal: Will remain free from infection  Outcome: PROGRESSING AS EXPECTED  VSS, afebrile, no s/s infection

## 2017-09-08 NOTE — PROGRESS NOTES
Cardiology Progress Note               Author: Basia Becerril Date & Time created: 2017  9:51 AM     Interval History:  Consultation for CASTELLANO    Admitted with dyspnea on exertion (by walking 20 feet to her car)    History of obesity, possible sleep apnea, anxiety      Labs reviewed CBC and CMP in excellent order  U tox negative  Troponin 0.13, trending down    D-dimer normal  TSH normal      BP = 140/70  HR = 50 SB/NSR    Echocardiogram 17  LVEF 60%, RVSP 50 mmHg, no valvular abnormality      CTA chest negative for PE    Review of Systems   Respiratory: Positive for shortness of breath. Negative for cough.    Cardiovascular: Negative for chest pain, palpitations, orthopnea, claudication, leg swelling and PND.       Physical Exam   Constitutional: She is oriented to person, place, and time.   obesity   HENT:   Head: Normocephalic.   Eyes: Conjunctivae are normal.   Neck: Normal range of motion. No JVD present.   Pulmonary/Chest: She has no wheezes.   Abdominal: Soft.   Musculoskeletal: She exhibits no edema.   Neurological: She is alert and oriented to person, place, and time.   Skin: Skin is dry.       Hemodynamics:  Temp (24hrs), Av.7 °C (98.1 °F), Min:36.3 °C (97.4 °F), Max:37.2 °C (98.9 °F)  Temperature: 36.3 °C (97.4 °F)  Pulse  Av.1  Min: 55  Max: 72   Blood Pressure: 140/72     Respiratory:    Respiration: 16, Pulse Oximetry: 95 %     Work Of Breathing / Effort: Mild  RUL Breath Sounds: Clear, RML Breath Sounds: Clear, RLL Breath Sounds: Clear, HEIDI Breath Sounds: Clear, LLL Breath Sounds: Clear  Fluids:     Weight: 122.7 kg (270 lb 8.1 oz)  GI/Nutrition:  Orders Placed This Encounter   Procedures   • Diet Order     Standing Status:   Standing     Number of Occurrences:   1     Order Specific Question:   Diet:     Answer:   Cardiac [6]     Lab Results:  Recent Labs      17   1727  17   0003  17   0304   WBC  11.7*  11.2*  9.7   RBC  4.51  4.06*  4.22   HEMOGLOBIN  13.2   12.1  12.4   HEMATOCRIT  39.6  36.1*  37.3   MCV  87.8  88.9  88.4   MCH  29.3  29.8  29.4   MCHC  33.3*  33.5*  33.2*   RDW  44.0  45.1  43.8   PLATELETCT  305  270  289   MPV  10.0  10.2  10.4     Recent Labs      09/06/17   1727  09/07/17   0003  09/08/17   0304   SODIUM  139  140  140   POTASSIUM  3.3*  3.5*  3.6   CHLORIDE  106  108  107   CO2  24  22  23   GLUCOSE  90  91  83   BUN  12  12  10   CREATININE  0.95  0.77  0.77   CALCIUM  8.3*  8.5  8.9     Recent Labs      09/06/17 1727   APTT  21.7*   INR  0.94     Recent Labs      09/06/17   1727   BNPBTYPENAT  243*     Recent Labs      09/06/17   1727  09/07/17   0003   TROPONINI  0.13*  0.09*   BNPBTYPENAT  243*   --      Recent Labs      09/07/17   0003   TRIGLYCERIDE  151*   HDL  39*   LDL  111*         Medical Decision Making, by Problem:  Active Hospital Problems    Diagnosis   • CASTELLANO (dyspnea on exertion) [R06.09]   • Elevated troponin level [R74.8]   • Hypokalemia [E87.6]   • Anxiety and depression [F41.9, F32.9]       Assessment/Plan:    Dyspnea on exertion, stable at rest, labored with exertion, not better    Troponin peaked at 0.13, EKG abnormal T wave in V1-V3, denies chest discomfort    CTA PE  Negative. D-dimer normal     Echocardiogram with preserved EF, RVSP 50 mmHg ( likely 2/2 to obesity, query sleep apnea ), needs out patient work up    No rhythm issues overnight, normal sinus rhythm    We'll discuss with Dr. Lockett      Reviewed items::  Medications reviewed and Labs reviewed

## 2017-09-08 NOTE — DISCHARGE SUMMARY
CHIEF COMPLAINT ON ADMISSION  No chief complaint on file.      CODE STATUS  Full Code    HPI & HOSPITAL COURSE  This is a 37 y.o. female here with exertional dyspnea. On admission patient had mild elevated troponin's of 0.13, with negative EKG, prompting cardiology consultation. Hence an stat echocardiogram was performed which showed preserved LVEF but elevated RVSP of 50mmHg. This prompted us to perform a CTA PE which was luckily negative. Hence at this point cardiology believes that her right heart elevated pressures are possibly due to sleep apnea. We did provide her a breathing treatment with minimal improvement in her ambulatory pulse-ox. She does note feeling better however, we have ordered her oxygen, and set up a pulmonary outpatient follow up for possible sleep study. I have also provided her a rescue inhaler and qvar thinking that some of the burning and smoke outside may contribute to her symptoms, but she does not have any wheezing. At this point patient is clinically stable. Patient denies fevers/chills, chest pain, shortness of breath or nausea/vommiting.       Therefore, she is discharged in good and stable condition with close outpatient follow-up.    SPECIFIC OUTPATIENT FOLLOW-UP  Pulmonary [facility]  PCP    DISCHARGE PROBLEM LIST  Active Problems:    CASTELLANO (dyspnea on exertion) POA: Yes    Anxiety and depression POA: Yes  Resolved Problems:    Elevated troponin level POA: Yes    Hypokalemia POA: Yes      FOLLOW UP  Future Appointments  Date Time Provider Department [facility]   [facility]   11/28/2017 11:00 AM [facility] None     No follow-up provider specified.    MEDICATIONS ON DISCHARGE   Modesta Archuleta   Home Medication Instructions TOBI:[id number]    Printed on:09/08/17 9511   Medication Information                      albuterol 108 (90 Base) MCG/ACT Aero Soln inhalation aerosol  Inhale 2 Puffs by mouth every four hours as needed for Shortness of Breath.              alprazolam (XANAX) 0.5 MG Tab  Take 0.5 mg by mouth 1 time daily as needed for Anxiety.             beclomethasone (QVAR) 40 MCG/ACT inhaler  Inhale 1 Puff by mouth 2 Times a Day.             fluoxetine (PROZAC) 40 MG capsule  Take 80 mg by mouth every day.                 DIET  Orders Placed This Encounter   Procedures   • Diet Order     Standing Status:   Standing     Number of Occurrences:   1     Order Specific Question:   Diet:     Answer:   Cardiac [6]       ACTIVITY  As tolerated.    CONSULTATIONS  Pulmonary     PROCEDURES  None    LABORATORY  Lab Results   Component Value Date/Time    SODIUM 140 09/08/2017 03:04 AM    POTASSIUM 3.6 09/08/2017 03:04 AM    CHLORIDE 107 09/08/2017 03:04 AM    CO2 23 09/08/2017 03:04 AM    GLUCOSE 83 09/08/2017 03:04 AM    BUN 10 09/08/2017 03:04 AM    CREATININE 0.77 09/08/2017 03:04 AM        Lab Results   Component Value Date/Time    WBC 9.7 09/08/2017 03:04 AM    HEMOGLOBIN 12.4 09/08/2017 03:04 AM    HEMATOCRIT 37.3 09/08/2017 03:04 AM    PLATELETCT 289 09/08/2017 03:04 AM        Total time of the discharge process exceeds 38 minutes

## 2017-09-08 NOTE — PROGRESS NOTES
Report received at 7:00 AP from day RN, pt is awake and alert in bed with no reports of pain or discomfort. Pt reports having frequent diarrhea. Bed is locked in lowest position with call light, belongings within reach, white board updated, and POC discussed. All needs met at this time.

## 2017-09-08 NOTE — CARE PLAN
Problem: Safety  Goal: Will remain free from falls  Outcome: PROGRESSING AS EXPECTED  Discussed safety measures with pt. Pt refuses bed alarm, pt is up self with a steady gait. Placed call light within reach. Pt verbalized understanding    Problem: Knowledge Deficit  Goal: Knowledge of disease process/condition, treatment plan, diagnostic tests, and medications will improve  Outcome: PROGRESSING AS EXPECTED  Discussed POC with pt, answered all questions she had for me.

## 2017-09-08 NOTE — FACE TO FACE
Face to Face Note  -  Durable Medical Equipment    Stefano Villalba M.D. - NPI: [id number]  I certify that this patient is under my care and that they had a durable medical equipment(DME)face to face encounter by myself that meets the physician DME face-to-face encounter requirements with this patient on:    Date of encounter:   Patient:                    MRN:                       YOB: 2015  Modesta Archuleta  [id number]  1980     The encounter with the patient was in whole, or in part, for the following medical condition, which is the primary reason for durable medical equipment:  COPD    I certify that, based on my findings, the following durable medical equipment is medically necessary:  Oxygen.    HOME O2 Saturation Measurements:(Values must be present for Home Oxygen orders)  Room air sat at rest: 94  Room air sat with amb: 88  With liters of O2: 2, O2 sat at rest with O2: 96  With Liters of O2: 2, O2 sat with amb with O2 : 98  Is the patient mobile?: Yes    My Clinical findings support the need for the above equipment due to:  Hypoxia    Supporting Symptoms: shortness of breath

## 2017-09-09 LAB
PROT C ACT/NOR PPP: 163 % (ref 83–168)
PROT S ACT/NOR PPP: 118 % (ref 57–131)

## 2017-09-09 NOTE — PROGRESS NOTES
Pt received discharged paper work, IV access site removed, pt removed from tele monitor, CNA took pt off floor via wheelchair, pt left hospital via own transportation

## 2017-09-09 NOTE — DISCHARGE PLANNING
Received choice form from [facility] at [phone].  Referral sent to [facility] at 1760 on 09-08-17.

## 2017-09-09 NOTE — DISCHARGE INSTRUCTIONS
Discharge Instructions    Discharged to home by car with friend. Discharged via wheelchair, hospital escort: Yes.  Special equipment needed: Oxygen    Be sure to schedule a follow-up appointment with your primary care doctor or any specialists as instructed.     Discharge Plan:   Diet Plan: Discussed  Activity Level: Discussed  Confirmed Follow up Appointment: Appointment Scheduled  Confirmed Symptoms Management: Discussed  Medication Reconciliation Updated: Yes  Influenza Vaccine Indication: Patient Refuses    I understand that a diet low in cholesterol, fat, and sodium is recommended for good health. Unless I have been given specific instructions below for another diet, I accept this instruction as my diet prescription.   Other diet: regular    Special Instructions: None    · Is patient discharged on Warfarin / Coumadin?   No     · Is patient Post Blood Transfusion?  No    Depression / Suicide Risk    As you are discharged from this [facility] facility, it is important to learn how to keep safe from harming yourself.    Recognize the warning signs:  · Abrupt changes in personality, positive or negative- including increase in energy   · Giving away possessions  · Change in eating patterns- significant weight changes-  positive or negative  · Change in sleeping patterns- unable to sleep or sleeping all the time   · Unwillingness or inability to communicate  · Depression  · Unusual sadness, discouragement and loneliness  · Talk of wanting to die  · Neglect of personal appearance   · Rebelliousness- reckless behavior  · Withdrawal from people/activities they love  · Confusion- inability to concentrate     If you or a loved one observes any of these behaviors or has concerns about self-harm, here's what you can do:  · Talk about it- your feelings and reasons for harming yourself  · Remove any means that you might use to hurt yourself (examples: pills, rope, extension cords, firearm)  · Get professional help from the  community (Mental Health, Substance Abuse, psychological counseling)  · Do not be alone:Call your Safe Contact- someone whom you trust who will be there for you.  · Call your local CRISIS HOTLINE 979-2993 or 573-394-9844  · Call your local Children's Mobile Crisis Response Team [place] (816) 166-2525 or www.[employer]  · Call the toll free National Suicide Prevention Hotlines   · National Suicide Prevention Lifeline 723-555-BQXG (6669)  · National Hope Line Network 800-SUICIDE (789-8037)    Shortness of Breath  Shortness of breath means you have trouble breathing. Shortness of breath needs medical care right away.  HOME CARE   · Do not smoke.  · Avoid being around chemicals or things (paint fumes, dust) that may bother your breathing.  · Rest as needed. Slowly begin your normal activities.  · Only take medicines as told by your doctor.  · Keep all doctor visits as told.  GET HELP RIGHT AWAY IF:   · Your shortness of breath gets worse.  · You feel lightheaded, pass out (faint), or have a cough that is not helped by medicine.  · You cough up blood.  · You have pain with breathing.  · You have pain in your chest, arms, shoulders, or belly (abdomen).  · You have a fever.  · You cannot walk up stairs or exercise the way you normally do.  · You do not get better in the time expected.  · You have a hard time doing normal activities even with rest.  · You have problems with your medicines.  · You have any new symptoms.  MAKE SURE YOU:  · Understand these instructions.  · Will watch your condition.  · Will get help right away if you are not doing well or get worse.     This information is not intended to replace advice given to you by your health care provider. Make sure you discuss any questions you have with your health care provider.     Document Released: 06/05/2009 Document Revised: 12/23/2014 Document Reviewed: 03/04/2013  Elsevier Interactive Patient Education ©2016 [employer]vier Inc[employer]

## 2017-09-09 NOTE — DISCHARGE PLANNING
Medical Social Work     SW called bedside RN and advised her that [facility] should be at pt bedside within the hour.

## 2017-09-09 NOTE — DISCHARGE PLANNING
Spoke with [facility]LEXAND[facility] Simmons, per Mae Cain they close in 30 minutes and referral takes an hour to hit there system.  Per AMEE Simmons referral has been sent to [facility].  Spoke with Bria at [facility] she will bedside in about 1 hr.

## 2017-09-09 NOTE — DISCHARGE PLANNING
SW met with pt at bedside regarding O2 choice. Pt gave verbal consent for referral to be sent to [facility].     SW faxed choice form to [facility].

## 2017-09-10 LAB — F5 P.R506Q BLD/T QL: NEGATIVE

## 2017-09-12 ENCOUNTER — TELEPHONE (OUTPATIENT)
Dept: MEDICAL GROUP | Facility: PHYSICIAN GROUP | Age: 37
End: 2017-09-12

## 2017-09-12 NOTE — TELEPHONE ENCOUNTER
ESTABLISHED PATIENT PRE-VISIT PLANNING     Note: Patient will not be contacted if there is no indication to call.     1.  Reviewed notes from the last few office visits within the medical group: Yes    2.  If any orders were placed at last visit or intended to be done for this visit (i.e. 6 mos follow-up), do we have Results/Consult Notes?        •  Labs - Labs ordered, completed and results are in chart.       •  Imaging - Imaging ordered, completed and results are in chart.       •  Referrals - No referrals were ordered at last office visit.    3. Is this appointment scheduled as a Hospital Follow-Up? Yes, visit was at [facility].     4.  Immunizations were updated in Epic using WebIZ?: Epic matches WebIZ       •  Web Iz Recommendations: FLU    5.  Patient is due for the following Health Maintenance Topics:   Health Maintenance Due   Topic Date Due   • IMM INFLUENZA (1) 09/01/2017

## 2017-09-13 ENCOUNTER — OFFICE VISIT (OUTPATIENT)
Dept: MEDICAL GROUP | Facility: PHYSICIAN GROUP | Age: 37
End: 2017-09-13
Payer: COMMERCIAL

## 2017-09-13 VITALS
TEMPERATURE: 97.6 F | HEART RATE: 60 BPM | HEIGHT: 67 IN | BODY MASS INDEX: 42.06 KG/M2 | OXYGEN SATURATION: 95 % | DIASTOLIC BLOOD PRESSURE: 88 MMHG | WEIGHT: 268 LBS | SYSTOLIC BLOOD PRESSURE: 126 MMHG | RESPIRATION RATE: 16 BRPM

## 2017-09-13 DIAGNOSIS — I27.20 PULMONARY HYPERTENSION (HCC): ICD-10-CM

## 2017-09-13 DIAGNOSIS — J45.20 MILD INTERMITTENT ASTHMA WITHOUT COMPLICATION: ICD-10-CM

## 2017-09-13 PROCEDURE — 99214 OFFICE O/P EST MOD 30 MIN: CPT | Performed by: FAMILY MEDICINE

## 2017-09-13 RX ORDER — LEVONORGESTREL AND ETHINYL ESTRADIOL 6-5-10
1 KIT ORAL DAILY
Refills: 3 | COMMUNITY
Start: 2017-09-03 | End: 2017-10-30

## 2017-09-13 NOTE — PROGRESS NOTES
Chief Complaint   Patient presents with   • Hospital Follow-up       HISTORY OF PRESENT ILLNESS: Patient is a 37 y.o. female established patient here today for the following concerns:    1. Mild intermittent asthma without complication  2. Pulmonary hypertension (CMS-HCC)  Here today for hospital follow up for SOB and palpitations associated with nausea and vomiting.  She reports that she had several days prior.  She tried taking her xanax initially for panic attack but did not help.  She was admitted and underwent CXR, CTA, EKG, Trop, and echo and was discharged home with qvar and albuterol and referred to pulmonology.  Her trop was mildly elevated and Echo demonstrated elevated right heart pressures.  She was found to have nocturnal hypoxia and discharged home with O2.  She reports that she has not used it and has found sleep propped up was more helpful.  She does not feel any different using the qvar and denies cough.  Has not had to use the albuterol rescue.       Past Medical, Social, and Family history reviewed and updated in EPIC    Allergies:Erythromycin and Pcn [penicillins]    Current Outpatient Prescriptions   Medication Sig Dispense Refill   • TRIVORA, 28, per tablet Take 1 Tab by mouth every day.  3   • albuterol 108 (90 Base) MCG/ACT Aero Soln inhalation aerosol Inhale 2 Puffs by mouth every four hours as needed for Shortness of Breath. 1 Inhaler 3   • beclomethasone (QVAR) 40 MCG/ACT inhaler Inhale 1 Puff by mouth 2 Times a Day. 1 Inhaler 1   • fluoxetine (PROZAC) 40 MG capsule Take 80 mg by mouth every day.     • alprazolam (XANAX) 0.5 MG Tab Take 0.5 mg by mouth 1 time daily as needed for Anxiety.       No current facility-administered medications for this visit.          ROS:  Review of Systems   Constitutional: Negative for fever, chills, weight loss and malaise/fatigue.   HENT: Negative for ear pain, nosebleeds, congestion, sore throat and neck pain.    Eyes: Negative for blurred vision.  "  Respiratory: Negative for cough, sputum production, +shortness of breath and no wheezing.    Cardiovascular: Negative for chest pain, palpitations,  and leg swelling.   Gastrointestinal: Negative for heartburn, nausea, vomiting, diarrhea and abdominal pain.   Genitourinary: Negative for dysuria, urgency and frequency.   Musculoskeletal: Negative for myalgias, back pain and joint pain.   Skin: Negative for rash and itching.   Neurological: Negative for dizziness, tingling, tremors, sensory change, focal weakness and headaches.   Endo/Heme/Allergies: Does not bruise/bleed easily.   Psychiatric/Behavioral: Negative for depression, anxiety, suicidal ideas, insomnia and memory loss.      Exam:  Blood pressure 126/88, pulse 60, temperature 36.4 °C (97.6 °F), resp. rate 16, height 1.702 m (5' 7.01\"), weight 121.6 kg (268 lb), last menstrual period 09/13/2017, SpO2 95 %.    General:  Well nourished, well developed in NAD  Head is grossly normal.  Neck: Supple without JVD   Pulmonary:  Normal effort. CTAB no w/r/c  Cardiovascular: Regular rate and rhythm no m/r/g  Extremities: no clubbing, cyanosis, or edema.  Psych: affect appropriate      Please note that this dictation was created using voice recognition software. I have made every reasonable attempt to correct obvious errors, but I expect that there are errors of grammar and possibly content that I did not discover before finalizing the note.    Assessment/Plan:  1. Mild intermittent asthma without complication  - PULMONARY FUNCTION TESTS Test requested: Complete Pulmonary Function Test  - may hold qvar until PFTs    2. Pulmonary hypertension (CMS-[facility])  Check apnea link and OPO, will determine if continued O2 and possible positive pressure may be needed.  Keep consultation for November.  - PULMONARY FUNCTION TESTS Test requested: Complete Pulmonary Function Test    1 month follow up        "

## 2017-09-15 NOTE — ADDENDUM NOTE
Encounter addended by: Chastity Newman on: 9/15/2017 10:33 AM<BR>    Actions taken: Flowsheet accepted

## 2017-10-04 ENCOUNTER — SLEEP CENTER VISIT (OUTPATIENT)
Dept: SLEEP MEDICINE | Facility: MEDICAL CENTER | Age: 37
End: 2017-10-04
Payer: COMMERCIAL

## 2017-10-04 VITALS
DIASTOLIC BLOOD PRESSURE: 82 MMHG | BODY MASS INDEX: 41.75 KG/M2 | SYSTOLIC BLOOD PRESSURE: 120 MMHG | HEIGHT: 67 IN | HEART RATE: 55 BPM | OXYGEN SATURATION: 97 % | RESPIRATION RATE: 16 BRPM | WEIGHT: 266 LBS

## 2017-10-04 DIAGNOSIS — G47.33 OBSTRUCTIVE SLEEP APNEA: ICD-10-CM

## 2017-10-04 DIAGNOSIS — I27.20 PULMONARY HYPERTENSION (HCC): ICD-10-CM

## 2017-10-04 PROCEDURE — 99204 OFFICE O/P NEW MOD 45 MIN: CPT | Performed by: INTERNAL MEDICINE

## 2017-10-04 NOTE — PATIENT INSTRUCTIONS
1. We we have ordered a sleep study  2. We have ordered pulmonary function testing  3. We have ordered blood work  4. Recommend follow-up after the sleep study

## 2017-10-04 NOTE — PROGRESS NOTES
Modesta Archuleta is a 37 y.o. female here for obstructive sleep apnea.  Patient was referred by Dr. Quynh Joel.    History of Present Illness:    Patient is a 37-year-old female who has exertional dyspnea. She was admitted to the hospital and had an echocardiogram which showed normal LV function but the right ventricular systolic pressure was 50 mmHg. She also had a CT scan of the chest which was negative for pulmonary embolism. It was felt that perhaps her elevated right heart pressures were related to sleep apnea and the patient was referred to this office. She does have a family history of sleep apnea. Her mom has sleep apnea and has a CPAP machine. The patient does snore and her boyfriend says that she stops breathing in her sleep. The patient is a bit at 9:00 at night and will fall sleep within 20-30 minutes. She will wake up at 4:30 in the morning feeling tired. She does admit that she has a 10-month-old baby at home and that has disrupted her sleep significantly. She denies nocturnal gasping or choking. She does wake up with a sore throat and dry throat. She will wake up feeling hot and sweaty but she denies waking up with headaches. She denies any nocturia. During the daytime she works as a  and does feel very sleepy but she is unable to take naps at work. On the weekends she does try to catch up with her sleep in nap. She does kick in her sleep. She denies restless legs. She denies physically acting out her dreams or seizures or sleepwalking. She is a nonsmoker. She has no history of asthma. She denies a history of connective tissue disease. She does not take any diet medications. She's had she has had some weight gain.    Constitutional:  Negative for fever, chills, sweats, and fatigue.  Eyes:  Negative for eye pain and visual changes.  HENT:  Negative for tinnitus and hoarse voice.  Cardiovascular:  Negative for chest pain, leg swelling, syncope and orthopnea.  Respiratory:  See HPI for  "pertinent negatives  Sleep: Positive for somnolence, loud snoring, sleep disturbance due to breathing, insomnia.  Gastrointestinal:  Negative for dysphagia, nausea and abdominal pain.  Heme/lymph:  Denies easy bruising, blood clots.  Musculoskeletal:  Negative for arthralgias, sore muscles and back pain.  Skin:  Negative for rash and color change.  Neurological:  Negative for headaches, lightheadedness and weakness.  Psychiatric:  Denies depression.    Current Outpatient Prescriptions   Medication Sig Dispense Refill   • TRIVORA, 28, per tablet Take 1 Tab by mouth every day.  3   • fluoxetine (PROZAC) 40 MG capsule Take 80 mg by mouth every day.     • alprazolam (XANAX) 0.5 MG Tab Take 0.5 mg by mouth 1 time daily as needed for Anxiety.     • albuterol 108 (90 Base) MCG/ACT Aero Soln inhalation aerosol Inhale 2 Puffs by mouth every four hours as needed for Shortness of Breath. 1 Inhaler 3   • beclomethasone (QVAR) 40 MCG/ACT inhaler Inhale 1 Puff by mouth 2 Times a Day. 1 Inhaler 1     No current facility-administered medications for this visit.        Social History   Substance Use Topics   • Smoking status: Never Smoker   • Smokeless tobacco: Never Used   • Alcohol use Yes      Comment: once every 3 weeks       Past Medical History:   Diagnosis Date   • Ocular migraine 2/12/2015   • Anxiety and depression 2/12/2015   • Chronic diarrhea 2/12/2015   • Chickenpox        Past Surgical History:   Procedure Laterality Date   • PRIMARY C SECTION  11/13/2016    Procedure: PRIMARY C SECTION;  Surgeon: Delphine Carbone M.D.;  Location: LABOR AND DELIVERY;  Service:    • ARTHROSCOPY, KNEE     • TONSILLECTOMY         Allergies:  Erythromycin and Pcn [penicillins]    Family History   Problem Relation Age of Onset   • Sleep Apnea Mother    • Hypertension Father    • Heart Disease Father    • DVT Father        Physical Examination    Vitals:    10/04/17 0904   Height: 1.702 m (5' 7\")   Weight: 120.7 kg (266 lb)   Weight % change " since last entry.: 0 %   BP: 120/82   Pulse: (!) 55   BMI (Calculated): 41.66   Resp: 16   O2 sat % room air: 97 %       Physical Exam:  Constitutional:  Well developed and well nourished.  Head:  Normocephalic and atraumatic.  Nose:  Nose normal.  Mouth/Throat:  Oropharynx is clear and moist, no lesions.Mallampati class IV   Eyes:  Conjunctivae and EOM are normal.  Pupils are equal, round, and reactive to light.  Neck:  Normal range of motion.  Supple.  No JVD. No tracheal deviation.  No thyromegally  Cardiovascular:  Normal rate, regular rhythm, normal heart sounds and intact distal pulses.  Pulmonary/Chest:  No accessory muscle use.  No wheezing, rales or rhonchi.  No dullness to percussion, tenderness or deformity.  Abdominal:  Soft.  No ascites.  No Hepatosplenomegally.  Non tender.  Musculoskeletal.  Normal range of motion.  No muscular atrophy.  Lymphadenopathy:  No cervical or supraclavicular adenopathy  Neurological:  Alert and oriented.  Cranial nerves intact.  No focal deficits  Skin:  No rashes or ulcers.  Psyciatric:  Normal mood and affect.    Assessment and Plan:  1. Obstructive sleep apnea  The patient has snoring, witnessed apneas, unrefreshing sleep and daytime hypersomnolence. She has a crowded oropharynx. There is evidence of pulmonary hypertension. She has an elevated BMI and there is a family history of sleep apnea. I recommended a sleep study and we'll see her back when this is been completed.  - POLYSOMNOGRAPHY, 4 OR MORE; Future    2. Pulmonary hypertension  The patient has pulmonary hypertension with an RVSP of 50 mmHg associated with exertional dyspnea. Although sleep apnea is a known cause of pulmonary hypertension must consider the possibility of secondary causes of pulmonary hypertension other than sleep apnea as well as primary pulmonary hypertension. She had a CT scan of the chest which was negative for pulmonary embolism. I have ordered pulmonary function testing as well as some  serologies for connective tissue disease. If the sleep study shows severe sleep apnea with severe oxygen desaturation and then perhaps the pulmonary hypertension can be explained by sleep apnea. However if the sleep apnea is mild then recommend further evaluation and workup for pulmonary hypertension such as a right heart catheterization.  - AMB PULMONARY FUNCTION TEST/LAB; Future  - ANGIOTENSIN I CONVERTING ENZYME; Future  - ANTI-NUCLEAR ANTIBODY SERUM; Future  - RHEUMATOID ARTHRITIS FACTOR; Future  - SYSTEMIC SCLEROSIS PANEL; Future  - WESTERGREN SED RATE; Future          Followup Return in about 6 weeks (around 11/15/2017) for follow up visit with Burton Blackwell MD.

## 2017-10-12 ENCOUNTER — HOSPITAL ENCOUNTER (OUTPATIENT)
Dept: LAB | Facility: MEDICAL CENTER | Age: 37
End: 2017-10-12
Attending: PEDIATRICS
Payer: COMMERCIAL

## 2017-10-12 DIAGNOSIS — I27.20 PULMONARY HYPERTENSION (HCC): ICD-10-CM

## 2017-10-12 LAB
ERYTHROCYTE [SEDIMENTATION RATE] IN BLOOD BY WESTERGREN METHOD: 16 MM/HOUR (ref 0–20)
RHEUMATOID FACT SER IA-ACNC: <10 IU/ML (ref 0–14)

## 2017-10-12 PROCEDURE — 36415 COLL VENOUS BLD VENIPUNCTURE: CPT

## 2017-10-12 PROCEDURE — 85652 RBC SED RATE AUTOMATED: CPT

## 2017-10-12 PROCEDURE — 86235 NUCLEAR ANTIGEN ANTIBODY: CPT

## 2017-10-12 PROCEDURE — 82164 ANGIOTENSIN I ENZYME TEST: CPT

## 2017-10-12 PROCEDURE — 83516 IMMUNOASSAY NONANTIBODY: CPT

## 2017-10-12 PROCEDURE — 86038 ANTINUCLEAR ANTIBODIES: CPT

## 2017-10-12 PROCEDURE — 86431 RHEUMATOID FACTOR QUANT: CPT

## 2017-10-12 PROCEDURE — 86039 ANTINUCLEAR ANTIBODIES (ANA): CPT

## 2017-10-14 LAB — ACE SERPL-CCNC: 20 U/L (ref 9–67)

## 2017-10-16 LAB
ENA SCL70 IGG SER QL: 4 AU/ML (ref 0–40)
NUCLEAR IGG SER QL IA: ABNORMAL
NUCLEAR IGG TITR SER IF: ABNORMAL {TITER}
RNAP III AB SER IA-ACNC: 11 UNITS (ref 0–19)

## 2017-10-18 ENCOUNTER — NON-PROVIDER VISIT (OUTPATIENT)
Dept: PULMONOLOGY | Facility: HOSPICE | Age: 37
End: 2017-10-18
Payer: COMMERCIAL

## 2017-10-18 VITALS — HEIGHT: 67 IN | WEIGHT: 265 LBS | BODY MASS INDEX: 41.59 KG/M2

## 2017-10-18 DIAGNOSIS — I27.20 PULMONARY HYPERTENSION (HCC): ICD-10-CM

## 2017-10-18 PROCEDURE — 94726 PLETHYSMOGRAPHY LUNG VOLUMES: CPT | Performed by: INTERNAL MEDICINE

## 2017-10-18 PROCEDURE — 94729 DIFFUSING CAPACITY: CPT | Performed by: INTERNAL MEDICINE

## 2017-10-18 PROCEDURE — 94060 EVALUATION OF WHEEZING: CPT | Performed by: INTERNAL MEDICINE

## 2017-10-18 ASSESSMENT — PULMONARY FUNCTION TESTS
FVC_PERCENT_PREDICTED: 80
FEV1/FVC: 86
FEV1/FVC_PERCENT_PREDICTED: 82
FEV1: 2.84
FEV1/FVC_PERCENT_PREDICTED: 104
FEV1_PERCENT_CHANGE: 10
FEV1/FVC: 86.98
FEV1/FVC_PERCENT_CHANGE: 111
FEV1_PERCENT_CHANGE: 9
FEV1_PREDICTED: 3.39
FEV1: 3.14
FVC: 3.61
FVC_PREDICTED: 4.12
FEV1_PERCENT_PREDICTED: 92
FEV1_PERCENT_PREDICTED: 83
FEV1/FVC_PERCENT_PREDICTED: 106
FVC: 3.3
FVC_PERCENT_PREDICTED: 87

## 2017-10-18 NOTE — PROCEDURES
Technician: TYRONE Aguilar    Technician Comment:  Good patient effort & cooperation.  The results of this test meet the ATS/ERS standards for acceptability & reproducibility.  Test was performed on the ROXIMITY Body Plethysmograph-Elite DX system.  Predicted values were [facility]-3 for spirometry, [facility] for DLCO, ITS for Lung Volumes.  The DLCO was uncorrected for Hgb.  A bronchodilator of Ventolin HFA -2puffs via spacer administered.    Interpretation:    One function testing done October 18, 2017 showed mild reduction of FEV1 at 83% predicted, 282.84 liters. Lung volumes show mild reduction in expiratory reserve volume which could be related to elevated BMI. Oxygen transfer is normal. Bronchodilator response was seen. Good effort noted

## 2017-10-30 ENCOUNTER — OFFICE VISIT (OUTPATIENT)
Dept: URGENT CARE | Facility: PHYSICIAN GROUP | Age: 37
End: 2017-10-30
Payer: COMMERCIAL

## 2017-10-30 ENCOUNTER — HOSPITAL ENCOUNTER (INPATIENT)
Facility: MEDICAL CENTER | Age: 37
LOS: 2 days | DRG: 287 | End: 2017-11-01
Attending: EMERGENCY MEDICINE | Admitting: HOSPITALIST
Payer: COMMERCIAL

## 2017-10-30 ENCOUNTER — APPOINTMENT (OUTPATIENT)
Dept: RADIOLOGY | Facility: MEDICAL CENTER | Age: 37
DRG: 287 | End: 2017-10-30
Attending: EMERGENCY MEDICINE
Payer: COMMERCIAL

## 2017-10-30 ENCOUNTER — HOSPITAL ENCOUNTER (OUTPATIENT)
Dept: RADIOLOGY | Facility: MEDICAL CENTER | Age: 37
End: 2017-10-30
Attending: FAMILY MEDICINE | Admitting: HOSPITALIST
Payer: COMMERCIAL

## 2017-10-30 ENCOUNTER — APPOINTMENT (OUTPATIENT)
Dept: RADIOLOGY | Facility: MEDICAL CENTER | Age: 37
DRG: 287 | End: 2017-10-30
Attending: INTERNAL MEDICINE
Payer: COMMERCIAL

## 2017-10-30 ENCOUNTER — RESOLUTE PROFESSIONAL BILLING HOSPITAL PROF FEE (OUTPATIENT)
Dept: HOSPITALIST | Facility: MEDICAL CENTER | Age: 37
End: 2017-10-30
Payer: COMMERCIAL

## 2017-10-30 DIAGNOSIS — R00.0 TACHYCARDIA: ICD-10-CM

## 2017-10-30 DIAGNOSIS — R79.89 ELEVATED TROPONIN: ICD-10-CM

## 2017-10-30 DIAGNOSIS — R06.02 SHORTNESS OF BREATH: ICD-10-CM

## 2017-10-30 DIAGNOSIS — R05.9 COUGH: ICD-10-CM

## 2017-10-30 DIAGNOSIS — R06.02 SOB (SHORTNESS OF BREATH): ICD-10-CM

## 2017-10-30 PROBLEM — R65.10 SIRS (SYSTEMIC INFLAMMATORY RESPONSE SYNDROME) (HCC): Status: ACTIVE | Noted: 2017-10-30

## 2017-10-30 LAB
ALBUMIN SERPL BCP-MCNC: 3.8 G/DL (ref 3.2–4.9)
ALBUMIN/GLOB SERPL: 1.2 G/DL
ALP SERPL-CCNC: 96 U/L (ref 30–99)
ALT SERPL-CCNC: 16 U/L (ref 2–50)
ANION GAP SERPL CALC-SCNC: 14 MMOL/L (ref 0–11.9)
ANION GAP SERPL CALC-SCNC: 9 MMOL/L (ref 0–11.9)
APTT PPP: 23.8 SEC (ref 24.7–36)
AST SERPL-CCNC: 14 U/L (ref 12–45)
BASOPHILS # BLD AUTO: 0.3 % (ref 0–1.8)
BASOPHILS # BLD: 0.06 K/UL (ref 0–0.12)
BILIRUB SERPL-MCNC: 0.9 MG/DL (ref 0.1–1.5)
BNP SERPL-MCNC: 209 PG/ML (ref 0–100)
BUN SERPL-MCNC: 6 MG/DL (ref 8–22)
BUN SERPL-MCNC: 6 MG/DL (ref 8–22)
CALCIUM SERPL-MCNC: 8.5 MG/DL (ref 8.5–10.5)
CALCIUM SERPL-MCNC: 8.8 MG/DL (ref 8.5–10.5)
CHLORIDE SERPL-SCNC: 105 MMOL/L (ref 96–112)
CHLORIDE SERPL-SCNC: 105 MMOL/L (ref 96–112)
CO2 SERPL-SCNC: 18 MMOL/L (ref 20–33)
CO2 SERPL-SCNC: 25 MMOL/L (ref 20–33)
CREAT SERPL-MCNC: 0.68 MG/DL (ref 0.5–1.4)
CREAT SERPL-MCNC: 0.71 MG/DL (ref 0.5–1.4)
EKG IMPRESSION: NORMAL
EKG IMPRESSION: NORMAL
EOSINOPHIL # BLD AUTO: 0.04 K/UL (ref 0–0.51)
EOSINOPHIL NFR BLD: 0.2 % (ref 0–6.9)
ERYTHROCYTE [DISTWIDTH] IN BLOOD BY AUTOMATED COUNT: 43.2 FL (ref 35.9–50)
ERYTHROCYTE [DISTWIDTH] IN BLOOD BY AUTOMATED COUNT: 43.9 FL (ref 35.9–50)
GFR SERPL CREATININE-BSD FRML MDRD: >60 ML/MIN/1.73 M 2
GFR SERPL CREATININE-BSD FRML MDRD: >60 ML/MIN/1.73 M 2
GLOBULIN SER CALC-MCNC: 3.2 G/DL (ref 1.9–3.5)
GLUCOSE SERPL-MCNC: 106 MG/DL (ref 65–99)
GLUCOSE SERPL-MCNC: 118 MG/DL (ref 65–99)
HCT VFR BLD AUTO: 37.3 % (ref 37–47)
HCT VFR BLD AUTO: 40.7 % (ref 37–47)
HGB BLD-MCNC: 12.3 G/DL (ref 12–16)
HGB BLD-MCNC: 13.6 G/DL (ref 12–16)
IMM GRANULOCYTES # BLD AUTO: 0.21 K/UL (ref 0–0.11)
IMM GRANULOCYTES NFR BLD AUTO: 1.2 % (ref 0–0.9)
INR PPP: 0.98 (ref 0.87–1.13)
INR PPP: 1.05 (ref 0.87–1.13)
LACTATE BLD-SCNC: 1.7 MMOL/L (ref 0.5–2)
LIPASE SERPL-CCNC: 3 U/L (ref 11–82)
LYMPHOCYTES # BLD AUTO: 1.38 K/UL (ref 1–4.8)
LYMPHOCYTES NFR BLD: 7.7 % (ref 22–41)
MCH RBC QN AUTO: 29.6 PG (ref 27–33)
MCH RBC QN AUTO: 29.8 PG (ref 27–33)
MCHC RBC AUTO-ENTMCNC: 33 G/DL (ref 33.6–35)
MCHC RBC AUTO-ENTMCNC: 33.4 G/DL (ref 33.6–35)
MCV RBC AUTO: 89.3 FL (ref 81.4–97.8)
MCV RBC AUTO: 89.9 FL (ref 81.4–97.8)
MONOCYTES # BLD AUTO: 0.72 K/UL (ref 0–0.85)
MONOCYTES NFR BLD AUTO: 4 % (ref 0–13.4)
NEUTROPHILS # BLD AUTO: 15.59 K/UL (ref 2–7.15)
NEUTROPHILS NFR BLD: 86.6 % (ref 44–72)
NRBC # BLD AUTO: 0 K/UL
NRBC BLD AUTO-RTO: 0 /100 WBC
PLATELET # BLD AUTO: 262 K/UL (ref 164–446)
PLATELET # BLD AUTO: 267 K/UL (ref 164–446)
PMV BLD AUTO: 10.1 FL (ref 9–12.9)
PMV BLD AUTO: 10.2 FL (ref 9–12.9)
POTASSIUM SERPL-SCNC: 3.7 MMOL/L (ref 3.6–5.5)
POTASSIUM SERPL-SCNC: 3.7 MMOL/L (ref 3.6–5.5)
PROT SERPL-MCNC: 7 G/DL (ref 6–8.2)
PROTHROMBIN TIME: 12.7 SEC (ref 12–14.6)
PROTHROMBIN TIME: 13.4 SEC (ref 12–14.6)
RBC # BLD AUTO: 4.15 M/UL (ref 4.2–5.4)
RBC # BLD AUTO: 4.56 M/UL (ref 4.2–5.4)
SODIUM SERPL-SCNC: 137 MMOL/L (ref 135–145)
SODIUM SERPL-SCNC: 139 MMOL/L (ref 135–145)
TROPONIN I SERPL-MCNC: 0.95 NG/ML (ref 0–0.04)
TROPONIN I SERPL-MCNC: 1.53 NG/ML (ref 0–0.04)
TROPONIN I SERPL-MCNC: 1.54 NG/ML (ref 0–0.04)
TROPONIN I SERPL-MCNC: 1.55 NG/ML (ref 0–0.04)
TSH SERPL DL<=0.005 MIU/L-ACNC: 1.61 UIU/ML (ref 0.3–3.7)
WBC # BLD AUTO: 16.9 K/UL (ref 4.8–10.8)
WBC # BLD AUTO: 18 K/UL (ref 4.8–10.8)

## 2017-10-30 PROCEDURE — 93005 ELECTROCARDIOGRAM TRACING: CPT

## 2017-10-30 PROCEDURE — 96365 THER/PROPH/DIAG IV INF INIT: CPT

## 2017-10-30 PROCEDURE — 85025 COMPLETE CBC W/AUTO DIFF WBC: CPT

## 2017-10-30 PROCEDURE — 36415 COLL VENOUS BLD VENIPUNCTURE: CPT

## 2017-10-30 PROCEDURE — 304562 HCHG STAT O2 MASK/CANNULA

## 2017-10-30 PROCEDURE — 71010 DX-CHEST-LIMITED (1 VIEW): CPT

## 2017-10-30 PROCEDURE — 700102 HCHG RX REV CODE 250 W/ 637 OVERRIDE(OP): Performed by: EMERGENCY MEDICINE

## 2017-10-30 PROCEDURE — 71020 DX-CHEST-2 VIEWS: CPT

## 2017-10-30 PROCEDURE — 700105 HCHG RX REV CODE 258: Performed by: HOSPITALIST

## 2017-10-30 PROCEDURE — 85027 COMPLETE CBC AUTOMATED: CPT

## 2017-10-30 PROCEDURE — 700111 HCHG RX REV CODE 636 W/ 250 OVERRIDE (IP): Performed by: HOSPITALIST

## 2017-10-30 PROCEDURE — 85730 THROMBOPLASTIN TIME PARTIAL: CPT

## 2017-10-30 PROCEDURE — 304561 HCHG STAT O2

## 2017-10-30 PROCEDURE — 93005 ELECTROCARDIOGRAM TRACING: CPT | Performed by: EMERGENCY MEDICINE

## 2017-10-30 PROCEDURE — 83690 ASSAY OF LIPASE: CPT

## 2017-10-30 PROCEDURE — 83880 ASSAY OF NATRIURETIC PEPTIDE: CPT

## 2017-10-30 PROCEDURE — A9270 NON-COVERED ITEM OR SERVICE: HCPCS | Performed by: EMERGENCY MEDICINE

## 2017-10-30 PROCEDURE — 84443 ASSAY THYROID STIM HORMONE: CPT

## 2017-10-30 PROCEDURE — 99214 OFFICE O/P EST MOD 30 MIN: CPT | Performed by: FAMILY MEDICINE

## 2017-10-30 PROCEDURE — 770020 HCHG ROOM/CARE - TELE (206)

## 2017-10-30 PROCEDURE — 700111 HCHG RX REV CODE 636 W/ 250 OVERRIDE (IP): Performed by: EMERGENCY MEDICINE

## 2017-10-30 PROCEDURE — 83605 ASSAY OF LACTIC ACID: CPT

## 2017-10-30 PROCEDURE — A9270 NON-COVERED ITEM OR SERVICE: HCPCS | Performed by: HOSPITALIST

## 2017-10-30 PROCEDURE — 93005 ELECTROCARDIOGRAM TRACING: CPT | Performed by: HOSPITALIST

## 2017-10-30 PROCEDURE — 99285 EMERGENCY DEPT VISIT HI MDM: CPT

## 2017-10-30 PROCEDURE — 700117 HCHG RX CONTRAST REV CODE 255: Performed by: EMERGENCY MEDICINE

## 2017-10-30 PROCEDURE — 80053 COMPREHEN METABOLIC PANEL: CPT

## 2017-10-30 PROCEDURE — 80048 BASIC METABOLIC PNL TOTAL CA: CPT

## 2017-10-30 PROCEDURE — 71275 CT ANGIOGRAPHY CHEST: CPT

## 2017-10-30 PROCEDURE — 700102 HCHG RX REV CODE 250 W/ 637 OVERRIDE(OP): Performed by: HOSPITALIST

## 2017-10-30 PROCEDURE — 85610 PROTHROMBIN TIME: CPT

## 2017-10-30 PROCEDURE — 84484 ASSAY OF TROPONIN QUANT: CPT

## 2017-10-30 PROCEDURE — 94760 N-INVAS EAR/PLS OXIMETRY 1: CPT | Performed by: FAMILY MEDICINE

## 2017-10-30 PROCEDURE — 96375 TX/PRO/DX INJ NEW DRUG ADDON: CPT

## 2017-10-30 PROCEDURE — 99223 1ST HOSP IP/OBS HIGH 75: CPT | Performed by: HOSPITALIST

## 2017-10-30 PROCEDURE — 93010 ELECTROCARDIOGRAM REPORT: CPT | Performed by: INTERNAL MEDICINE

## 2017-10-30 RX ORDER — ALBUTEROL SULFATE 90 UG/1
2 AEROSOL, METERED RESPIRATORY (INHALATION) EVERY 4 HOURS PRN
Status: DISCONTINUED | OUTPATIENT
Start: 2017-10-30 | End: 2017-11-01 | Stop reason: HOSPADM

## 2017-10-30 RX ORDER — AZITHROMYCIN 500 MG/1
500 INJECTION, POWDER, LYOPHILIZED, FOR SOLUTION INTRAVENOUS ONCE
Status: DISCONTINUED | OUTPATIENT
Start: 2017-10-30 | End: 2017-10-30

## 2017-10-30 RX ORDER — ENALAPRILAT 1.25 MG/ML
1.25 INJECTION INTRAVENOUS EVERY 6 HOURS PRN
Status: DISCONTINUED | OUTPATIENT
Start: 2017-10-30 | End: 2017-11-01 | Stop reason: HOSPADM

## 2017-10-30 RX ORDER — NITROGLYCERIN 0.4 MG/1
0.4 TABLET SUBLINGUAL
Status: DISCONTINUED | OUTPATIENT
Start: 2017-10-30 | End: 2017-11-01 | Stop reason: HOSPADM

## 2017-10-30 RX ORDER — CEFTRIAXONE 2 G/1
2 INJECTION, POWDER, FOR SOLUTION INTRAMUSCULAR; INTRAVENOUS ONCE
Status: COMPLETED | OUTPATIENT
Start: 2017-10-30 | End: 2017-10-30

## 2017-10-30 RX ORDER — SODIUM CHLORIDE 9 MG/ML
INJECTION, SOLUTION INTRAVENOUS CONTINUOUS
Status: DISCONTINUED | OUTPATIENT
Start: 2017-10-30 | End: 2017-11-01

## 2017-10-30 RX ORDER — ASPIRIN 81 MG/1
324 TABLET, CHEWABLE ORAL DAILY
Status: DISCONTINUED | OUTPATIENT
Start: 2017-10-30 | End: 2017-11-01

## 2017-10-30 RX ORDER — ALPRAZOLAM 0.25 MG/1
0.5 TABLET ORAL
Status: DISCONTINUED | OUTPATIENT
Start: 2017-10-30 | End: 2017-10-30

## 2017-10-30 RX ORDER — PROMETHAZINE HYDROCHLORIDE 25 MG/1
12.5-25 TABLET ORAL EVERY 4 HOURS PRN
Status: DISCONTINUED | OUTPATIENT
Start: 2017-10-30 | End: 2017-11-01 | Stop reason: HOSPADM

## 2017-10-30 RX ORDER — ASPIRIN 300 MG/1
300 SUPPOSITORY RECTAL DAILY
Status: DISCONTINUED | OUTPATIENT
Start: 2017-10-30 | End: 2017-11-01

## 2017-10-30 RX ORDER — AMOXICILLIN 250 MG
2 CAPSULE ORAL 2 TIMES DAILY
Status: DISCONTINUED | OUTPATIENT
Start: 2017-10-30 | End: 2017-11-01 | Stop reason: HOSPADM

## 2017-10-30 RX ORDER — ONDANSETRON 2 MG/ML
4 INJECTION INTRAMUSCULAR; INTRAVENOUS EVERY 4 HOURS PRN
Status: DISCONTINUED | OUTPATIENT
Start: 2017-10-30 | End: 2017-11-01 | Stop reason: HOSPADM

## 2017-10-30 RX ORDER — BISACODYL 10 MG
10 SUPPOSITORY, RECTAL RECTAL
Status: DISCONTINUED | OUTPATIENT
Start: 2017-10-30 | End: 2017-11-01 | Stop reason: HOSPADM

## 2017-10-30 RX ORDER — FLUOXETINE HYDROCHLORIDE 20 MG/1
80 CAPSULE ORAL DAILY
Status: DISCONTINUED | OUTPATIENT
Start: 2017-10-31 | End: 2017-11-01 | Stop reason: HOSPADM

## 2017-10-30 RX ORDER — SODIUM CHLORIDE 9 MG/ML
INJECTION, SOLUTION INTRAVENOUS
Status: DISPENSED
Start: 2017-10-30 | End: 2017-10-31

## 2017-10-30 RX ORDER — ACETAMINOPHEN 325 MG/1
650 TABLET ORAL ONCE
Status: COMPLETED | OUTPATIENT
Start: 2017-10-30 | End: 2017-10-30

## 2017-10-30 RX ORDER — ATORVASTATIN CALCIUM 40 MG/1
40 TABLET, FILM COATED ORAL EVERY EVENING
Status: DISCONTINUED | OUTPATIENT
Start: 2017-10-30 | End: 2017-11-01 | Stop reason: HOSPADM

## 2017-10-30 RX ORDER — POLYETHYLENE GLYCOL 3350 17 G/17G
1 POWDER, FOR SOLUTION ORAL
Status: DISCONTINUED | OUTPATIENT
Start: 2017-10-30 | End: 2017-11-01 | Stop reason: HOSPADM

## 2017-10-30 RX ORDER — LORAZEPAM 1 MG/1
0.5 TABLET ORAL EVERY 6 HOURS PRN
Status: DISCONTINUED | OUTPATIENT
Start: 2017-10-30 | End: 2017-11-01 | Stop reason: HOSPADM

## 2017-10-30 RX ORDER — PROMETHAZINE HYDROCHLORIDE 25 MG/1
12.5-25 SUPPOSITORY RECTAL EVERY 4 HOURS PRN
Status: DISCONTINUED | OUTPATIENT
Start: 2017-10-30 | End: 2017-11-01 | Stop reason: HOSPADM

## 2017-10-30 RX ORDER — ACETAMINOPHEN 325 MG/1
650 TABLET ORAL EVERY 6 HOURS PRN
Status: DISCONTINUED | OUTPATIENT
Start: 2017-10-30 | End: 2017-11-01 | Stop reason: HOSPADM

## 2017-10-30 RX ORDER — ONDANSETRON 4 MG/1
4 TABLET, ORALLY DISINTEGRATING ORAL EVERY 4 HOURS PRN
Status: DISCONTINUED | OUTPATIENT
Start: 2017-10-30 | End: 2017-11-01 | Stop reason: HOSPADM

## 2017-10-30 RX ORDER — ASPIRIN 325 MG
325 TABLET ORAL DAILY
Status: DISCONTINUED | OUTPATIENT
Start: 2017-10-30 | End: 2017-11-01

## 2017-10-30 RX ORDER — LORAZEPAM 2 MG/ML
0.5 INJECTION INTRAMUSCULAR EVERY 6 HOURS PRN
Status: DISCONTINUED | OUTPATIENT
Start: 2017-10-30 | End: 2017-11-01 | Stop reason: HOSPADM

## 2017-10-30 RX ADMIN — ACETAMINOPHEN 650 MG: 325 TABLET, FILM COATED ORAL at 18:15

## 2017-10-30 RX ADMIN — SODIUM CHLORIDE: 9 INJECTION, SOLUTION INTRAVENOUS at 23:17

## 2017-10-30 RX ADMIN — CEFTRIAXONE SODIUM 2 G: 2 INJECTION, POWDER, FOR SOLUTION INTRAMUSCULAR; INTRAVENOUS at 20:06

## 2017-10-30 RX ADMIN — ATORVASTATIN CALCIUM 40 MG: 40 TABLET, FILM COATED ORAL at 21:32

## 2017-10-30 RX ADMIN — ONDANSETRON 4 MG: 2 INJECTION, SOLUTION INTRAMUSCULAR; INTRAVENOUS at 20:14

## 2017-10-30 RX ADMIN — IOHEXOL 75 ML: 350 INJECTION, SOLUTION INTRAVENOUS at 19:15

## 2017-10-30 RX ADMIN — METOPROLOL TARTRATE 25 MG: 25 TABLET ORAL at 21:32

## 2017-10-30 RX ADMIN — ACETAMINOPHEN 650 MG: 325 TABLET, FILM COATED ORAL at 23:21

## 2017-10-30 RX ADMIN — ASPIRIN 325 MG: 325 TABLET, COATED ORAL at 21:32

## 2017-10-30 ASSESSMENT — PATIENT HEALTH QUESTIONNAIRE - PHQ9
5. POOR APPETITE OR OVEREATING: NOT AT ALL
1. LITTLE INTEREST OR PLEASURE IN DOING THINGS: NOT AT ALL
8. MOVING OR SPEAKING SO SLOWLY THAT OTHER PEOPLE COULD HAVE NOTICED. OR THE OPPOSITE, BEING SO FIGETY OR RESTLESS THAT YOU HAVE BEEN MOVING AROUND A LOT MORE THAN USUAL: NOT AT ALL
SUM OF ALL RESPONSES TO PHQ9 QUESTIONS 1 AND 2: 1
SUM OF ALL RESPONSES TO PHQ QUESTIONS 1-9: 2
2. FEELING DOWN, DEPRESSED, IRRITABLE, OR HOPELESS: SEVERAL DAYS
3. TROUBLE FALLING OR STAYING ASLEEP OR SLEEPING TOO MUCH: NOT AT ALL
6. FEELING BAD ABOUT YOURSELF - OR THAT YOU ARE A FAILURE OR HAVE LET YOURSELF OR YOUR FAMILY DOWN: SEVERAL DAYS
7. TROUBLE CONCENTRATING ON THINGS, SUCH AS READING THE NEWSPAPER OR WATCHING TELEVISION: NOT AT ALL
9. THOUGHTS THAT YOU WOULD BE BETTER OFF DEAD, OR OF HURTING YOURSELF: NOT AT ALL
4. FEELING TIRED OR HAVING LITTLE ENERGY: NOT AT ALL

## 2017-10-30 ASSESSMENT — LIFESTYLE VARIABLES
CONSUMPTION TOTAL: NEGATIVE
ALCOHOL_USE: YES
ON A TYPICAL DAY WHEN YOU DRINK ALCOHOL HOW MANY DRINKS DO YOU HAVE: 3
AVERAGE NUMBER OF DAYS PER WEEK YOU HAVE A DRINK CONTAINING ALCOHOL: 2
EVER_SMOKED: NEVER
TOTAL SCORE: 0
HAVE YOU EVER FELT YOU SHOULD CUT DOWN ON YOUR DRINKING: NO
HOW MANY TIMES IN THE PAST YEAR HAVE YOU HAD 5 OR MORE DRINKS IN A DAY: 0
EVER FELT BAD OR GUILTY ABOUT YOUR DRINKING: NO
HAVE PEOPLE ANNOYED YOU BY CRITICIZING YOUR DRINKING: NO
EVER_SMOKED: NEVER
TOTAL SCORE: 0
EVER HAD A DRINK FIRST THING IN THE MORNING TO STEADY YOUR NERVES TO GET RID OF A HANGOVER: NO
TOTAL SCORE: 0

## 2017-10-30 ASSESSMENT — COPD QUESTIONNAIRES
DURING THE PAST 4 WEEKS HOW MUCH DID YOU FEEL SHORT OF BREATH: NONE/LITTLE OF THE TIME
COPD SCREENING SCORE: 1
DO YOU EVER COUGH UP ANY MUCUS OR PHLEGM?: YES, A FEW DAYS A WEEK OR MONTH
HAVE YOU SMOKED AT LEAST 100 CIGARETTES IN YOUR ENTIRE LIFE: NO/DON'T KNOW

## 2017-10-30 ASSESSMENT — ENCOUNTER SYMPTOMS
DIZZINESS: 0
VOMITING: 0
COUGH: 1
NAUSEA: 0
SHORTNESS OF BREATH: 1
HEADACHES: 0
FEVER: 0
CHILLS: 0
NECK PAIN: 0

## 2017-10-30 ASSESSMENT — PAIN SCALES - GENERAL
PAINLEVEL_OUTOF10: 0
PAINLEVEL_OUTOF10: 2
PAINLEVEL_OUTOF10: 4

## 2017-10-30 NOTE — ED NOTES
Chief Complaint   Patient presents with   • Shortness of Breath     x 2 days. had similar symptoms a month ago. worked up for PE w/negative result. was told that she might poss have pulmonary htn and sleep apnea.   • Chest Pain     left side of chest. had positive troponin a month ago.     Pt able to speak in full sentences. Respiration unlabored. States that it started w/viral syndrome then makes her symptoms worse. States that she had elevated troponin a month ago.                        ED Vitals

## 2017-10-30 NOTE — ED NOTES
Chief Complaint   Patient presents with   • Shortness of Breath     x 2 days. had similar symptoms a month ago. worked up for PE w/negative result. was told that she might poss have pulmonary htn and sleep apnea.     Pt able to speak in full sentences. Respiration unlabored. States that it started w/viral syndrome then makes her symptoms worse. States that she had elevated troponin a month ago.

## 2017-10-31 VITALS — OXYGEN SATURATION: 91 % | HEART RATE: 130 BPM

## 2017-10-31 PROBLEM — I27.20 PULMONARY HYPERTENSION (HCC): Status: ACTIVE | Noted: 2017-10-31

## 2017-10-31 PROBLEM — J40 BRONCHITIS: Status: ACTIVE | Noted: 2017-10-30

## 2017-10-31 LAB
ANION GAP SERPL CALC-SCNC: 8 MMOL/L (ref 0–11.9)
APPEARANCE UR: CLEAR
BACTERIA #/AREA URNS HPF: NEGATIVE /HPF
BASOPHILS # BLD AUTO: 0.4 % (ref 0–1.8)
BASOPHILS # BLD: 0.06 K/UL (ref 0–0.12)
BILIRUB UR QL STRIP.AUTO: ABNORMAL
BUN SERPL-MCNC: 7 MG/DL (ref 8–22)
CALCIUM SERPL-MCNC: 8.6 MG/DL (ref 8.5–10.5)
CHLORIDE SERPL-SCNC: 106 MMOL/L (ref 96–112)
CO2 SERPL-SCNC: 25 MMOL/L (ref 20–33)
COLOR UR: ABNORMAL
CREAT SERPL-MCNC: 1.03 MG/DL (ref 0.5–1.4)
EKG IMPRESSION: NORMAL
EOSINOPHIL # BLD AUTO: 0.26 K/UL (ref 0–0.51)
EOSINOPHIL NFR BLD: 1.6 % (ref 0–6.9)
EPI CELLS #/AREA URNS HPF: ABNORMAL /HPF
ERYTHROCYTE [DISTWIDTH] IN BLOOD BY AUTOMATED COUNT: 45.4 FL (ref 35.9–50)
GFR SERPL CREATININE-BSD FRML MDRD: >60 ML/MIN/1.73 M 2
GLUCOSE SERPL-MCNC: 106 MG/DL (ref 65–99)
GLUCOSE UR STRIP.AUTO-MCNC: NEGATIVE MG/DL
GRAN CASTS #/AREA URNS LPF: ABNORMAL /LPF
HCG UR QL: NEGATIVE
HCT VFR BLD AUTO: 38.8 % (ref 37–47)
HGB BLD-MCNC: 12.7 G/DL (ref 12–16)
HYALINE CASTS #/AREA URNS LPF: ABNORMAL /LPF
IMM GRANULOCYTES # BLD AUTO: 0.11 K/UL (ref 0–0.11)
IMM GRANULOCYTES NFR BLD AUTO: 0.7 % (ref 0–0.9)
KETONES UR STRIP.AUTO-MCNC: NEGATIVE MG/DL
LEUKOCYTE ESTERASE UR QL STRIP.AUTO: NEGATIVE
LYMPHOCYTES # BLD AUTO: 2.6 K/UL (ref 1–4.8)
LYMPHOCYTES NFR BLD: 15.8 % (ref 22–41)
MCH RBC QN AUTO: 29.9 PG (ref 27–33)
MCHC RBC AUTO-ENTMCNC: 32.7 G/DL (ref 33.6–35)
MCV RBC AUTO: 91.3 FL (ref 81.4–97.8)
MICRO URNS: ABNORMAL
MONOCYTES # BLD AUTO: 0.84 K/UL (ref 0–0.85)
MONOCYTES NFR BLD AUTO: 5.1 % (ref 0–13.4)
NEUTROPHILS # BLD AUTO: 12.6 K/UL (ref 2–7.15)
NEUTROPHILS NFR BLD: 76.4 % (ref 44–72)
NITRITE UR QL STRIP.AUTO: NEGATIVE
NRBC # BLD AUTO: 0 K/UL
NRBC BLD AUTO-RTO: 0 /100 WBC
PH UR STRIP.AUTO: 5.5 [PH]
PLATELET # BLD AUTO: 288 K/UL (ref 164–446)
PMV BLD AUTO: 10.3 FL (ref 9–12.9)
POTASSIUM SERPL-SCNC: 3.8 MMOL/L (ref 3.6–5.5)
PROCALCITONIN SERPL-MCNC: 0.2 NG/ML
PROT UR QL STRIP: 30 MG/DL
RBC # BLD AUTO: 4.25 M/UL (ref 4.2–5.4)
RBC # URNS HPF: ABNORMAL /HPF
RBC UR QL AUTO: NEGATIVE
SODIUM SERPL-SCNC: 139 MMOL/L (ref 135–145)
SP GR UR REFRACTOMETRY: >1.045
SP GR UR STRIP.AUTO: >1.045
UROBILINOGEN UR STRIP.AUTO-MCNC: 1 MG/DL
WBC # BLD AUTO: 16.5 K/UL (ref 4.8–10.8)
WBC #/AREA URNS HPF: ABNORMAL /HPF

## 2017-10-31 PROCEDURE — 360979 HCHG DIAGNOSTIC CATH

## 2017-10-31 PROCEDURE — C1887 CATHETER, GUIDING: HCPCS

## 2017-10-31 PROCEDURE — C1894 INTRO/SHEATH, NON-LASER: HCPCS

## 2017-10-31 PROCEDURE — 93460 R&L HRT ART/VENTRICLE ANGIO: CPT

## 2017-10-31 PROCEDURE — 700117 HCHG RX CONTRAST REV CODE 255: Performed by: INTERNAL MEDICINE

## 2017-10-31 PROCEDURE — 4A023N8 MEASUREMENT OF CARDIAC SAMPLING AND PRESSURE, BILATERAL, PERCUTANEOUS APPROACH: ICD-10-PCS | Performed by: INTERNAL MEDICINE

## 2017-10-31 PROCEDURE — 81025 URINE PREGNANCY TEST: CPT

## 2017-10-31 PROCEDURE — B2111ZZ FLUOROSCOPY OF MULTIPLE CORONARY ARTERIES USING LOW OSMOLAR CONTRAST: ICD-10-PCS | Performed by: INTERNAL MEDICINE

## 2017-10-31 PROCEDURE — 700111 HCHG RX REV CODE 636 W/ 250 OVERRIDE (IP)

## 2017-10-31 PROCEDURE — 700105 HCHG RX REV CODE 258: Performed by: HOSPITALIST

## 2017-10-31 PROCEDURE — A9270 NON-COVERED ITEM OR SERVICE: HCPCS | Performed by: INTERNAL MEDICINE

## 2017-10-31 PROCEDURE — B2151ZZ FLUOROSCOPY OF LEFT HEART USING LOW OSMOLAR CONTRAST: ICD-10-PCS | Performed by: INTERNAL MEDICINE

## 2017-10-31 PROCEDURE — A9270 NON-COVERED ITEM OR SERVICE: HCPCS | Performed by: HOSPITALIST

## 2017-10-31 PROCEDURE — 307093 HCHG TR BAND RADIAL

## 2017-10-31 PROCEDURE — 87086 URINE CULTURE/COLONY COUNT: CPT

## 2017-10-31 PROCEDURE — C1769 GUIDE WIRE: HCPCS

## 2017-10-31 PROCEDURE — 99233 SBSQ HOSP IP/OBS HIGH 50: CPT | Performed by: INTERNAL MEDICINE

## 2017-10-31 PROCEDURE — 700102 HCHG RX REV CODE 250 W/ 637 OVERRIDE(OP): Performed by: HOSPITALIST

## 2017-10-31 PROCEDURE — 99152 MOD SED SAME PHYS/QHP 5/>YRS: CPT

## 2017-10-31 PROCEDURE — 770020 HCHG ROOM/CARE - TELE (206)

## 2017-10-31 PROCEDURE — 85025 COMPLETE CBC W/AUTO DIFF WBC: CPT

## 2017-10-31 PROCEDURE — 99153 MOD SED SAME PHYS/QHP EA: CPT

## 2017-10-31 PROCEDURE — 36415 COLL VENOUS BLD VENIPUNCTURE: CPT

## 2017-10-31 PROCEDURE — 84145 PROCALCITONIN (PCT): CPT

## 2017-10-31 PROCEDURE — 361014 HCHG 6FR ARROW SWAN/THERMO

## 2017-10-31 PROCEDURE — 700101 HCHG RX REV CODE 250

## 2017-10-31 PROCEDURE — 700102 HCHG RX REV CODE 250 W/ 637 OVERRIDE(OP): Performed by: INTERNAL MEDICINE

## 2017-10-31 PROCEDURE — 80048 BASIC METABOLIC PNL TOTAL CA: CPT

## 2017-10-31 PROCEDURE — 81001 URINALYSIS AUTO W/SCOPE: CPT

## 2017-10-31 RX ORDER — MIDAZOLAM HYDROCHLORIDE 1 MG/ML
INJECTION INTRAMUSCULAR; INTRAVENOUS
Status: COMPLETED
Start: 2017-10-31 | End: 2017-10-31

## 2017-10-31 RX ORDER — HEPARIN SODIUM,PORCINE 1000/ML
VIAL (ML) INJECTION
Status: COMPLETED
Start: 2017-10-31 | End: 2017-10-31

## 2017-10-31 RX ORDER — VERAPAMIL HYDROCHLORIDE 2.5 MG/ML
INJECTION, SOLUTION INTRAVENOUS
Status: COMPLETED
Start: 2017-10-31 | End: 2017-10-31

## 2017-10-31 RX ORDER — LIDOCAINE HYDROCHLORIDE 20 MG/ML
INJECTION, SOLUTION INFILTRATION; PERINEURAL
Status: COMPLETED
Start: 2017-10-31 | End: 2017-10-31

## 2017-10-31 RX ORDER — ALBUTEROL SULFATE 90 UG/1
2 AEROSOL, METERED RESPIRATORY (INHALATION) EVERY 4 HOURS
Status: DISCONTINUED | OUTPATIENT
Start: 2017-10-31 | End: 2017-11-01 | Stop reason: HOSPADM

## 2017-10-31 RX ADMIN — SODIUM CHLORIDE: 9 INJECTION, SOLUTION INTRAVENOUS at 08:32

## 2017-10-31 RX ADMIN — SODIUM CHLORIDE: 9 INJECTION, SOLUTION INTRAVENOUS at 20:55

## 2017-10-31 RX ADMIN — MIDAZOLAM 1 MG: 1 INJECTION INTRAMUSCULAR; INTRAVENOUS at 11:27

## 2017-10-31 RX ADMIN — ASPIRIN 325 MG: 325 TABLET, COATED ORAL at 08:31

## 2017-10-31 RX ADMIN — HEPARIN SODIUM 2000 UNITS: 200 INJECTION, SOLUTION INTRAVENOUS at 10:51

## 2017-10-31 RX ADMIN — IOHEXOL 82 ML: 350 INJECTION, SOLUTION INTRAVENOUS at 11:30

## 2017-10-31 RX ADMIN — VERAPAMIL HYDROCHLORIDE 5 MG: 2.5 INJECTION, SOLUTION INTRAVENOUS at 10:51

## 2017-10-31 RX ADMIN — MIDAZOLAM 2 MG: 1 INJECTION INTRAMUSCULAR; INTRAVENOUS at 10:57

## 2017-10-31 RX ADMIN — NITROGLYCERIN 10 ML: 20 INJECTION INTRAVENOUS at 10:57

## 2017-10-31 RX ADMIN — FLUOXETINE HYDROCHLORIDE 80 MG: 20 CAPSULE ORAL at 08:31

## 2017-10-31 RX ADMIN — METOPROLOL TARTRATE 12.5 MG: 25 TABLET ORAL at 20:14

## 2017-10-31 RX ADMIN — ATORVASTATIN CALCIUM 40 MG: 40 TABLET, FILM COATED ORAL at 20:14

## 2017-10-31 RX ADMIN — ALBUTEROL SULFATE 2 PUFF: 90 AEROSOL, METERED RESPIRATORY (INHALATION) at 22:44

## 2017-10-31 RX ADMIN — HEPARIN SODIUM: 1000 INJECTION, SOLUTION INTRAVENOUS; SUBCUTANEOUS at 10:51

## 2017-10-31 RX ADMIN — FENTANYL CITRATE 100 MCG: 50 INJECTION, SOLUTION INTRAMUSCULAR; INTRAVENOUS at 10:57

## 2017-10-31 RX ADMIN — LIDOCAINE HYDROCHLORIDE: 20 INJECTION, SOLUTION INFILTRATION; PERINEURAL at 10:51

## 2017-10-31 RX ADMIN — METOPROLOL TARTRATE 25 MG: 25 TABLET ORAL at 08:30

## 2017-10-31 RX ADMIN — ALBUTEROL SULFATE 2 PUFF: 90 AEROSOL, METERED RESPIRATORY (INHALATION) at 16:07

## 2017-10-31 RX ADMIN — ALBUTEROL SULFATE 2 PUFF: 90 AEROSOL, METERED RESPIRATORY (INHALATION) at 18:26

## 2017-10-31 ASSESSMENT — ENCOUNTER SYMPTOMS
NERVOUS/ANXIOUS: 0
HEADACHES: 0
NECK PAIN: 0
FEVER: 0
NAUSEA: 0
DIARRHEA: 0
BLOOD IN STOOL: 0
PALPITATIONS: 0
SPEECH CHANGE: 0
ABDOMINAL PAIN: 0
BRUISES/BLEEDS EASILY: 0
HEMOPTYSIS: 0
FOCAL WEAKNESS: 0
WEIGHT LOSS: 0
HEARTBURN: 0
SPUTUM PRODUCTION: 0
CLAUDICATION: 0
DIZZINESS: 0
SHORTNESS OF BREATH: 1
BACK PAIN: 0
ORTHOPNEA: 1
COUGH: 1
CHILLS: 0
DOUBLE VISION: 0
WHEEZING: 0
SHORTNESS OF BREATH: 0
SENSORY CHANGE: 0

## 2017-10-31 ASSESSMENT — PAIN SCALES - GENERAL
PAINLEVEL_OUTOF10: 1
PAINLEVEL_OUTOF10: 4
PAINLEVEL_OUTOF10: 0

## 2017-10-31 NOTE — CARE PLAN
Problem: Fluid Volume:  Goal: Will maintain balanced intake and output  Outcome: PROGRESSING AS EXPECTED  Patient at increased risk for fluid volume deficit due to Cath procedure. Assessed client and monitored for signs of circulatory compromise and hemorrhage. Patient maintained adequate blood pressure throughout shift and did not experience any signs of hemorrhage.

## 2017-10-31 NOTE — PROGRESS NOTES
Cardiology Progress Note               Author: Troy Lockett Date & Time created: 10/31/2017  11:02 AM     Interval History:  The patient was readmitted last evening with another episode of dyspnea. She was hospitalized here in early September with similar complaints. No evidence of pulmonary embolism. By echo last month, her pulmonary pressure was elevated at 50 mmHg. He is in process of being worked up for sleep apnea.  Lab on this admission notable for troponin of 1.55.  Review of Systems   Constitutional: Negative for fever.   Respiratory: Negative for hemoptysis and shortness of breath.    Cardiovascular: Negative for chest pain.   Endo/Heme/Allergies: Does not bruise/bleed easily.       Physical Exam   Constitutional: She is oriented to person, place, and time. No distress.   HENT:   Head: Normocephalic and atraumatic.   Cardiovascular: Normal rate and regular rhythm.    No murmur heard.  Pulmonary/Chest: No respiratory distress. She has no wheezes.   Neurological: She is alert and oriented to person, place, and time.   Skin: Skin is warm and dry.       Hemodynamics:  Temp (24hrs), Av.8 °C (98.3 °F), Min:36.2 °C (97.1 °F), Max:37.6 °C (99.7 °F)  Temperature: 36.4 °C (97.6 °F)  Pulse  Av.9  Min: 68  Max: 119   Blood Pressure: 111/67, NIBP: 122/78     Respiratory:    Respiration: 20, Pulse Oximetry: 99 %, O2 Daily Delivery Respiratory : Room Air with O2 Available     Work Of Breathing / Effort: Mild     Fluids:     Weight: 117.8 kg (259 lb 11.2 oz)  GI/Nutrition:  Orders Placed This Encounter   Procedures   • Diet NPO after Midnight     Standing Status:   Standing     Number of Occurrences:   8     Order Specific Question:   Restrict to:     Answer:   Sips with Medications [3]     Lab Results:  Recent Labs      10/30/17   1605  10/30/17   2231  10/31/17   0248   WBC  18.0*  16.9*  16.5*   RBC  4.56  4.15*  4.25   HEMOGLOBIN  13.6  12.3  12.7   HEMATOCRIT  40.7  37.3  38.8   MCV  89.3  89.9  91.3   MCH   29.8  29.6  29.9   MCHC  33.4*  33.0*  32.7*   RDW  43.2  43.9  45.4   PLATELETCT  267  262  288   MPV  10.1  10.2  10.3     Recent Labs      10/30/17   1605  10/30/17   2231  10/31/17   0248   SODIUM  137  139  139   POTASSIUM  3.7  3.7  3.8   CHLORIDE  105  105  106   CO2  18*  25  25   GLUCOSE  106*  118*  106*   BUN  6*  6*  7*   CREATININE  0.68  0.71  1.03   CALCIUM  8.8  8.5  8.6     Recent Labs      10/30/17   1605  10/30/17   2231   APTT  23.8*   --    INR  0.98  1.05     Recent Labs      10/30/17   1605   BNPBTYPENAT  209*     Recent Labs      10/30/17   1605  10/30/17   1811  10/30/17   1840  10/30/17   2231   TROPONINI  0.95*  1.54*  1.55*  1.53*   BNPBTYPENAT  209*   --    --    --              Medical Decision Making, by Problem:  Active Hospital Problems    Diagnosis   • Elevated troponin [R74.8]   • SIRS (systemic inflammatory response syndrome) (CMS-HCC) [R65.10]       Plan:  The etiology of her pulmonary hypertension is unknown. Suspect may be secondary to untreated sleep apnea. The patient scheduled for left and right heart catheterization later today. She was also noted to have elevated troponin on this admission. The patient also has a history of asthma. Continue current medications. Reassess medical regimen after results of heart cath.      Quality-Core Measures

## 2017-10-31 NOTE — PROGRESS NOTES
[facility] Hospitalist Progress Note    Date of Service: 10/31/2017    Chief Complaint  37 y.o. female admitted 10/30/2017 with shortness of breath and elevated troponin of 1.53.    Interval Problem Update  The patient underwent coronary angiography that revealed no significant coronary artery disease and mixed Pulmonary hypertension with mean PA of 44 mmHg, LVEDP 21, PVR 6.5. Likely cause of her pulmonary hypertension is obstructive sleep apnea. The patient is instructed to follow up outpatient for sleep study. Diagnosis and plan of care discussed with patient and family. All questions answered. Continue IV fluid hydration, supplemental oxygen and RT protocol.    Consultants/Specialty  Cardiology    Disposition  Likely home tomorrow        Review of Systems   Constitutional: Negative for chills and fever.   Respiratory: Positive for shortness of breath. Negative for wheezing.    Cardiovascular: Negative for chest pain.   Gastrointestinal: Negative for heartburn and nausea.   All other systems reviewed and are negative.     Physical Exam  Laboratory/Imaging   Hemodynamics  Temp (24hrs), Av.8 °C (98.3 °F), Min:36.2 °C (97.1 °F), Max:37.6 °C (99.7 °F)   Temperature: 36.4 °C (97.6 °F)  Pulse  Av.9  Min: 68  Max: 119    Blood Pressure: 111/67, NIBP: 122/78      Respiratory      Respiration: 20, Pulse Oximetry: 99 %, O2 Daily Delivery Respiratory : Room Air with O2 Available     Work Of Breathing / Effort: Mild       Fluids  No intake or output data in the 24 hours ending 10/31/17 0845    Nutrition  Orders Placed This Encounter   Procedures   • Diet NPO after Midnight     Standing Status:   Standing     Number of Occurrences:   8     Order Specific Question:   Restrict to:     Answer:   Sips with Medications [3]     Physical Exam   Constitutional: She is oriented to person, place, and time. She appears well-developed and well-nourished. No distress.   Obese   HENT:   Head: Normocephalic and atraumatic.    Mouth/Throat: Oropharynx is clear and moist.   Eyes: Conjunctivae are normal. Pupils are equal, round, and reactive to light.   Neck: Neck supple.   Cardiovascular: Normal rate and regular rhythm.    Pulmonary/Chest: No respiratory distress. She has no wheezes. She has no rales.   Distant and diminished breath sounds   Abdominal: Soft. Bowel sounds are normal. She exhibits no distension. There is no tenderness.   Musculoskeletal: Normal range of motion. She exhibits no edema.   Neurological: She is alert and oriented to person, place, and time. No cranial nerve deficit.   Skin: Skin is warm and dry.   Psychiatric: She has a normal mood and affect. Her behavior is normal.   Nursing note and vitals reviewed.      Recent Labs      10/30/17   1605  10/30/17   2231  10/31/17   0248   WBC  18.0*  16.9*  16.5*   RBC  4.56  4.15*  4.25   HEMOGLOBIN  13.6  12.3  12.7   HEMATOCRIT  40.7  37.3  38.8   MCV  89.3  89.9  91.3   MCH  29.8  29.6  29.9   MCHC  33.4*  33.0*  32.7*   RDW  43.2  43.9  45.4   PLATELETCT  267  262  288   MPV  10.1  10.2  10.3     Recent Labs      10/30/17   1605  10/30/17   2231  10/31/17   0248   SODIUM  137  139  139   POTASSIUM  3.7  3.7  3.8   CHLORIDE  105  105  106   CO2  18*  25  25   GLUCOSE  106*  118*  106*   BUN  6*  6*  7*   CREATININE  0.68  0.71  1.03   CALCIUM  8.8  8.5  8.6     Recent Labs      10/30/17   1605  10/30/17   2231   APTT  23.8*   --    INR  0.98  1.05     Recent Labs      10/30/17   1605   BNPBTYPENAT  209*              Assessment/Plan     Bronchitis   Assessment & Plan    pro-calcitonin of the normal limits  Discontinue Unasyn, continue azithromycin for anti-inflammatory effect  Supplemental oxygen  RT protocol  Albuterol when necessary        Elevated troponin- (present on admission)   Assessment & Plan    Likely secondary to demand ischemia setting of bronchitis and underlying TIMI  No active chest pain  Coronary angiography reveals no significant CAD          Pulmonary  hypertension- (present on admission)   Assessment & Plan    No evidence of pulmonary embolism on CTA  Likely secondary to obstructive sleep apnea, outpatient sleep study is recommended  Continue supplemental oxygen        Patient plan of care discussed at multidisplinary team rounds, with cardiology, with patient and R.N at beside.      Reviewed items::  Labs reviewed, Radiology images reviewed and Medications reviewed  DVT prophylaxis pharmacological::  Enoxaparin (Lovenox)  Antibiotics:  Treating active infection/contamination beyond 24 hours perioperative coverage      Time spent: 32 minutes. > 50 % time spend providing counseling / co ordination of care.

## 2017-10-31 NOTE — PROGRESS NOTES
2 RN skin check completed.   Pts skin is clean, dry and intact. No signs of skin breakdown noted.

## 2017-10-31 NOTE — PROGRESS NOTES
Pt states her headache has improved, ice pack given. Pt has no signs of distress. Call light within reach, bed in lowest locked position, hourly rounding in place. Tele box on and rhythm verified.

## 2017-10-31 NOTE — ED NOTES
ERP notified of increasingly elevated trop-I. ERP request that RN page admitting MD and notify of lab change.

## 2017-10-31 NOTE — CONSULTS
DATE OF SERVICE:  10/30/2017    CONTINUATION OF DICTATION     REFERRING PHYSICIANS:  Dr. Leticia Lopez.    CHIEF COMPLAINT:  Shortness of breath with abnormal troponin levels and   pulmonary hypertension.    HISTORY OF PRESENT ILLNESS:  The patient is a 37-year-old female without   previous cardiac history.  She was admitted to [facility] last   month for shortness of breath and underwent consultation by Dr. Balbir Lassiter.   She underwent a CT evaluation of the chest on 2017 which showed no   evidence of pulmonary embolism.  She also underwent an echocardiogram on   2017 which shows normal left ventricular systolic function with ejection   fraction of 60%, mild tricuspid regurgitation and elevated PA systolic   pressure of 50 mmHg.  The patient has pending a sleep apnea workup and also   right heart catheterization.  The patient was well until a couple days ago   when she started to have had a nasal congestion.  Today, she suffered a   shortness of breath episode again and was rebrought to [facility]   Emergency Room.  She again underwent a CT scan which showed no evidence of   pulmonary embolism.  She also went an EKG evaluation, which showed no   significant ST segment changes.  Her laboratory examination, however, showed   elevated troponin of 1.55.  She denies chest pain.  She does admit to left arm   or left axillary ache.  She denies nausea, vomiting or diaphoresis.    ALLERGIES:  ERYTHROMYCIN AND PENICILLIN.    CURRENT MEDICATIONS:  Trivora and Prozac 40 mg daily.    PAST MEDICAL ILLNESS:  As above plus ocular migraine, anxiety and depression.    PAST SURGICAL HISTORY:  Tonsillectomy, , and knee surgery.    SOCIAL HISTORY:  She is , negative for tobacco use.    FAMILY HISTORY:  Positive for DVT, pulmonary embolism with her father.    REVIEW OF SYSTEMS:  Positive for nasal congestion, shortness of breath.  GENERAL: The patient denies fatigue,  fever, chills or weight changes.   HEENT: The patient denies visual or hearing changes.   CENTRAL NERVOUS SYSTEM: The patient denies lightheadedness,   syncope or seizures.   ENDOCRINE: The patient denies thyroid disorder or diabetes.   RESPIRATORY: The patient denies productive cough, asthma or emphysema.  CARDIOVASCULAR: As above.   GASTROINTESTINAL: The patient denies bowel changes.   GENITOURINARY: The patient denies urinary changes.   PSYCHIATRIC: As above.  EXTREMITIES: The patient denies arthritis.    PHYSICAL EXAMINATION:  Includes:  VITAL SIGNS:  Pulse 92, respirations 20, /83, temperature 37.6.  GENERAL:  No acute distress.  HEENT:  Eyes equal, oral moist.  NECK:  Supple.  RESPIRATORY:  Clear to auscultation bilaterally.  Good effort.  CARDIOVASCULAR:  S1, S2 regular rate and rhythm without S3, S4 or murmur.  ABDOMEN:  Soft, nondistended, nontender.  No hepatosplenomegaly noted.  EXTREMITIES:  Upper extremities:  No clubbing, cyanosis.  Lower extremities:    No edema.  NEUROLOGIC:  Alert and oriented x3.  PSYCHIATRIC:  No anxiety or depression.  SKIN:  Warm and dry.    IMAGING:  EKG as above.  Echocardiogram as above.  CT scan as above.    LABORATORY DATA:  Troponin I as above.  Chem-7 with potassium 3.7, BUN of 6,   creatinine of 0.6 today.  CBC with WBC of 18, H and H of 13 and 40, platelets   of 267.    ASSESSMENT AND PLAN:  1.  Shortness of breath with elevated troponin and pulmonary hypertension:    The patient is a 37-year-old female without previous cardiac history.  She has   had 2 admissions for shortness of breath and both times were ruled out a   pulmonary embolism by CT scan evaluation.  Her echocardiogram previously did   show elevated PA systolic pressure of 50 mmHg and she is pending sleep apnea   workup as well as a right heart catheterization.  Her laboratory examination   showed elevated troponin of 1.55.  The patient will be admitted to telemetry.    We will perform cardiac  catheterization to rule out epicardial coronary   artery disease.  2.  Pulmonary hypertension:  We will perform a right heart catheterization to    further assess her pulmonary hypertension as previously planned.    Thank you for this consult.       ____________________________________     MD MIRTA WONG / VAL    DD:  10/30/2017 20:21:52  DT:  10/30/2017 23:54:45    D#:  [id number]  Job#:  [id number]

## 2017-10-31 NOTE — CARE PLAN
Problem: Safety  Goal: Will remain free from injury  Outcome: PROGRESSING AS EXPECTED  Call light within reach, bed in lowest locked position, hourly rounding in place. Tele box on and rhythm verified. Bed alarm on. Treaded socks on. Appropriate signs placed. Mobility assessed, pt is steady and upself. Oxygen extension tubing in place.      Problem: Knowledge Deficit  Goal: Knowledge of disease process/condition, treatment plan, diagnostic tests, and medications will improve  Outcome: PROGRESSING AS EXPECTED  Educate pt about medicaitons, treatments and procedures. Pt is able to verbalize and teach back education.

## 2017-10-31 NOTE — PROGRESS NOTES
Pt up to floor, report received from ED RN. Pt had some N/V on the way up to floor, pt states after throwing up she feels better and the nausea has gone away. Pt has some SOB, pt states she has O2 at home for as needed, pt became SOB she was put on 2 liters NC, extension tubing in place. Pt denies any chest pain. Assessment complete. No signs of distress. Tele box on and rhythm verified.

## 2017-10-31 NOTE — PROCEDURES
REFERRING PHYSICIAN: Dr. Carl Purcell    PREOPERATIVE DIAGNOSIS:  1. Pulmonary hypertension  2. Dyspnea  3. Elevated troponin  4. Morbid obesity    POSTOPERATIVE DIAGNOSIS:  1. No epicardial coronary artery disease  2. Mixed pulmonary hypertension (mean PA 44 mmHg, LVEDP 21 mmHg, PVR 6.5 Wood units )  3. Normal left ventricular ejection fraction  4. Biventricular diastolic heart failure      PROCEDURE PERFORMED:  Selective coronary angiography of the native vessels  Left heart catheterization  Left ventriculogram  Right heart catheterization    DESCRIPTION OF PROCEDURE:  The risks and benefits of cardiac catheterization as well as the procedure itself, rationale and appropriateness were discussed with the patient today. Complications including but not limited to death, stroke, MI, urgent bypass surgery, contrast nephropathy, vascular complications, bleeding and infection were explained to the patient. The potential outcomes associated with the procedure (possible PCI, possible CABG, possible medical Rx only) were also discussed at length. The patient agreed to proceed.  The patient was transported to the catheterization laboratory in the fasting state.     Through an existing right brachial 18-gauge IV and an 035 exchange wire was placed in the IV was exchanged for a 6 Georgian venous sheath in the right brachial area. Following this access was obtained in the right wrist but no cocktail was given. This is described below. Through the venous sheath a 6 Georgian balloontipped [place]ra catheter was advanced through the right heart into the pulmonary circulation without difficulty. Routine hemodynamics were obtained. [place]ra catheter was removed.    The right radial area and right groin were prepped and draped in the usual fashion. Right radial area was entered with a single through and through puncture and a 6F glide sheath was placed. An intra-arterial cocktail of heparin, verapamil and nitroglycerine was  administered. Over a wire, a 5F Andreia catheter was passed to the aortic root and used to engage the right and left coronaries without difficulty. Contrast was administered and multiple images obtained. This catheter was then exchanged for 6 Kittitian angled pigtail catheter used to cross the aortic valve for LHC and contrast was administered at 10 mL/s for 30 mL for left ventriculogram. All catheters and guidewires were removed and a TR band was applied to achieve patent hemostasis. Patient left the cath lab in stable condition.        FINDINGS:  HEMODYNAMIC FINDINGS:  AoP  109/83 (95)  mmHg    mmHg   LVEDP 21 mmHg   RA 19  mmHg   RV 51/15  mmHg   RVEDP 21  mmHg   PA 57/36  mmHg   mPAP 44  mmHg   PCWP 20  mmHg   TDCO 3.7  L/min   TDCI 1.64  L/min/m2   PVR 6.5  W.U.       II. LEFT VENTRICULOGRAM:   LVEF ESTRADA PROJECTION:70 %     III. CORONARY ANGIOGRAPHY:  Left Main: Large short bifurcating no CAD  Left Anterior Descending: Moderate sized and long with a moderate size 1st diagonal. It becomes a diffusely small vessel in its midportion to its termination at the apex. No epicardial CAD noted.  Left Circumflex: Moderate sized, dominant, no CAD.  Right Coronary: Anterior takeoff from the right coronary cusp, small size nondominant no CAD    COMPLICATIONS: none apparent    CONCLUSIONS:  1. No epicardial coronary artery disease  2. Mixed pulmonary hypertension (mean PA 44 mmHg, LVEDP 21 mmHg, PVR 6.5 Wood units )  3. Normal left ventricular ejection fraction  4. Biventricular diastolic heart failure

## 2017-10-31 NOTE — ASSESSMENT & PLAN NOTE
pro-calcitonin of the normal limits  Discontinue Unasyn, continue azithromycin for anti-inflammatory effect  Supplemental oxygen  RT protocol  Albuterol when necessary

## 2017-10-31 NOTE — PROGRESS NOTES
Patient returned from Cath Lab to Telemetry 731-1. Received report from Cath Lab RN. Hourly rounding re-initiated. Patient claims 7/10 pain described as a stinging sensation centralized to R wrist at procedure site. Patient informed of dietary changes and plan of care. Client resting well and in no acute distress at this time.

## 2017-10-31 NOTE — CARE PLAN
Problem: Knowledge Deficit  Goal: Knowledge of disease process/condition, treatment plan, diagnostic tests, and medications will improve  Outcome: PROGRESSING AS EXPECTED  Knowledge deficit related to new diagnosis of Pulmonary Hypertension as evidenced by patient/family questions regarding pulmonary hypertension.     Patient and family were provided a verbal discussion and printed [employer] handout explaining pulmonary hypertension to include etiology, management, and common forms of treatment. Patient/family verbalized understanding of all information presented and have no questions or concerns.

## 2017-10-31 NOTE — ASSESSMENT & PLAN NOTE
No evidence of pulmonary embolism on CTA  Likely secondary to obstructive sleep apnea, outpatient sleep study is recommended  Continue supplemental oxygen

## 2017-10-31 NOTE — H&P
Hospital Medicine History and Physical    Date of Service  Patient seen and examined 10/30/2017    Chief Complaint  Chief Complaint   Patient presents with   • Shortness of Breath     x 2 days. had similar symptoms a month ago. worked up for PE w/negative result. was told that she might poss have pulmonary htn and sleep apnea.   • Chest Pain     left side of chest. had positive troponin a month ago.       History of Presenting Illness  37 y.o. female who presented 10/30/2017 with Shortness of breath and chest discomfort. This is a 37-year-old female admitted previously been admitted in September for concerns of shortness of breath. She had an echocardiogram which showed reserved ejection fraction and had a negative CTA PE study. She also had negative cardiac stress test. She was set up for a polysomnogram test but this is still pending. Patient presents again due to worsening shortness of breath. She is currently in no respiratory distress she is able to talk in full sentences with clear speech. Currently she states her breathing is slightly improved. Her breathing gets worse with exertional activities. Patient has had some left-sided chest discomfort but nonradiating. She denies any coughs, pleuritic chest pain, fevers, chills, no recent traumas to the chest.    Initial EKG and troponin were discussed with emergency room physician. Her initial troponin is elevated at 0.95. Later during her stay her troponin did gone up to 1.55. I have consulted and discussed care with Dr. Carl Purcell, cardiology.    Primary Care Physician  Quynh Joel M.D.    Code Status  Full    Review of Systems  Review of Systems   Constitutional: Negative for chills and fever.   HENT: Negative for congestion.    Eyes: Negative for double vision.   Respiratory: Positive for cough and shortness of breath. Negative for sputum production and wheezing.    Cardiovascular: Positive for chest pain and orthopnea. Negative for palpitations,  "claudication and leg swelling.   Gastrointestinal: Negative for abdominal pain, blood in stool, diarrhea, heartburn, melena and nausea.   Genitourinary: Negative for dysuria.   Musculoskeletal: Negative for back pain and neck pain.   Neurological: Negative for dizziness, speech change and headaches.   Psychiatric/Behavioral: The patient is not nervous/anxious.           Past Medical History  Past Medical History:   Diagnosis Date   • Ocular migraine 2015   • Anxiety and depression 2015   • Chronic diarrhea 2015   • Chickenpox        Surgical History  Past Surgical History:   Procedure Laterality Date   • PRIMARY C SECTION  2016    Procedure: PRIMARY C SECTION;  Surgeon: Delphine Carbone M.D.;  Location: LABOR AND DELIVERY;  Service:    • ARTHROSCOPY, KNEE     • TONSILLECTOMY         Medications  No current facility-administered medications on file prior to encounter.      Current Outpatient Prescriptions on File Prior to Encounter   Medication Sig Dispense Refill   • fluoxetine (PROZAC) 40 MG capsule Take 80 mg by mouth every day.         Family History  Family History   Problem Relation Age of Onset   • Sleep Apnea Mother    • Hypertension Father    • Heart Disease Father    • DVT Father        Social History  Social History   Substance Use Topics   • Smoking status: Never Smoker   • Smokeless tobacco: Never Used   • Alcohol use Yes      Comment: once every 3 weeks       Allergies  Allergies   Allergen Reactions   • Erythromycin      Pt reports \" crying for no reason, and my jaw locked years ago\"    • Pcn [Penicillins] Hives        Physical Exam  Laboratory   Hemodynamics  Temp (24hrs), Av.6 °C (99.7 °F), Min:37.6 °C (99.6 °F), Max:37.6 °C (99.7 °F)   Temperature: 37.6 °C (99.6 °F)  Pulse  Av.5  Min: 86  Max: 119    Blood Pressure: 138/83, NIBP: 122/78      Respiratory      Respiration: 18, Pulse Oximetry: 94 %, O2 Daily Delivery Respiratory : Room Air with O2 Available         "     Physical Exam   Constitutional: She is oriented to person, place, and time. She appears well-developed and well-nourished. No distress.   Obese   HENT:   Head: Normocephalic and atraumatic.   Nose: Nose normal.   Mouth/Throat: Oropharynx is clear and moist.   Eyes: Conjunctivae and EOM are normal. Right eye exhibits no discharge. Left eye exhibits no discharge. No scleral icterus.   Neck: No tracheal deviation present.   Cardiovascular: Normal rate, regular rhythm, normal heart sounds and intact distal pulses.    No murmur heard.  Pulmonary/Chest: Effort normal and breath sounds normal. No stridor. No respiratory distress. She has no wheezes.   Abdominal: Soft. Bowel sounds are normal. She exhibits no distension. There is no tenderness.   Musculoskeletal: She exhibits no edema.   Neurological: She is alert and oriented to person, place, and time. No cranial nerve deficit.   Skin: Skin is warm. She is not diaphoretic.   Psychiatric: She has a normal mood and affect. Her behavior is normal. Thought content normal.   Vitals reviewed.      Recent Labs      10/30/17   1605  10/30/17   2231   WBC  18.0*  16.9*   RBC  4.56  4.15*   HEMOGLOBIN  13.6  12.3   HEMATOCRIT  40.7  37.3   MCV  89.3  89.9   MCH  29.8  29.6   MCHC  33.4*  33.0*   RDW  43.2  43.9   PLATELETCT  267  262   MPV  10.1  10.2     Recent Labs      10/30/17   1605  10/30/17   2231   SODIUM  137  139   POTASSIUM  3.7  3.7   CHLORIDE  105  105   CO2  18*  25   GLUCOSE  106*  118*   BUN  6*  6*   CREATININE  0.68  0.71   CALCIUM  8.8  8.5     Recent Labs      10/30/17   1605  10/30/17   2231   APTT  23.8*   --    INR  0.98  1.05     Recent Labs      10/30/17   1605   BNPBTYPENAT  209*           Imaging  10/30 chest x-ray: Hyperinflation without other evidence of acute cardiopulmonary disease     10/30 CTA chest:   1 no evidence of pulmonary embolism.   2 mild diffuse bronchial wall thickening suggesting bronchitis   3 left lower lobe calcified nodule  suggesting old granulomatous disease.    Assessment/Plan     I anticipate this patient will require at least two midnights for appropriate medical management, necessitating inpatient admission.    SIRS (systemic inflammatory response syndrome) (CMS-[facility])   Assessment & Plan    Checking pro-calcitonin  Monitor vitals  Follow-up a.m. Labs  Initiated on antibiotics for possible bronchitis  Supplemental oxygen        Elevated troponin   Assessment & Plan    Question secondary to demand ischemia.  Patient with concerns of bronchitis on x-ray. Patient has a pickwickian body habitus  Cardiology consulted discussed with Dr. Carl Purcell.  Recent negative cardiac stress test  Follow up on serial troponins              VTE prophylaxis Lovenox .

## 2017-10-31 NOTE — PROGRESS NOTES
Subjective:      Modesta Archuleta is a 37 y.o. female who presents with Shortness of Breath (SOB, heart beat feels fast)            3 days SOB and tachycardia. She has a pulse ox that has been reading in the mid 80's. She had a similar episode one month ago where she was hospitalized and notes that she had elevated cardiac enzymes. At that time she was evaluated and told no PE, possible pulmonary HTN and possible TIMI. She has not yet had a sleep study. No current CP or limb swelling. She does have a productive cough without blood in sputum. +nasal congestion. No fever. No wheeze.           Review of Systems   Constitutional: Negative for malaise/fatigue and weight loss.   Skin: Negative for itching.   Neurological: Negative for sensory change and focal weakness.     .  Medications, Allergies, and current problem list reviewed today in Epic       Objective:   Physical Exam   Constitutional: She appears well-developed and well-nourished. No distress.   HENT:   Head: Normocephalic and atraumatic.   Eyes: Conjunctivae are normal.   Neck: Neck supple. No JVD present.   Cardiovascular: Normal rate, regular rhythm and normal heart sounds.    Pulmonary/Chest: Effort normal.   tachypnea   Musculoskeletal: She exhibits no edema.   Neurological:   Speech is clear. Patient is appropriate and cooperative.     Skin: Skin is warm and dry. No rash noted.          VS T. 97.8 O2 92% /92     Assessment/Plan:   CXR: no acute cardiopulmonary process by my read, radiology pending.  Pulse ox adequate    1. Shortness of breath     2. Tachycardia       Radiology review of xray was pending as of visit  I recommended patient go to ER if O2 sat fall in 80's again and it appears that she has.

## 2017-10-31 NOTE — ASSESSMENT & PLAN NOTE
Likely secondary to demand ischemia setting of bronchitis and underlying TIMI  No active chest pain  Coronary angiography reveals no significant CAD

## 2017-10-31 NOTE — CARE PLAN
Problem: Nutritional:  Goal: Patient to verbalize or demonstrate understanding of diet  Outcome: MET Date Met: 10/31/17  Provided heart healthy diet education to pt and family per consult. Informational handout left w/ pt at bedside. Pt verbalized understanding of diet.     RD available prn

## 2017-10-31 NOTE — PROGRESS NOTES
Received bedside report from RN Serjio. Patient updated on plan of care. Patient observed and assessed. Patient resting well in bed in no immediate acute distress, bed locked and in lowest position with call bell in reach. Hourly rounding initiated.

## 2017-10-31 NOTE — ED PROVIDER NOTES
ED Provider Note      Primary care provider: Quynh Joel M.D.  Means of arrival: private vehicle  History obtained from: patient   History limited by: none    CHIEF COMPLAINT  Chief Complaint   Patient presents with   • Shortness of Breath     x 2 days. had similar symptoms a month ago. worked up for PE w/negative result. was told that she might poss have pulmonary htn and sleep apnea.   • Chest Pain     left side of chest. had positive troponin a month ago.       SANDRA Archuleta is a 37 y.o. female who presents to the Emergency Department for SOB. Patient reports that one month ago she had exertional dyspnea associated with occasional chest pain. She was admitted to the hospital for elevated troponin And subsequent echocardiogram and CTA of the chest were unremarkable. Patient reports that upon discharge her symptoms have resolved, however she started to have exertional dyspnea again 3 days ago. She reports that she does have occasional left-sided chest pain that is intermittent, however currently she is chest pain-free. Associated with the dyspnea she reports palpitations and dry cough. She denies fevers, chills, nausea, vomiting, and abdominal pain. Prior to this patient was healthy. There is no family history of autoimmune disorders. Father has a history of hypertension and blood clots, no cardiac disease.    REVIEW OF SYSTEMS  Review of Systems   Constitutional: Negative for chills and fever.   Respiratory: Positive for cough and shortness of breath.    Cardiovascular: Positive for chest pain.   Gastrointestinal: Negative for nausea and vomiting.   Genitourinary: Negative for dysuria.   Musculoskeletal: Negative for neck pain.   Neurological: Negative for dizziness and headaches.   All other systems reviewed and are negative.    PAST MEDICAL HISTORY   has a past medical history of Anxiety and depression (2/12/2015); Chickenpox; Chronic diarrhea (2/12/2015); and Ocular migraine (2/12/2015).    SURGICAL  "HISTORY   has a past surgical history that includes arthroscopy, knee; tonsillectomy; and primary c section (11/13/2016).    SOCIAL HISTORY  Social History   Substance Use Topics   • Smoking status: Never Smoker   • Smokeless tobacco: Never Used   • Alcohol use Yes      Comment: once every 3 weeks      History   Drug Use No       FAMILY HISTORY  Family History   Problem Relation Age of Onset   • Sleep Apnea Mother    • Hypertension Father    • Heart Disease Father    • DVT Father        CURRENT MEDICATIONS  Home Medications     Reviewed by Britni Catalan (Pharmacy Tech) on 10/30/17 at 1900  Med List Status: Complete   Medication Last Dose Status   fluoxetine (PROZAC) 40 MG capsule 10/30/2017 Active   levonorgestrel-ethinyl estradiol (TRIVORA, 28,) per tablet 10/30/2017 Active                ALLERGIES  Allergies   Allergen Reactions   • Erythromycin      Pt reports \" crying for no reason, and my jaw locked years ago\"    • Pcn [Penicillins] Hives       PHYSICAL EXAM  VITAL SIGNS: /83   Pulse 90   Temp 37.6 °C (99.6 °F)   Resp 18   Ht 1.702 m (5' 7\")   Wt 117.8 kg (259 lb 11.2 oz)   SpO2 94%   BMI 40.68 kg/m²   Vitals reviewed by myself.  Physical Exam   Constitutional: She is well-developed, well-nourished, and in no distress.   HENT:   Head: Normocephalic.   Eyes: EOM are normal.   Neck: Normal range of motion.   Cardiovascular: Normal rate, regular rhythm and normal heart sounds.  Exam reveals no gallop and no friction rub.    No murmur heard.  Pulmonary/Chest: Effort normal. No respiratory distress. She has no wheezes. She has no rales.   Abdominal: Soft. She exhibits no distension. There is no tenderness. There is no rebound.   Musculoskeletal: Normal range of motion.       DIAGNOSTIC STUDIES /  LABS  Labs Reviewed   TROPONIN - Abnormal; Notable for the following:        Result Value    Troponin I 0.95 (*)     All other components within normal limits    Narrative:     Indicate which " anticoagulants the patient is on:->UNKNOWN   BTYPE NATRIURETIC PEPTIDE - Abnormal; Notable for the following:     B Natriuretic Peptide 209 (*)     All other components within normal limits    Narrative:     Indicate which anticoagulants the patient is on:->UNKNOWN   CBC WITH DIFFERENTIAL - Abnormal; Notable for the following:     WBC 18.0 (*)     MCHC 33.4 (*)     Neutrophils-Polys 86.60 (*)     Lymphocytes 7.70 (*)     Immature Granulocytes 1.20 (*)     Neutrophils (Absolute) 15.59 (*)     Immature Granulocytes (abs) 0.21 (*)     All other components within normal limits    Narrative:     Indicate which anticoagulants the patient is on:->UNKNOWN   COMP METABOLIC PANEL - Abnormal; Notable for the following:     Co2 18 (*)     Anion Gap 14.0 (*)     Glucose 106 (*)     Bun 6 (*)     All other components within normal limits    Narrative:     Indicate which anticoagulants the patient is on:->UNKNOWN   APTT - Abnormal; Notable for the following:     APTT 23.8 (*)     All other components within normal limits    Narrative:     Indicate which anticoagulants the patient is on:->UNKNOWN   LIPASE - Abnormal; Notable for the following:     Lipase 3 (*)     All other components within normal limits    Narrative:     Indicate which anticoagulants the patient is on:->UNKNOWN   TROPONIN - Abnormal; Notable for the following:     Troponin I 1.54 (*)     All other components within normal limits   TROPONIN - Abnormal; Notable for the following:     Troponin I 1.55 (*)     All other components within normal limits   PROTHROMBIN TIME    Narrative:     Indicate which anticoagulants the patient is on:->UNKNOWN   ESTIMATED GFR    Narrative:     Indicate which anticoagulants the patient is on:->UNKNOWN   TSH   LACTIC ACID   URINALYSIS   URINE CULTURE(NEW)   TROPONIN   BASIC METABOLIC PANEL   CBC WITHOUT DIFFERENTIAL   PROTHROMBIN TIME      All labs reviewed by me.    EKG Interpretation:  Interpreted by myself    12 Lead EKG interpreted  by me to show:  Time: 1616  Normal sinus tachycardia  Rate 100  Axis: Normal  Intervals: Normal  Normal T waves  Normal ST segments  My impression of this EKG: Does not indicate ischemia or arrhythmia at this time.    RADIOLOGY  CT-CTA CHEST PULMONARY ARTERY W/ RECONS   Final Result      1.  No evidence of pulmonary embolus.      2.  Mild diffuse bronchial wall thickening suggesting bronchitis      3.  Left lower lobe calcified nodules suggesting old granulomatous disease.            DX-CHEST-LIMITED (1 VIEW)   Final Result      No evidence of acute cardiopulmonary process.      DX-CHEST-2 VIEWS    (Results Pending)     The radiologist's interpretation of all radiological studies have been reviewed by me.      COURSE & MEDICAL DECISION MAKING  Nursing notes, VS, PMSFHx reviewed in chart.    Patient is a 37-year-old female who comes in for shortness of breath and chest pain. Different diagnosis includes arrhythmia, acute coronary syndrome, viral syndrome, bronchitis, pneumonia, pulmonary embolus. Diagnostic workup includes labs, chest x-ray, EKG and CT of the chest.    The patient's initial vitals are within normal limits. EKG returns demonstrates no evidence of arrhythmia or ischemia. Patient's pain is managed with Tylenol after which she feels improved. Labs returned and are remarkable for a troponin of 0.95 which is increased from patient's troponin last month of 0.13. I discussed the case with Dr. Purcell (cardiologist) who advises given recent normal echo and EKG that this is unlikely cardiac in nature, he advises to continue work-up and re-consult as needed. Chest x-ray returns demonstrates no acute cardio pulmonary processes. Labs returned and aside from elevated troponin notable for leukocytosis with white blood cell count of 18. Given recent cough symptoms she is empirically treated with ceftriaxone and azithromycin for possible community-acquired pneumonia. CTA of the chest returns and demonstrates no  evidence of bony embolus, there is evidence of bronchial wall thickening consistent with possible bronchitis. Given elevated troponin and will admit patient for further workup. I discussed the case with Dr. Duffy accepted the admission. At time of admission patient is in stable condition.    FINAL IMPRESSION  1. Elevated troponin    2. SOB (shortness of breath)    3. Cough

## 2017-11-01 ENCOUNTER — PATIENT OUTREACH (OUTPATIENT)
Dept: HEALTH INFORMATION MANAGEMENT | Facility: OTHER | Age: 37
End: 2017-11-01

## 2017-11-01 VITALS
DIASTOLIC BLOOD PRESSURE: 61 MMHG | SYSTOLIC BLOOD PRESSURE: 135 MMHG | OXYGEN SATURATION: 94 % | BODY MASS INDEX: 42.08 KG/M2 | HEART RATE: 55 BPM | HEIGHT: 67 IN | RESPIRATION RATE: 16 BRPM | TEMPERATURE: 97.6 F | WEIGHT: 268.08 LBS

## 2017-11-01 PROBLEM — R79.89 ELEVATED TROPONIN: Status: RESOLVED | Noted: 2017-10-30 | Resolved: 2017-11-01

## 2017-11-01 PROBLEM — J40 BRONCHITIS: Status: RESOLVED | Noted: 2017-10-30 | Resolved: 2017-11-01

## 2017-11-01 LAB
ANION GAP SERPL CALC-SCNC: 7 MMOL/L (ref 0–11.9)
BASOPHILS # BLD AUTO: 0.5 % (ref 0–1.8)
BASOPHILS # BLD: 0.05 K/UL (ref 0–0.12)
BUN SERPL-MCNC: 9 MG/DL (ref 8–22)
CALCIUM SERPL-MCNC: 8.2 MG/DL (ref 8.5–10.5)
CHLORIDE SERPL-SCNC: 110 MMOL/L (ref 96–112)
CO2 SERPL-SCNC: 22 MMOL/L (ref 20–33)
CREAT SERPL-MCNC: 0.58 MG/DL (ref 0.5–1.4)
EOSINOPHIL # BLD AUTO: 0.53 K/UL (ref 0–0.51)
EOSINOPHIL NFR BLD: 5.7 % (ref 0–6.9)
ERYTHROCYTE [DISTWIDTH] IN BLOOD BY AUTOMATED COUNT: 47 FL (ref 35.9–50)
GFR SERPL CREATININE-BSD FRML MDRD: >60 ML/MIN/1.73 M 2
GLUCOSE SERPL-MCNC: 86 MG/DL (ref 65–99)
HCT VFR BLD AUTO: 35.3 % (ref 37–47)
HGB BLD-MCNC: 11.1 G/DL (ref 12–16)
IMM GRANULOCYTES # BLD AUTO: 0.03 K/UL (ref 0–0.11)
IMM GRANULOCYTES NFR BLD AUTO: 0.3 % (ref 0–0.9)
LYMPHOCYTES # BLD AUTO: 2.11 K/UL (ref 1–4.8)
LYMPHOCYTES NFR BLD: 22.7 % (ref 22–41)
MCH RBC QN AUTO: 29.8 PG (ref 27–33)
MCHC RBC AUTO-ENTMCNC: 31.4 G/DL (ref 33.6–35)
MCV RBC AUTO: 94.6 FL (ref 81.4–97.8)
MONOCYTES # BLD AUTO: 0.71 K/UL (ref 0–0.85)
MONOCYTES NFR BLD AUTO: 7.6 % (ref 0–13.4)
NEUTROPHILS # BLD AUTO: 5.86 K/UL (ref 2–7.15)
NEUTROPHILS NFR BLD: 63.2 % (ref 44–72)
NRBC # BLD AUTO: 0 K/UL
NRBC BLD AUTO-RTO: 0 /100 WBC
PLATELET # BLD AUTO: 230 K/UL (ref 164–446)
PMV BLD AUTO: 10.3 FL (ref 9–12.9)
POTASSIUM SERPL-SCNC: 4.2 MMOL/L (ref 3.6–5.5)
RBC # BLD AUTO: 3.73 M/UL (ref 4.2–5.4)
SODIUM SERPL-SCNC: 139 MMOL/L (ref 135–145)
WBC # BLD AUTO: 9.3 K/UL (ref 4.8–10.8)

## 2017-11-01 PROCEDURE — 700105 HCHG RX REV CODE 258: Performed by: HOSPITALIST

## 2017-11-01 PROCEDURE — 36415 COLL VENOUS BLD VENIPUNCTURE: CPT

## 2017-11-01 PROCEDURE — 80048 BASIC METABOLIC PNL TOTAL CA: CPT

## 2017-11-01 PROCEDURE — A9270 NON-COVERED ITEM OR SERVICE: HCPCS | Performed by: INTERNAL MEDICINE

## 2017-11-01 PROCEDURE — 99239 HOSP IP/OBS DSCHRG MGMT >30: CPT | Performed by: INTERNAL MEDICINE

## 2017-11-01 PROCEDURE — 700102 HCHG RX REV CODE 250 W/ 637 OVERRIDE(OP): Performed by: INTERNAL MEDICINE

## 2017-11-01 PROCEDURE — A9270 NON-COVERED ITEM OR SERVICE: HCPCS | Performed by: HOSPITALIST

## 2017-11-01 PROCEDURE — 85025 COMPLETE CBC W/AUTO DIFF WBC: CPT

## 2017-11-01 PROCEDURE — 700102 HCHG RX REV CODE 250 W/ 637 OVERRIDE(OP): Performed by: HOSPITALIST

## 2017-11-01 RX ORDER — POTASSIUM CHLORIDE 20 MEQ/1
10 TABLET, EXTENDED RELEASE ORAL DAILY
Status: DISCONTINUED | OUTPATIENT
Start: 2017-11-01 | End: 2017-11-01 | Stop reason: HOSPADM

## 2017-11-01 RX ORDER — FUROSEMIDE 20 MG/1
20 TABLET ORAL
Status: DISCONTINUED | OUTPATIENT
Start: 2017-11-01 | End: 2017-11-01 | Stop reason: HOSPADM

## 2017-11-01 RX ORDER — FUROSEMIDE 20 MG/1
20 TABLET ORAL DAILY
Qty: 60 TAB | Refills: 0 | Status: SHIPPED | OUTPATIENT
Start: 2017-11-01 | End: 2018-03-01

## 2017-11-01 RX ORDER — POTASSIUM CHLORIDE 750 MG/1
10 TABLET, EXTENDED RELEASE ORAL DAILY
Qty: 60 TAB | Refills: 1 | Status: SHIPPED | OUTPATIENT
Start: 2017-11-01 | End: 2018-03-01

## 2017-11-01 RX ADMIN — POTASSIUM CHLORIDE 10 MEQ: 1500 TABLET, EXTENDED RELEASE ORAL at 11:37

## 2017-11-01 RX ADMIN — ALBUTEROL SULFATE 2 PUFF: 90 AEROSOL, METERED RESPIRATORY (INHALATION) at 02:55

## 2017-11-01 RX ADMIN — FLUOXETINE HYDROCHLORIDE 80 MG: 20 CAPSULE ORAL at 08:39

## 2017-11-01 RX ADMIN — FUROSEMIDE 20 MG: 20 TABLET ORAL at 11:37

## 2017-11-01 RX ADMIN — SODIUM CHLORIDE: 9 INJECTION, SOLUTION INTRAVENOUS at 06:29

## 2017-11-01 RX ADMIN — ASPIRIN 325 MG: 325 TABLET, COATED ORAL at 08:40

## 2017-11-01 RX ADMIN — ALBUTEROL SULFATE 2 PUFF: 90 AEROSOL, METERED RESPIRATORY (INHALATION) at 06:29

## 2017-11-01 RX ADMIN — ALBUTEROL SULFATE 2 PUFF: 90 AEROSOL, METERED RESPIRATORY (INHALATION) at 10:00

## 2017-11-01 ASSESSMENT — ENCOUNTER SYMPTOMS
HEMOPTYSIS: 0
FEVER: 0
SHORTNESS OF BREATH: 0
BRUISES/BLEEDS EASILY: 0

## 2017-11-01 ASSESSMENT — PAIN SCALES - GENERAL
PAINLEVEL_OUTOF10: 0
PAINLEVEL_OUTOF10: 0

## 2017-11-01 ASSESSMENT — LIFESTYLE VARIABLES: EVER_SMOKED: NEVER

## 2017-11-01 NOTE — PROGRESS NOTES
Bedside report received from day shift RN, assumed pt care. Pt assessment complete, with no signs of distress. Right radial site is clean dry and intact with dressing in place, hemoband at bedside, pt and family educated on calling for any signs of bleeding or drainage. Pt denies any pain at this time. Pt A&Ox4. Reviewed plan of care with pt and family. Tele box on and rhythm verified. Pt on o2 at 2 liters. Chart and labs reviewed.  Bed in lowest position, call light within reach. Hourly rounding in place. Educated about calling for assistance.

## 2017-11-01 NOTE — PROGRESS NOTES
Bedside shift report completed. No needs at this time. Bed low locked, SRx2, call bell within reach, Non skid socks on.

## 2017-11-01 NOTE — DISCHARGE INSTRUCTIONS
Discharge Instructions    Discharged to home by car with relative. Discharged via wheelchair, hospital escort: Yes.  Special equipment needed: Not Applicable    Be sure to schedule a follow-up appointment with your primary care doctor or any specialists as instructed.     Discharge Plan:   Diet Plan: Discussed  Activity Level: Discussed  Confirmed Follow up Appointment: Appointment Scheduled  Confirmed Symptoms Management: Discussed  Medication Reconciliation Updated: Yes  Influenza Vaccine Indication: Patient Refuses    I understand that a diet low in cholesterol, fat, and sodium is recommended for good health. Unless I have been given specific instructions below for another diet, I accept this instruction as my diet prescription.   Other diet: Cardiac diet    Special Instructions: None        Depression / Suicide Risk    As you are discharged from this [facility] facility, it is important to learn how to keep safe from harming yourself.    Recognize the warning signs:  · Abrupt changes in personality, positive or negative- including increase in energy   · Giving away possessions  · Change in eating patterns- significant weight changes-  positive or negative  · Change in sleeping patterns- unable to sleep or sleeping all the time   · Unwillingness or inability to communicate  · Depression  · Unusual sadness, discouragement and loneliness  · Talk of wanting to die  · Neglect of personal appearance   · Rebelliousness- reckless behavior  · Withdrawal from people/activities they love  · Confusion- inability to concentrate     If you or a loved one observes any of these behaviors or has concerns about self-harm, here's what you can do:  · Talk about it- your feelings and reasons for harming yourself  · Remove any means that you might use to hurt yourself (examples: pills, rope, extension cords, firearm)  · Get professional help from the community (Mental Health, Substance Abuse, psychological counseling)  · Do not be  alone:Call your Safe Contact- someone whom you trust who will be there for you.  · Call your local CRISIS HOTLINE 937-1593 or 787-838-0297  · Call your local Children's Mobile Crisis Response Team [place] (629) 399-5731 or www.[employer]  · Call the toll free National Suicide Prevention Hotlines   · National Suicide Prevention Lifeline 588-033-GWWW (0976)  · National Hope Line Network 800-SUICIDE (322-9180)      Pulmonary Hypertension  Pulmonary hypertension is high blood pressure within the arteries in your lungs (pulmonary arteries). It is different than having high blood pressure elsewhere in your body, such as blood pressure that is measured with a blood pressure cuff. Pulmonary hypertension makes it harder for blood to flow through the lungs. As a result, the heart must work harder to pump blood through the lungs, and it may be harder for you to breathe. Over time, this can weaken the heart muscle. Pulmonary hypertension is a serious condition and it can be fatal.   CAUSES  Many different medical conditions can cause pulmonary hypertension. Pulmonary hypertension can be categorized by cause into five groups:  Group 1  Pulmonary hypertension that is caused by abnormal growth of small blood vessels in the lungs (pulmonary arterial hypertension). The abnormal blood vessel growth may have no known cause, or it may be:  · Passed along from a parent (hereditary).  · Caused by another disease, such as a connective tissue disease (including lupus or scleroderma) or HIV.  · Caused by certain drugs or toxins.  Group 2  Pulmonary hypertension that is caused by weakness of the main chamber of the heart (left ventricle) or heart valve disease.  Group 3  Pulmonary hypertension that is caused by lung disease or low oxygen levels. Causes in this group include:  · Emphysema or chronic obstructive pulmonary disease (COPD).  · Untreated sleep apnea.  · Pulmonary fibrosis.  Group 4  Pulmonary hypertension that is  caused by blood clots in the lungs (pulmonary emboli).   Group 5  Other causes of pulmonary hypertension, such as sickle cell anemia, or a mix of multiple causes.   SYMPTOMS  Symptoms of this condition include:  · Shortness of breath. You may notice shortness of breath with:  ¨ Activity, such as walking.  ¨ No activity.  · Tiredness and fatigue.  · Dizziness or fainting.  · Rapid heartbeat or feeling your heart flutter or skip a beat (palpitations).  · Neck vein enlargement.  · Bluish color to your lips and fingertips.  DIAGNOSIS  This condition may be diagnosed by:  · Chest X-ray.  · Arterial blood gases. This test checks the acidity of your blood as well as your blood oxygen and carbon dioxide levels.  · CT scan. This test can provide detailed images of your lungs.  · Pulmonary function test. This test measures how much air your lungs can hold. It also tests how well air moves in and out of your lungs.  · Electrocardiogram (ECG). This test traces the electrical activity of your heart.  · Echocardiogram. This test is used to look at your heart in motion and check how it is functioning.  · Heart catheterization. This test can measure the pressure in your pulmonary artery and the right side of your heart.  · Lung biopsy. This procedure involves checking a sample of lung tissue to find underlying causes.  TREATMENT  There is no cure for pulmonary hypertension, but treatment can help to relieve symptoms and slow the progress of the condition. Treatment can involve:  · Medicines, such as:  ¨ Blood pressure medicines.  ¨ Medicines to relax (dilate) the pulmonary blood vessels.  ¨ Water pills to get rid of extra fluid (diuretic medicines).  ¨ Blood-thinning medicines.  · Surgery. For severe pulmonary hypertension that does not respond to medical treatment, heart-lung or lung transplant may be needed.  HOME CARE INSTRUCTIONS  · Take medicines only as directed by your health care provider. These include over-the-counter  medicines and prescription medicines. Take all medicines exactly as instructed. Do not change or stop medicines without first checking with your health care provider.  · Do not smoke. If you need help quitting, ask your health care provider.  · Eat a healthy diet.  · Limit your salt (sodium) intake to less than 2,300 mg per day.  · Stay as active as possible. Exercise as directed by your health care provider. Talk with your health care provider about what type of exercise is safe for you.  · Avoid high altitudes.  · Avoid hot tubs and saunas.  · Avoid becoming pregnant, if this applies. Talk with your health care provider about safe methods of birth control.  · Keep all follow-up visits as directed by your health care provider. This is important.  SEEK IMMEDIATE MEDICAL CARE IF:  · You have severe shortness of breath.  · You develop chest pain or pressure in your chest.  · You cough up blood.  · You develop swelling of your feet or legs.  · You have a significant increase in weight within 1-2 days.     This information is not intended to replace advice given to you by your health care provider. Make sure you discuss any questions you have with your health care provider.     Document Released: 10/14/2008 Document Revised: 05/03/2016 Document Reviewed: 06/09/2014  [employer] Interactive Patient Education ©2016 [employer]      Coronary Angiogram  A coronary angiogram, also called coronary angiography, is an X-ray procedure used to look at the arteries in the heart. In this procedure, a dye (contrast dye) is injected through a long, hollow tube (catheter). The catheter is about the size of a piece of cooked spaghetti and is inserted through your groin, wrist, or arm. The dye is injected into each artery, and X-rays are then taken to show if there is a blockage in the arteries of your heart.  LET YOUR HEALTH CARE PROVIDER KNOW ABOUT:  · Any allergies you have, including allergies to shellfish or contrast dye.    · All  medicines you are taking, including vitamins, herbs, eye drops, creams, and over-the-counter medicines.    · Previous problems you or members of your family have had with the use of anesthetics.    · Any blood disorders you have.    · Previous surgeries you have had.  · History of kidney problems or failure.    · Other medical conditions you have.  RISKS AND COMPLICATIONS   Generally, a coronary angiogram is a safe procedure. However, problems can occur and include:  · Allergic reaction to the dye.  · Bleeding from the access site or other locations.  · Kidney injury, especially in people with impaired kidney function.   · Stroke (rare).  · Heart attack (rare).  BEFORE THE PROCEDURE   · Do not eat or drink anything after midnight the night before the procedure or as directed by your health care provider.    · Ask your health care provider about changing or stopping your regular medicines. This is especially important if you are taking diabetes medicines or blood thinners.  PROCEDURE  · You may be given a medicine to help you relax (sedative) before the procedure. This medicine is given through an intravenous (IV) access tube that is inserted into one of your veins.    · The area where the catheter will be inserted will be washed and shaved. This is usually done in the groin but may be done in the fold of your arm (near your elbow) or in the wrist.     · A medicine will be given to numb the area where the catheter will be inserted (local anesthetic).    · The health care provider will insert the catheter into an artery. The catheter will be guided by using a special type of X-ray (fluoroscopy) of the blood vessel being examined.    · A special dye will then be injected into the catheter, and X-rays will be taken. The dye will help to show where any narrowing or blockages are located in the heart arteries.    AFTER THE PROCEDURE   · If the procedure is done through the leg, you will be kept in bed lying flat for  several hours. You will be instructed to not bend or cross your legs.  · The insertion site will be checked frequently.    · The pulse in your feet or wrist will be checked frequently.    · Additional blood tests, X-rays, and an electrocardiogram may be done.       This information is not intended to replace advice given to you by your health care provider. Make sure you discuss any questions you have with your health care provider.     Document Released: 06/23/2004 Document Revised: 01/08/2016 Document Reviewed: 05/12/2014  [employer] Interactive Patient Education ©2016 [employer]

## 2017-11-01 NOTE — DISCHARGE SUMMARY
CHIEF COMPLAINT ON ADMISSION  Chief Complaint   Patient presents with   • Shortness of Breath     x 2 days. had similar symptoms a month ago. worked up for PE w/negative result. was told that she might poss have pulmonary htn and sleep apnea.   • Chest Pain     left side of chest. had positive troponin a month ago.       CODE STATUS  Full Code    HPI & HOSPITAL COURSE  Please review H&P for details on admission.   This is a 37 y.o. female Who presented with shortness of breath and chest discomfort. She was admitted in September for similar complaints where she underwent 2-D echo which revealed normal ejection fraction, CTA chest which was negative for PE and a negative cardiac stress test. She was set up for a sleep study which is still pending. She comes in again with similar complaints, a repeat CTA chest revealed no evidence of pulmonary embolus and mild diffuse bronchial wall thickening suggesting bronchitis. Cardiology was consulted and the patient underwent a cardiac catheterization that revealed no significant coronary artery disease and moderate pulmonary hypertension, elevated LVEDP and right diastolic pressure. Her pulmonary hypertension was thought secondary to her morbid obesity and underlying obstructive sleep apnea. Following day the patient's symptoms improved, she is able to ambulate on room air without difficulty or dyspnea. She was instructed to follow-up with her PCP. She was instructed to get a sleep study as outpatient.    The patient was instructed to return to the ER in the event of worsening symptoms. I have counseled the patient on the importance of compliance and the patient has agreed to proceed with all medical recommendations and follow up plan indicated above.      Therefore, she is discharged in good and stable condition with close outpatient follow-up.    SPECIFIC OUTPATIENT FOLLOW-UP  As above    DISCHARGE PROBLEM LIST  Active Problems:    Pulmonary hypertension POA: Yes  Resolved  Problems:    Elevated troponin POA: Yes    Bronchitis POA: Yes      FOLLOW UP  Future Appointments  Date Time Provider Department Center   11/10/2017 3:20 PM KELLY Coles [facility]   12/7/2017 7:00 PM SLEEP TECH [facility] None   12/21/2017 9:40 AM LINWOOD Fletcher [facility] None     No follow-up provider specified.    MEDICATIONS ON DISCHARGE   Modesta Archuleta   Home Medication Instructions TOBI:[id number]    Printed on:11/01/17 1413   Medication Information                      fluoxetine (PROZAC) 40 MG capsule  Take 80 mg by mouth every day.             furosemide (LASIX) 20 MG Tab  Take 1 Tab by mouth every day.             levonorgestrel-ethinyl estradiol (TRIVORA, 28,) per tablet  Take 1 Tab by mouth every day.             potassium chloride SA (K-DUR) 10 MEQ Tab CR  Take 1 Tab by mouth every day.                 DIET  Orders Placed This Encounter   Procedures   • Diet Order     Standing Status:   Standing     Number of Occurrences:   1     Order Specific Question:   Diet:     Answer:   Cardiac [6]     Order Specific Question:   Calorie modifications:     Answer:   1500 kcals [3]       ACTIVITY  As tolerated.  Weight bearing as tolerated      CONSULTATIONS  Cardiology     PROCEDURES  Cardiac cath    DX-CHEST-2 VIEWS   Final Result      Hypoinflation without other evidence for acute cardiopulmonary disease.      CT-CTA CHEST PULMONARY ARTERY W/ RECONS   Final Result      1.  No evidence of pulmonary embolus.      2.  Mild diffuse bronchial wall thickening suggesting bronchitis      3.  Left lower lobe calcified nodules suggesting old granulomatous disease.            DX-CHEST-LIMITED (1 VIEW)   Final Result      No evidence of acute cardiopulmonary process.            LABORATORY  Lab Results   Component Value Date/Time    SODIUM 139 11/01/2017 03:28 AM    POTASSIUM 4.2 11/01/2017 03:28 AM    CHLORIDE 110 11/01/2017 03:28 AM    CO2 22 11/01/2017 03:28 AM    GLUCOSE 86 11/01/2017 03:28 AM    BUN  9 11/01/2017 03:28 AM    CREATININE 0.58 11/01/2017 03:28 AM        Lab Results   Component Value Date/Time    WBC 9.3 11/01/2017 03:28 AM    HEMOGLOBIN 11.1 (L) 11/01/2017 03:28 AM    HEMATOCRIT 35.3 (L) 11/01/2017 03:28 AM    PLATELETCT 230 11/01/2017 03:28 AM        Total time of the discharge process exceeds 32 minutes

## 2017-11-01 NOTE — PROGRESS NOTES
Cardiology Progress Note               Author: Troy Lockett Date & Time created: 2017  10:47 AM     Interval History:  The patient was readmitted last evening with another episode of dyspnea. She was hospitalized here in early September with similar complaints.  Angiography yesterday revealed no evidence of significant coronary artery disease. There is moderate pulmonary hypertension and elevated LVEDP and right diastolic pressure.  No hematoma at the arterial and venous puncture sites.  Review of Systems   Constitutional: Negative for fever.   Respiratory: Negative for hemoptysis and shortness of breath.    Cardiovascular: Negative for chest pain.   Endo/Heme/Allergies: Does not bruise/bleed easily.       Physical Exam   Constitutional: She is oriented to person, place, and time. No distress.   HENT:   Head: Normocephalic and atraumatic.   Cardiovascular: Normal rate and regular rhythm.    No murmur heard.  Pulmonary/Chest: No respiratory distress. She has no wheezes.   Neurological: She is alert and oriented to person, place, and time.   Skin: Skin is warm and dry.       Hemodynamics:  Temp (24hrs), Av.4 °C (97.5 °F), Min:36.2 °C (97.2 °F), Max:36.6 °C (97.9 °F)  Temperature: 36.3 °C (97.3 °F)  Pulse  Av.4  Min: 46  Max: 119   Blood Pressure: 132/80     Respiratory:    Respiration: 16, Pulse Oximetry: 96 %           Fluids:     Weight: 121.6 kg (268 lb 1.3 oz)  GI/Nutrition:  Orders Placed This Encounter   Procedures   • Diet Order     Standing Status:   Standing     Number of Occurrences:   1     Order Specific Question:   Diet:     Answer:   Cardiac [6]     Order Specific Question:   Calorie modifications:     Answer:   1500 kcals [3]     Lab Results:  Recent Labs      10/30/17   2231  10/31/17   0248  17   0328   WBC  16.9*  16.5*  9.3   RBC  4.15*  4.25  3.73*   HEMOGLOBIN  12.3  12.7  11.1*   HEMATOCRIT  37.3  38.8  35.3*   MCV  89.9  91.3  94.6   MCH  29.6  29.9  29.8   MCHC  33.0*   32.7*  31.4*   RDW  43.9  45.4  47.0   PLATELETCT  262  288  230   MPV  10.2  10.3  10.3     Recent Labs      10/30/17   2231  10/31/17   0248  11/01/17   0328   SODIUM  139  139  139   POTASSIUM  3.7  3.8  4.2   CHLORIDE  105  106  110   CO2  25  25  22   GLUCOSE  118*  106*  86   BUN  6*  7*  9   CREATININE  0.71  1.03  0.58   CALCIUM  8.5  8.6  8.2*     Recent Labs      10/30/17   1605  10/30/17   2231   APTT  23.8*   --    INR  0.98  1.05     Recent Labs      10/30/17   1605   BNPBTYPENAT  209*     Recent Labs      10/30/17   1605  10/30/17   1811  10/30/17   1840  10/30/17   2231   TROPONINI  0.95*  1.54*  1.55*  1.53*   BNPBTYPENAT  209*   --    --    --              Medical Decision Making, by Problem:  Active Hospital Problems    Diagnosis   • Elevated troponin [R74.8]   • SIRS (systemic inflammatory response syndrome) (CMS-HCC) [R65.10]       Plan:  The patient has moderate pulmonary hypertension and elevated LVEDP. I suspect this is secondary to combination of obesity and untreated sleep apnea. This was discussed with patient. Recommended she be started on daily furosemide and potassium replacement.  No evidence of significant coronary disease. She can be discharged today to follow-up as an outpatient. We'll sign off at this point.      Quality-Core Measures

## 2017-11-02 ENCOUNTER — PATIENT OUTREACH (OUTPATIENT)
Dept: HEALTH INFORMATION MANAGEMENT | Facility: OTHER | Age: 37
End: 2017-11-02

## 2017-11-02 LAB
BACTERIA UR CULT: NORMAL
SIGNIFICANT IND 70042: NORMAL
SITE SITE: NORMAL
SOURCE SOURCE: NORMAL

## 2017-11-02 NOTE — PROGRESS NOTES
Placed discharge outreach phone call to patient s/p hospital discharge 11/1/17.  Left voicemail providing my contact information and instructions to call with any questions or concerns.

## 2017-11-05 ENCOUNTER — RESOLUTE PROFESSIONAL BILLING HOSPITAL PROF FEE (OUTPATIENT)
Dept: HOSPITALIST | Facility: MEDICAL CENTER | Age: 37
End: 2017-11-05
Payer: COMMERCIAL

## 2017-11-05 ENCOUNTER — HOSPITAL ENCOUNTER (OUTPATIENT)
Facility: MEDICAL CENTER | Age: 37
End: 2017-11-06
Attending: EMERGENCY MEDICINE | Admitting: INTERNAL MEDICINE
Payer: COMMERCIAL

## 2017-11-05 ENCOUNTER — PATIENT OUTREACH (OUTPATIENT)
Dept: HEALTH INFORMATION MANAGEMENT | Facility: OTHER | Age: 37
End: 2017-11-05

## 2017-11-05 ENCOUNTER — APPOINTMENT (OUTPATIENT)
Dept: RADIOLOGY | Facility: MEDICAL CENTER | Age: 37
End: 2017-11-05
Attending: EMERGENCY MEDICINE
Payer: COMMERCIAL

## 2017-11-05 DIAGNOSIS — R09.02 HYPOXIA: ICD-10-CM

## 2017-11-05 DIAGNOSIS — I27.20 PULMONARY HYPERTENSION (HCC): ICD-10-CM

## 2017-11-05 DIAGNOSIS — R06.02 SHORTNESS OF BREATH: ICD-10-CM

## 2017-11-05 PROBLEM — D72.829 LEUKOCYTOSIS: Status: ACTIVE | Noted: 2017-11-05

## 2017-11-05 LAB
ANION GAP SERPL CALC-SCNC: 9 MMOL/L (ref 0–11.9)
BASOPHILS # BLD AUTO: 0.3 % (ref 0–1.8)
BASOPHILS # BLD: 0.05 K/UL (ref 0–0.12)
BNP SERPL-MCNC: 180 PG/ML (ref 0–100)
BUN SERPL-MCNC: 11 MG/DL (ref 8–22)
CALCIUM SERPL-MCNC: 9.1 MG/DL (ref 8.5–10.5)
CHLORIDE SERPL-SCNC: 103 MMOL/L (ref 96–112)
CO2 SERPL-SCNC: 25 MMOL/L (ref 20–33)
CREAT SERPL-MCNC: 0.99 MG/DL (ref 0.5–1.4)
EKG IMPRESSION: NORMAL
EOSINOPHIL # BLD AUTO: 0.18 K/UL (ref 0–0.51)
EOSINOPHIL NFR BLD: 1.1 % (ref 0–6.9)
ERYTHROCYTE [DISTWIDTH] IN BLOOD BY AUTOMATED COUNT: 42.1 FL (ref 35.9–50)
GFR SERPL CREATININE-BSD FRML MDRD: >60 ML/MIN/1.73 M 2
GLUCOSE SERPL-MCNC: 104 MG/DL (ref 65–99)
HCG SERPL QL: NEGATIVE
HCT VFR BLD AUTO: 40.9 % (ref 37–47)
HGB BLD-MCNC: 13.6 G/DL (ref 12–16)
IMM GRANULOCYTES # BLD AUTO: 0.08 K/UL (ref 0–0.11)
IMM GRANULOCYTES NFR BLD AUTO: 0.5 % (ref 0–0.9)
LYMPHOCYTES # BLD AUTO: 1.55 K/UL (ref 1–4.8)
LYMPHOCYTES NFR BLD: 9.4 % (ref 22–41)
MAGNESIUM SERPL-MCNC: 1.8 MG/DL (ref 1.5–2.5)
MCH RBC QN AUTO: 29.8 PG (ref 27–33)
MCHC RBC AUTO-ENTMCNC: 33.3 G/DL (ref 33.6–35)
MCV RBC AUTO: 89.7 FL (ref 81.4–97.8)
MONOCYTES # BLD AUTO: 0.69 K/UL (ref 0–0.85)
MONOCYTES NFR BLD AUTO: 4.2 % (ref 0–13.4)
NEUTROPHILS # BLD AUTO: 13.92 K/UL (ref 2–7.15)
NEUTROPHILS NFR BLD: 84.5 % (ref 44–72)
NRBC # BLD AUTO: 0 K/UL
NRBC BLD AUTO-RTO: 0 /100 WBC
PHOSPHATE SERPL-MCNC: 4.3 MG/DL (ref 2.5–4.5)
PLATELET # BLD AUTO: 366 K/UL (ref 164–446)
PMV BLD AUTO: 9.7 FL (ref 9–12.9)
POTASSIUM SERPL-SCNC: 3.9 MMOL/L (ref 3.6–5.5)
RBC # BLD AUTO: 4.56 M/UL (ref 4.2–5.4)
SODIUM SERPL-SCNC: 137 MMOL/L (ref 135–145)
TROPONIN I SERPL-MCNC: <0.01 NG/ML (ref 0–0.04)
WBC # BLD AUTO: 16.5 K/UL (ref 4.8–10.8)

## 2017-11-05 PROCEDURE — 85025 COMPLETE CBC W/AUTO DIFF WBC: CPT

## 2017-11-05 PROCEDURE — 93005 ELECTROCARDIOGRAM TRACING: CPT

## 2017-11-05 PROCEDURE — 83880 ASSAY OF NATRIURETIC PEPTIDE: CPT

## 2017-11-05 PROCEDURE — 84484 ASSAY OF TROPONIN QUANT: CPT

## 2017-11-05 PROCEDURE — 83735 ASSAY OF MAGNESIUM: CPT

## 2017-11-05 PROCEDURE — 84703 CHORIONIC GONADOTROPIN ASSAY: CPT

## 2017-11-05 PROCEDURE — G0378 HOSPITAL OBSERVATION PER HR: HCPCS

## 2017-11-05 PROCEDURE — 99285 EMERGENCY DEPT VISIT HI MDM: CPT

## 2017-11-05 PROCEDURE — 80048 BASIC METABOLIC PNL TOTAL CA: CPT

## 2017-11-05 PROCEDURE — 96372 THER/PROPH/DIAG INJ SC/IM: CPT

## 2017-11-05 PROCEDURE — 700111 HCHG RX REV CODE 636 W/ 250 OVERRIDE (IP): Performed by: INTERNAL MEDICINE

## 2017-11-05 PROCEDURE — 71010 DX-CHEST-PORTABLE (1 VIEW): CPT

## 2017-11-05 PROCEDURE — 84100 ASSAY OF PHOSPHORUS: CPT

## 2017-11-05 PROCEDURE — 99220 PR INITIAL OBSERVATION CARE,LEVL III: CPT | Performed by: INTERNAL MEDICINE

## 2017-11-05 PROCEDURE — 93005 ELECTROCARDIOGRAM TRACING: CPT | Performed by: EMERGENCY MEDICINE

## 2017-11-05 RX ORDER — FUROSEMIDE 20 MG/1
20 TABLET ORAL DAILY
Status: DISCONTINUED | OUTPATIENT
Start: 2017-11-06 | End: 2017-11-06 | Stop reason: HOSPADM

## 2017-11-05 RX ORDER — BISACODYL 10 MG
10 SUPPOSITORY, RECTAL RECTAL
Status: DISCONTINUED | OUTPATIENT
Start: 2017-11-05 | End: 2017-11-06 | Stop reason: HOSPADM

## 2017-11-05 RX ORDER — AMIODARONE HYDROCHLORIDE 150 MG/3ML
INJECTION, SOLUTION INTRAVENOUS
Status: DISPENSED
Start: 2017-11-05 | End: 2017-11-06

## 2017-11-05 RX ORDER — FLUOXETINE HYDROCHLORIDE 20 MG/1
80 CAPSULE ORAL DAILY
Status: DISCONTINUED | OUTPATIENT
Start: 2017-11-06 | End: 2017-11-06 | Stop reason: HOSPADM

## 2017-11-05 RX ORDER — POTASSIUM CHLORIDE 20 MEQ/1
10 TABLET, EXTENDED RELEASE ORAL DAILY
Status: DISCONTINUED | OUTPATIENT
Start: 2017-11-06 | End: 2017-11-06 | Stop reason: HOSPADM

## 2017-11-05 RX ORDER — ONDANSETRON 4 MG/1
4 TABLET, ORALLY DISINTEGRATING ORAL EVERY 4 HOURS PRN
Status: DISCONTINUED | OUTPATIENT
Start: 2017-11-05 | End: 2017-11-06 | Stop reason: HOSPADM

## 2017-11-05 RX ORDER — PROMETHAZINE HYDROCHLORIDE 25 MG/1
12.5-25 SUPPOSITORY RECTAL EVERY 4 HOURS PRN
Status: DISCONTINUED | OUTPATIENT
Start: 2017-11-05 | End: 2017-11-06 | Stop reason: HOSPADM

## 2017-11-05 RX ORDER — PROMETHAZINE HYDROCHLORIDE 25 MG/1
12.5-25 TABLET ORAL EVERY 4 HOURS PRN
Status: DISCONTINUED | OUTPATIENT
Start: 2017-11-05 | End: 2017-11-06 | Stop reason: HOSPADM

## 2017-11-05 RX ORDER — LORAZEPAM 2 MG/ML
0.5 INJECTION INTRAMUSCULAR EVERY 4 HOURS PRN
Status: DISCONTINUED | OUTPATIENT
Start: 2017-11-05 | End: 2017-11-06 | Stop reason: HOSPADM

## 2017-11-05 RX ORDER — GUAIFENESIN AND DEXTROMETHORPHAN HYDROBROMIDE 1200; 60 MG/1; MG/1
1 TABLET, EXTENDED RELEASE ORAL 2 TIMES DAILY
COMMUNITY
End: 2017-11-08

## 2017-11-05 RX ORDER — AMOXICILLIN 250 MG
2 CAPSULE ORAL 2 TIMES DAILY
Status: DISCONTINUED | OUTPATIENT
Start: 2017-11-05 | End: 2017-11-06 | Stop reason: HOSPADM

## 2017-11-05 RX ORDER — GUAIFENESIN AND DEXTROMETHORPHAN HYDROBROMIDE 1200; 60 MG/1; MG/1
1 TABLET, EXTENDED RELEASE ORAL 2 TIMES DAILY
Status: DISCONTINUED | OUTPATIENT
Start: 2017-11-05 | End: 2017-11-05

## 2017-11-05 RX ORDER — ACETAMINOPHEN 325 MG/1
650 TABLET ORAL EVERY 6 HOURS PRN
Status: DISCONTINUED | OUTPATIENT
Start: 2017-11-05 | End: 2017-11-06 | Stop reason: HOSPADM

## 2017-11-05 RX ORDER — POLYETHYLENE GLYCOL 3350 17 G/17G
1 POWDER, FOR SOLUTION ORAL
Status: DISCONTINUED | OUTPATIENT
Start: 2017-11-05 | End: 2017-11-06 | Stop reason: HOSPADM

## 2017-11-05 RX ORDER — ONDANSETRON 2 MG/ML
4 INJECTION INTRAMUSCULAR; INTRAVENOUS EVERY 4 HOURS PRN
Status: DISCONTINUED | OUTPATIENT
Start: 2017-11-05 | End: 2017-11-06 | Stop reason: HOSPADM

## 2017-11-05 RX ADMIN — ENOXAPARIN SODIUM 40 MG: 100 INJECTION SUBCUTANEOUS at 21:13

## 2017-11-05 ASSESSMENT — ENCOUNTER SYMPTOMS
CONSTIPATION: 0
NAUSEA: 0
VOMITING: 0
SPUTUM PRODUCTION: 0
DIZZINESS: 0
DEPRESSION: 0
WEAKNESS: 0
ABDOMINAL PAIN: 0
STRIDOR: 0
TINGLING: 0
COUGH: 0
MYALGIAS: 0
DIARRHEA: 0
LOSS OF CONSCIOUSNESS: 0
PALPITATIONS: 0
CHILLS: 0
FEVER: 0
FALLS: 0
SHORTNESS OF BREATH: 1
HEADACHES: 0

## 2017-11-05 ASSESSMENT — PATIENT HEALTH QUESTIONNAIRE - PHQ9
SUM OF ALL RESPONSES TO PHQ9 QUESTIONS 1 AND 2: 0
2. FEELING DOWN, DEPRESSED, IRRITABLE, OR HOPELESS: NOT AT ALL
1. LITTLE INTEREST OR PLEASURE IN DOING THINGS: NOT AT ALL
SUM OF ALL RESPONSES TO PHQ QUESTIONS 1-9: 0

## 2017-11-05 ASSESSMENT — LIFESTYLE VARIABLES: DO YOU DRINK ALCOHOL: NO

## 2017-11-05 ASSESSMENT — PAIN SCALES - GENERAL
PAINLEVEL_OUTOF10: 0
PAINLEVEL_OUTOF10: 0

## 2017-11-05 NOTE — ED PROVIDER NOTES
"ED Provider Note    CHIEF COMPLAINT  Chief Complaint   Patient presents with   • Shortness of Breath   • Dizziness       HPI  Modesta Archuleta is a 37 y.o. female with h/o pulmonary HTN who presents complaining of SOB and dizziness.    The symptoms began when the patient awoke from a nap. She had been asleep for approximately 3 hours. When she woke with these symptoms, she put herself on home O2 which she uses as needed. Her pulse ox at that time was 86% and did not improve with oxygen so she called 911. Patient has been advised to use oxygen as needed.    At this time, the patient states she feels \"100% better.\"    Patient denies history of DVT/PE, fever, chills, hemoptysis, calf pain, leg swelling.    Patient states she is scheduled to have a sleep study on December 7.    Patient reports 2 recent admissions secondary to the exact same symptoms she had today with presumed diagnosis of obstructive sleep apnea causing her pulmonary hypertension.        ALLERGIES  Allergies   Allergen Reactions   • Erythromycin      Pt reports \" crying for no reason, and my jaw locked years ago\"    • Pcn [Penicillins] Hives       CURRENT MEDICATIONS  Home Medications    **Home medications have not yet been reviewed for this encounter**         PAST MEDICAL HISTORY   has a past medical history of Anxiety and depression (2/12/2015); Chickenpox; Chronic diarrhea (2/12/2015); and Ocular migraine (2/12/2015).    SURGICAL HISTORY   has a past surgical history that includes arthroscopy, knee; tonsillectomy; and primary c section (11/13/2016).    SOCIAL HISTORY  Social History     Social History Main Topics   • Smoking status: Never Smoker   • Smokeless tobacco: Never Used   • Alcohol use Yes      Comment: once every 3 weeks   • Drug use: No   • Sexual activity: Not on file      Comment:        Family Hx:  No family history of PE/DVT      REVIEW OF SYSTEMS  See HPI for further details.  All other systems are negative except as above in " "HPI.    PHYSICAL EXAM  VITAL SIGNS: /70   Pulse 70   Temp 36.8 °C (98.2 °F)   Resp 18   Ht 1.702 m (5' 7\")   Wt 117.9 kg (260 lb)   BMI 40.72 kg/m²     General:  WDWN, nontoxic appearing in NAD; A+Ox3; V/S as above   Skin: warm and dry; good color; no rash  HEENT: NCAT; EOMs intact; PERRL; no scleral icterus   Neck: FROM; no JVD  Cardiovascular: Regular heart rate and rhythm.  No murmurs, rubs, or gallops; pulses 2+ bilaterally radially and DP areas  Lungs: Clear to auscultation with good air movement bilaterally.  No wheezes, rhonchi, or rales.   Abdomen: BS present; soft; NTND; no rebound, guarding, or rigidity.  No organomegaly or pulsatile mass  Extremities: HURLEY x 4; no e/o trauma; no pedal edema; neg Cheryl's  Neurologic: CNs III-XII grossly intact; speech clear; distal sensation intact; strength 5/5 UE/LEs  Psychiatric: Appropriate affect, normal mood    LABS  Results for orders placed or performed during the hospital encounter of 11/05/17   CBC WITH DIFFERENTIAL   Result Value Ref Range    WBC 16.5 (H) 4.8 - 10.8 K/uL    RBC 4.56 4.20 - 5.40 M/uL    Hemoglobin 13.6 12.0 - 16.0 g/dL    Hematocrit 40.9 37.0 - 47.0 %    MCV 89.7 81.4 - 97.8 fL    MCH 29.8 27.0 - 33.0 pg    MCHC 33.3 (L) 33.6 - 35.0 g/dL    RDW 42.1 35.9 - 50.0 fL    Platelet Count 366 164 - 446 K/uL    MPV 9.7 9.0 - 12.9 fL    Neutrophils-Polys 84.50 (H) 44.00 - 72.00 %    Lymphocytes 9.40 (L) 22.00 - 41.00 %    Monocytes 4.20 0.00 - 13.40 %    Eosinophils 1.10 0.00 - 6.90 %    Basophils 0.30 0.00 - 1.80 %    Immature Granulocytes 0.50 0.00 - 0.90 %    Nucleated RBC 0.00 /100 WBC    Neutrophils (Absolute) 13.92 (H) 2.00 - 7.15 K/uL    Lymphs (Absolute) 1.55 1.00 - 4.80 K/uL    Monos (Absolute) 0.69 0.00 - 0.85 K/uL    Eos (Absolute) 0.18 0.00 - 0.51 K/uL    Baso (Absolute) 0.05 0.00 - 0.12 K/uL    Immature Granulocytes (abs) 0.08 0.00 - 0.11 K/uL    NRBC (Absolute) 0.00 K/uL   BASIC METABOLIC PANEL   Result Value Ref Range    Sodium " 137 135 - 145 mmol/L    Potassium 3.9 3.6 - 5.5 mmol/L    Chloride 103 96 - 112 mmol/L    Co2 25 20 - 33 mmol/L    Glucose 104 (H) 65 - 99 mg/dL    Bun 11 8 - 22 mg/dL    Creatinine 0.99 0.50 - 1.40 mg/dL    Calcium 9.1 8.5 - 10.5 mg/dL    Anion Gap 9.0 0.0 - 11.9   TROPONIN   Result Value Ref Range    Troponin I <0.01 0.00 - 0.04 ng/mL   BNP   Result Value Ref Range    B Natriuretic Peptide 180 (H) 0 - 100 pg/mL   MAGNESIUM   Result Value Ref Range    Magnesium 1.8 1.5 - 2.5 mg/dL   PHOSPHORUS   Result Value Ref Range    Phosphorus 4.3 2.5 - 4.5 mg/dL   BETA-HCG QUALITATIVE SERUM   Result Value Ref Range    Beta-Hcg Qualitative Serum Negative Negative   ESTIMATED GFR   Result Value Ref Range    GFR If African American >60 >60 mL/min/1.73 m 2    GFR If Non African American >60 >60 mL/min/1.73 m 2   EKG (ER)   Result Value Ref Range    Report       [facility] Emergency Dept.    Test Date:  2017  Pt Name:    TWIN ABDUL                Department: ER  MRN:        [id number]                      Room:       RD 10  Gender:     F                            Technician: [id number]  :        1980                   Requested By:ER TRIAGE PROTOCOL  Order #:    [id number]                    Reading MD:    Measurements  Intervals                                Axis  Rate:       71                           P:          16  WA:         132                          QRS:        44  QRSD:       78                           T:          37  QT:         412  QTc:        448    Interpretive Statements  SINUS RHYTHM  Compared to ECG 10/30/2017 23:13:03  No significant changes           EKG  12 Lead EKG obtained [id number] and interpreted by me to show:  Rhythm: Sinus rhythm   Rate: 71  Intervals:   WA: 132  QRS: 78  QTC: 448  All intervals are within normal limits  No ectopy  Normal axis  No ST changes  Clinical Impression: Sinus rhythm with no acute ST changes  Compared to previous EKG dated 10/30/17 which shows  no significant change  EKG has been confirmed in [facility]       IMAGING  DX-CHEST-PORTABLE (1 VIEW)   Final Result      No evidence of acute cardiopulmonary process.      '  MEDICAL RECORD  I have reviewed patient's medical record and pertinent results are listed below.      COURSE & MEDICAL DECISION MAKING  I have reviewed any medical record information, laboratory studies and radiographic results as noted.    Modesta Archuleta is a 37 y.o. female who presents reporting awakening with dizziness and shortness of breath while off of her oxygen which she is prescribed to use as needed. The patient has a working diagnosis of obstructive sleep apnea but has not had her sleep study yet.    EKG demonstrates no arrhythmia or acute ST changes.    Chest x-ray demonstrates no acute pathology.    Patient is feeling 100% better. She has no symptoms at this time. She was trialed off of her oxygen while here in the ER and was 88% on room air. Patient is not sure what her typical room air O2 sat is.  She is on OCPs but has had CT chest and d-dimers that are negative in the last several months.  Given that these symptoms are similar to prior episodes, I feel that obtaining a 3rd CT chest is not necessary at this time and exposes the patient increased radiation risk.    I have advised the patient to use her oxygen 24/7.    4:29 PM  I contacted [facility] to inquire about obtaining a sleep study sooner thatn 12/7.  I will page Dr. Ware who coordinates this.  I called the ER  to inquire as well.      5:10 PM  I contacted the  regarding verification of the patient's concentrator capabilities and settings at home. I feel that if the concentrator setting can be increased to 4-6 L, she may avoid the symptoms that brought her in today.  Also, she is on the wait list for cancellations for a sleep study. She should call to verify this tomorrow with her sleep center.    5:52 PM  We cannot verify that a concentrator will be  available to increase the patient's O2 settings this evening. Given this, we will admit the patient to the CDU/observation status. I have discussed the case with Dr. Courtney from the hospitalist service who agrees to this plan. The patient and her fiancé also agreed to this plan.      FINAL IMPRESSION  1. Shortness of breath    2. Pulmonary hypertension        Electronically signed by: Brynn Menendez, 11/5/2017 3:10 PM

## 2017-11-05 NOTE — ED NOTES
Pt arrived via ems, per ems pt was discharged from here 4 days ago for pulmonary htn. Today pt was sleeping and became SOB, with dizziness. En route pt received 12.5. U/a pt sitting up caox4 speaking in full sentences no distress, stating she is having sob, no cp, no abd pain, c/o dizziness.

## 2017-11-05 NOTE — PROGRESS NOTES
"11/5/17 at 1:52 PM--Received incoming VM from pt at 1:46 PM.  Placed phone call to patient s/p hospital discharge 11/1/17.  Pt states she is SOB, her \"heart is racing\", and her O2 sat is low today.  Instructed pt to go to ER for any new or worsening s/s.  Pt verbalizes understanding and states that she will go to ER.  No further questions at this time.  "

## 2017-11-06 ENCOUNTER — PATIENT OUTREACH (OUTPATIENT)
Dept: HEALTH INFORMATION MANAGEMENT | Facility: OTHER | Age: 37
End: 2017-11-06

## 2017-11-06 ENCOUNTER — TELEPHONE (OUTPATIENT)
Dept: MEDICAL GROUP | Facility: PHYSICIAN GROUP | Age: 37
End: 2017-11-06

## 2017-11-06 VITALS
WEIGHT: 258.6 LBS | HEIGHT: 67 IN | BODY MASS INDEX: 40.59 KG/M2 | SYSTOLIC BLOOD PRESSURE: 113 MMHG | RESPIRATION RATE: 16 BRPM | OXYGEN SATURATION: 95 % | HEART RATE: 51 BPM | TEMPERATURE: 97.9 F | DIASTOLIC BLOOD PRESSURE: 57 MMHG

## 2017-11-06 LAB
ANION GAP SERPL CALC-SCNC: 6 MMOL/L (ref 0–11.9)
BUN SERPL-MCNC: 11 MG/DL (ref 8–22)
CALCIUM SERPL-MCNC: 9.1 MG/DL (ref 8.5–10.5)
CHLORIDE SERPL-SCNC: 107 MMOL/L (ref 96–112)
CO2 SERPL-SCNC: 23 MMOL/L (ref 20–33)
CREAT SERPL-MCNC: 0.74 MG/DL (ref 0.5–1.4)
ERYTHROCYTE [DISTWIDTH] IN BLOOD BY AUTOMATED COUNT: 43.6 FL (ref 35.9–50)
GFR SERPL CREATININE-BSD FRML MDRD: >60 ML/MIN/1.73 M 2
GLUCOSE SERPL-MCNC: 100 MG/DL (ref 65–99)
HCT VFR BLD AUTO: 39 % (ref 37–47)
HGB BLD-MCNC: 12.9 G/DL (ref 12–16)
MCH RBC QN AUTO: 29.9 PG (ref 27–33)
MCHC RBC AUTO-ENTMCNC: 33.1 G/DL (ref 33.6–35)
MCV RBC AUTO: 90.3 FL (ref 81.4–97.8)
PLATELET # BLD AUTO: 355 K/UL (ref 164–446)
PMV BLD AUTO: 9.9 FL (ref 9–12.9)
POTASSIUM SERPL-SCNC: 4 MMOL/L (ref 3.6–5.5)
RBC # BLD AUTO: 4.32 M/UL (ref 4.2–5.4)
SODIUM SERPL-SCNC: 136 MMOL/L (ref 135–145)
WBC # BLD AUTO: 15.2 K/UL (ref 4.8–10.8)

## 2017-11-06 PROCEDURE — G0378 HOSPITAL OBSERVATION PER HR: HCPCS

## 2017-11-06 PROCEDURE — 99217 PR OBSERVATION CARE DISCHARGE: CPT | Performed by: HOSPITALIST

## 2017-11-06 PROCEDURE — 85027 COMPLETE CBC AUTOMATED: CPT

## 2017-11-06 PROCEDURE — 700102 HCHG RX REV CODE 250 W/ 637 OVERRIDE(OP): Performed by: INTERNAL MEDICINE

## 2017-11-06 PROCEDURE — 80048 BASIC METABOLIC PNL TOTAL CA: CPT

## 2017-11-06 PROCEDURE — A9270 NON-COVERED ITEM OR SERVICE: HCPCS | Performed by: INTERNAL MEDICINE

## 2017-11-06 RX ADMIN — POTASSIUM CHLORIDE 10 MEQ: 1500 TABLET, EXTENDED RELEASE ORAL at 09:21

## 2017-11-06 RX ADMIN — FLUOXETINE HYDROCHLORIDE 80 MG: 20 CAPSULE ORAL at 09:21

## 2017-11-06 RX ADMIN — FUROSEMIDE 20 MG: 20 TABLET ORAL at 09:22

## 2017-11-06 ASSESSMENT — LIFESTYLE VARIABLES
EVER_SMOKED: NEVER
DO YOU DRINK ALCOHOL: NO

## 2017-11-06 ASSESSMENT — PAIN SCALES - GENERAL: PAINLEVEL_OUTOF10: 0

## 2017-11-06 NOTE — DISCHARGE INSTRUCTIONS
Discharge Instructions    Discharged to home by car with self. Discharged via walking, hospital escort: Yes.  Special equipment needed: Oxygen    Be sure to schedule a follow-up appointment with your primary care doctor or any specialists as instructed.     Discharge Plan:   Diet Plan: Discussed  Activity Level: Discussed  Confirmed Follow up Appointment: Appointment Scheduled  Confirmed Symptoms Management: Discussed  Medication Reconciliation Updated: Yes  Influenza Vaccine Indication: Patient Refuses    I understand that a diet low in cholesterol, fat, and sodium is recommended for good health. Unless I have been given specific instructions below for another diet, I accept this instruction as my diet prescription.   Other diet: Regular Diet    Special Instructions: None    · Is patient discharged on Warfarin / Coumadin?   No     · Is patient Post Blood Transfusion?  No    Depression / Suicide Risk    As you are discharged from this [facility] facility, it is important to learn how to keep safe from harming yourself.    Recognize the warning signs:  · Abrupt changes in personality, positive or negative- including increase in energy   · Giving away possessions  · Change in eating patterns- significant weight changes-  positive or negative  · Change in sleeping patterns- unable to sleep or sleeping all the time   · Unwillingness or inability to communicate  · Depression  · Unusual sadness, discouragement and loneliness  · Talk of wanting to die  · Neglect of personal appearance   · Rebelliousness- reckless behavior  · Withdrawal from people/activities they love  · Confusion- inability to concentrate     If you or a loved one observes any of these behaviors or has concerns about self-harm, here's what you can do:  · Talk about it- your feelings and reasons for harming yourself  · Remove any means that you might use to hurt yourself (examples: pills, rope, extension cords, firearm)  · Get professional help from the  community (Mental Health, Substance Abuse, psychological counseling)  · Do not be alone:Call your Safe Contact- someone whom you trust who will be there for you.  · Call your local CRISIS HOTLINE 742-0853 or 676-063-4739  · Call your local Children's Mobile Crisis Response Team [place] (274) 816-7100 or www.[employer]  · Call the toll free National Suicide Prevention Hotlines   · National Suicide Prevention Lifeline 518-437-ZGJP (9114)  · National Hope Line Network 800-SUICIDE (226-4064)

## 2017-11-06 NOTE — PROGRESS NOTES
Assumed patient care. Discuss about plan of care. Will check patient Oxygen use while walking. Right now on 2 liters maintain 96% saturation. Denies SOB and chest pain.

## 2017-11-06 NOTE — PROGRESS NOTES
Patient seen and examined today.    Patient tolerating treatment and therapies.  All Data, Medication data reviewed.  Case discussed with nursing as available.  Plan of Care reviewed with patient and notified of changes.  Pt ok  Awaiting addl. O2 orders and arrangement  Unable to speed up PSG  Ok for d/c  See d/c summary

## 2017-11-06 NOTE — PROGRESS NOTES
Ambulate patient in the hallway and 84% oxygen saturation in room air. No shortness of breath just good walking in the hallway. Got her back on oxygen.

## 2017-11-06 NOTE — ASSESSMENT & PLAN NOTE
- Was significant initially, now markedly improved  - Patient has had these symptoms frequently, would help if her concentrator could go higher than 2 L  - She is continuing her workup as an outpatient, scheduled for a sleep study on 12/7, she likely also has sleep apnea causing her pulmonary hypertension  - No sign or symptom suggesting pneumonia  - Patient has had 2 recent CTA chest to rule out pulmonary embolism, both negative, most recent was 10/30, no need to repeat this

## 2017-11-06 NOTE — DISCHARGE PLANNING
Received DME choice from [facility]) at 0839.  DME referral for oxygen sent to [facility] at 1315.

## 2017-11-06 NOTE — H&P
Hospital Medicine History and Physical    Date of Service  11/5/2017    Chief Complaint  Chief Complaint   Patient presents with   • Shortness of Breath   • Dizziness       History of Presenting Illness  37 y.o. female who presented 11/5/2017 withShortness of breath. Patient has had 2 recent admits for shortness of breath, becomes more short of breath while she is sleeping. Patient has had a right heart cath which showed pulmonary hypertension. Patient is also scheduled for sleep study on 12/7. Patient states today she took a nap, woke up very short of breath noted that she was hypoxic at 86%. Generally if she puts 2 L of oxygen on this resolves, she does have some sort of concentrator at home but only allows 2 L she use that with no improvement. Shortness of breath persisted so she presented to the emergency department. Patient did improve but is being admitted, hopefully she can get a new concentrator that will allow more than 2 L.  Primary Care Physician  Quynh Joel M.D.    Consultants  None    Code Status  Full    Review of Systems  Review of Systems   Constitutional: Negative for chills, fever and malaise/fatigue.   HENT: Positive for congestion.    Respiratory: Positive for shortness of breath. Negative for cough, sputum production and stridor.    Cardiovascular: Negative for chest pain, palpitations and leg swelling.   Gastrointestinal: Negative for abdominal pain, constipation, diarrhea, nausea and vomiting.   Genitourinary: Negative for dysuria and urgency.   Musculoskeletal: Negative for falls and myalgias.   Neurological: Negative for dizziness, tingling, loss of consciousness, weakness and headaches.   Psychiatric/Behavioral: Negative for depression and suicidal ideas.   All other systems reviewed and are negative.       Past Medical History  Past Medical History:   Diagnosis Date   • Ocular migraine 2/12/2015   • Anxiety and depression 2/12/2015   • Chronic diarrhea 2/12/2015   • Chickenpox   "      Surgical History  Past Surgical History:   Procedure Laterality Date   • PRIMARY C SECTION  2016    Procedure: PRIMARY C SECTION;  Surgeon: Delphine Carbone M.D.;  Location: LABOR AND DELIVERY;  Service:    • ARTHROSCOPY, KNEE     • TONSILLECTOMY         Medications  No current facility-administered medications on file prior to encounter.      Current Outpatient Prescriptions on File Prior to Encounter   Medication Sig Dispense Refill   • furosemide (LASIX) 20 MG Tab Take 1 Tab by mouth every day. 60 Tab 0   • potassium chloride SA (K-DUR) 10 MEQ Tab CR Take 1 Tab by mouth every day. 60 Tab 1   • levonorgestrel-ethinyl estradiol (TRIVORA, 28,) per tablet Take 1 Tab by mouth every day.     • fluoxetine (PROZAC) 40 MG capsule Take 80 mg by mouth every day.         Family History  Family History   Problem Relation Age of Onset   • Sleep Apnea Mother    • Hypertension Father    • Heart Disease Father    • DVT Father        Social History  Social History   Substance Use Topics   • Smoking status: Never Smoker   • Smokeless tobacco: Never Used   • Alcohol use Yes      Comment: once every 3 weeks       Allergies  Allergies   Allergen Reactions   • Erythromycin Unspecified     Pt reports \" crying for no reason, and my jaw locked years ago\"    • Pcn [Penicillins] Hives        Physical Exam  Laboratory   Hemodynamics  Temp (24hrs), Av.8 °C (98.2 °F), Min:36.8 °C (98.2 °F), Max:36.8 °C (98.2 °F)   Temperature: 36.8 °C (98.2 °F)  Pulse  Av.9  Min: 67  Max: 86 Heart Rate (Monitored): 70  Blood Pressure: 124/70, NIBP: 127/73      Respiratory      Respiration: (!) 26, Pulse Oximetry: 94 %             Physical Exam   Constitutional: She is oriented to person, place, and time. She appears well-developed. No distress.   HENT:   Head: Normocephalic and atraumatic.   Mouth/Throat: Oropharynx is clear and moist. No oropharyngeal exudate.   Eyes: Right eye exhibits no discharge. Left eye exhibits no discharge.   Neck: " Neck supple. No tracheal deviation present.   Cardiovascular: Normal rate and regular rhythm.  Exam reveals no gallop and no friction rub.    No murmur heard.  Pulmonary/Chest: Effort normal and breath sounds normal. No stridor. No respiratory distress. She has no wheezes. She has no rales. She exhibits no tenderness.   Abdominal: Soft. Bowel sounds are normal. She exhibits no distension. There is no tenderness.   Musculoskeletal: Normal range of motion. She exhibits no edema or tenderness.   Lymphadenopathy:     She has no cervical adenopathy.   Neurological: She is alert and oriented to person, place, and time. No cranial nerve deficit.   Skin: Skin is warm and dry. No rash noted. She is not diaphoretic. No erythema.   Psychiatric: She has a normal mood and affect. Her behavior is normal. Judgment and thought content normal.   Nursing note and vitals reviewed.      Recent Labs      11/05/17   1521   WBC  16.5*   RBC  4.56   HEMOGLOBIN  13.6   HEMATOCRIT  40.9   MCV  89.7   MCH  29.8   MCHC  33.3*   RDW  42.1   PLATELETCT  366   MPV  9.7     Recent Labs      11/05/17   1521   SODIUM  137   POTASSIUM  3.9   CHLORIDE  103   CO2  25   GLUCOSE  104*   BUN  11   CREATININE  0.99   CALCIUM  9.1     Recent Labs      11/05/17   1521   GLUCOSE  104*         Recent Labs      11/05/17   1521   BNPBTYPENAT  180*         Lab Results   Component Value Date    TROPONINI <0.01 11/05/2017     Urinalysis:    Lab Results  Component Value Date/Time   SPECGRAVITY >1.045 10/31/2017 0824   SPECGRAVITY >1.045 10/31/2017 0824   GLUCOSEUR Negative 10/31/2017 0824   KETONES Negative 10/31/2017 0824   NITRITE Negative 10/31/2017 0824   WBCURINE 2-5 10/31/2017 0824   RBCURINE 0-2 10/31/2017 0824   BACTERIA Negative 10/31/2017 0824   EPITHELCELL Moderate (A) 10/31/2017 0824        Imaging  Chest x-ray-no acute cardio pulmonary process    Assessment/Plan     I anticipate this patient is appropriate for observation status at this time.    Hypoxia    Assessment & Plan    - Was significant initially, now markedly improved  - Patient has had these symptoms frequently, would help if her concentrator could go higher than 2 L  - She is continuing her workup as an outpatient, scheduled for a sleep study on 12/7, she likely also has sleep apnea causing her pulmonary hypertension  - No sign or symptom suggesting pneumonia  - Patient has had 2 recent CTA chest to rule out pulmonary embolism, both negative, most recent was 10/30, no need to repeat this        Leukocytosis   Assessment & Plan    - Reactive, no additional sign of infection, afebrile  - Repeat CBC in the morning          Pulmonary hypertension- (present on admission)   Assessment & Plan    - Noted on right heart cath, continue outpatient follow-up and workup        Anxiety and depression- (present on admission)   Assessment & Plan    - Continue home med            VTE prophylaxis:Lovenox .

## 2017-11-06 NOTE — DISCHARGE PLANNING
Call from Laisha @ [facility].  Oxygen orders were only good for one month, they have been unable to reach pt by phone to schedule  of equipment so, per [facility], pt has been utilizing oxygen x 1 month without order.  Dr. Zapien has left for day so new order not able to be obtained, spoke with Dr. Courtney and pt will be admitted OBSV to assess and document ox for further ox order if need be.

## 2017-11-06 NOTE — FACE TO FACE
Face to Face Note  -  Durable Medical Equipment    Scott Granger M.D. - NPI: [id number]  I certify that this patient is under my care and that they had a durable medical equipment(DME)face to face encounter by myself that meets the physician DME face-to-face encounter requirements with this patient on:    Date of encounter:   Patient:                    MRN:                       YOB: 2015  Modesta Archuleta  [id number]  1980     The encounter with the patient was in whole, or in part, for the following medical condition, which is the primary reason for durable medical equipment:  Other - Hypoxia, pulmonary hypertension    I certify that, based on my findings, the following durable medical equipment is medically necessary:  Oxygen.    HOME O2 Saturation Measurements:(Values must be present for Home Oxygen orders)  Room air sat at rest: 92  Room air sat with amb: 84  With liters of O2: 2, O2 sat at rest with O2: 96  With Liters of O2: 3, O2 sat with amb with O2 : 94  Is the patient mobile?: Yes    My Clinical findings support the need for the above equipment due to:  Hypoxia    Supporting Symptoms: Room air saturation 84% , needs 3L to get above 90% when ambulating

## 2017-11-06 NOTE — DISCHARGE PLANNING
Per Dr. Zapien pt home oxygen delivery unit is limited to flow rate of 2lm.  Met with pt and SO @ bedside and they confirm that.  Checked original order by Dr Shah in Sept and original flow order was for 2lm ONLY.  Therefor [facility] elected to supply pt with portable and rechaging unit for portable ONLY as that would accomodate her needs.  Recently she has experienced drops in ox and Dr. Zapien would like pt to be able to use higher flow rate prn.  Called [facility] again, they are contacting sup to see if real concentrator can be delivered to pt this evening.

## 2017-11-06 NOTE — PROGRESS NOTES
Pt arrived to unit via gurney. VS stable.  Assessment complete. No signs of distress noted at this time. Currently on 2 liters NC to keep O2 above 90%.   Pt denies pain.  Pt educated regarding fall precautions, encouraged use of call light for assistance. Provided patient with sandwich box.  Pt denies any additional needs at this time.  Call light within reach. Will continue to monitor.

## 2017-11-06 NOTE — TELEPHONE ENCOUNTER
1. Caller Name: Modesta Compston                                           Call Back Number: [phone] (home)         Patient approves a detailed voicemail message: N\A    Pt has been admitted for SOB and has a follow up appointment with you on Wed.  She has an appointment for a sleep study 12/07/17 but is asking if any way she can get it sooner.  She states she was told she could not request it.  She feels not having a CPAP is why she is in the hospital.

## 2017-11-06 NOTE — DISCHARGE PLANNING
Received DME acceptance from [facility], [place].  Oxygen tank will be delivered at 1530.

## 2017-11-06 NOTE — ED NOTES
Completed med rec by interviewing patient at bedside.  Allergies reviewed  Patient was D/C'd from [facility] on 11/1/17

## 2017-11-06 NOTE — PROGRESS NOTES
Patient is resting with eyes closed rise and fall of chest observed. No signs of pain or respiratory  distress noted.continues on 2 liters O2 to keep oxygen above 90%.

## 2017-11-07 ENCOUNTER — PATIENT OUTREACH (OUTPATIENT)
Dept: HEALTH INFORMATION MANAGEMENT | Facility: OTHER | Age: 37
End: 2017-11-07

## 2017-11-07 NOTE — DISCHARGE SUMMARY
DATE OF ADMISSION:  2017    DATE OF DISCHARGE:  2017    DISCHARGE DISPOSITION:  To home.    DISCHARGE CONDITION:  Medically stable and improved.    DISCHARGE FOLLOWUP:  Closely with the patient's primary care provider, Dr. Quynh Joel.    DISCHARGE DIAGNOSES:  1.  Status post chronic hypoxemia, likely secondary to hypopnea obstructive   sleep apnea syndrome.  The patient at this time will be upgraded in terms of   her oxygen supplementation.  She will require an ambulatory system that   provides at least 3 liters and at home continued O2 supply with a concentrator   that goes also at least to 3 liters per minute.  The patient does have follow   up and outpatient workup with a polysomnogram.  2.  Mild leukocytosis.  3.  Pulmonary hypertension.  4.  Anxiety and depression.  5.  Dietary obesity.  6.  History of ocular migraine.  7.  History of chicken pox.  8.  History of .  9.  History of knee arthroscopy.  10.  History of tonsillectomy and adenoidectomy.    For presenting symptoms, HPI, and physical findings, please refer to the   dictated H and P.    DISCHARGE MEDICATIONS:  As follows:  1.  Mucinex DM maximum strength p.r.n.  2.  Prozac 80 mg daily.  3.  Lasix 20 mg daily.  4.  Trivora 1 tablet p.o. daily.  5.  Potassium chloride 10 mEq p.o. daily.    HOSPITAL COURSE:  The patient was admitted with difficulties with dizziness   and shortness of breath.  The patient was found hypoxic where she drops to   86%.  The patient did note resolution with just 2 liters of oxygen as   prescribed.  She presents to the emergency room.  Patient was evaluated and   did not have any significant additional abnormalities including some   laboratory workup that was performed, troponin and a slightly elevated BNP.    She did have several imaging modalities just as of recent with a CTA and   pulmonary angiogram just on 10/30/2017, which showed no pulmonary embolism or   some bronchitis, found in the left lobe  calcified nodule; otherwise, no acute   findings.  She had an echocardiogram 09/06/2017 showing a preserved EF at 60%,   but she does have a right ventricular pressure of 50 mmHg.  Patient is in the   process of getting a sleep apnea diagnosed possibly.  At this time, she feels   well.  She is placed on higher dose of oxygen, which has been improving her   symptoms.  The patient at this time is stable for discharge.  Close outpatient   followup with pulmonary as well as primary care once her oxygen supplies are   set up.    Time spent on discharge 35 minutes.       ____________________________________     ONEL GREENE MD    SS / NTS    DD:  11/06/2017 12:45:58  DT:  11/07/2017 09:36:36    D#:  [id number]  Job#:  [id number]    cc: PRASANNA CAMPOS MD, Pulmonary Medicine [facility]

## 2017-11-07 NOTE — DOCUMENTATION QUERY
"DOCUMENTATION QUERY     PROVIDERS: Please select “Cosign w/ note”to reply to query.     To better represent the severity of illness of your patient, please review the following information and exercise your independent professional judgment in responding to this query.        The Procedure Note documents  \"Biventricular diastolic heart failure\".  Based upon the clinical findings, risk factors, and treatment, can this diagnosis be further specified?     Please select all that apply:     • Acute Diastolic heart failure  • Chronic Diastolic heart failure  • Acute on chronic diastolic heart failure  • Other explanation of clinical findings (please document)  • Unable to determine       The medical record reflects the following:   Clinical Findings HEMODYNAMIC FINDINGS:  AoP 109/83 (95)  mmHg    mmHg   LVEDP 21 mmHg   RA 19  mmHg   RV 51/15  mmHg   RVEDP 21  mmHg   PA 57/36  mmHg   mPAP 44  mmHg   PCWP 20  mmHg   TDCO 3.7  L/min   TDCI 1.64  L/min/m2   PVR 6.5  W.U.         II. LEFT VENTRICULOGRAM:                        LVEF ESTRADA PROJECTION:70 %                          III. CORONARY ANGIOGRAPHY:  Left Main: Large short bifurcating no CAD  Left Anterior Descending: Moderate sized and long with a moderate size 1st diagonal. It becomes a diffusely small vessel in its midportion to its termination at the apex. No epicardial CAD noted.  Left Circumflex: Moderate sized, dominant, no CAD.  Right Coronary: Anterior takeoff from the right coronary cusp, small size nondominant no CAD   Treatment Started on daily Furosemide and potassium replacement   Risk Factors Elevated troponin, morbid obesity, possible TIMI   Location within medical record Cardiac Cath Report, Discharge Summary      Thank you,   Tanisha Merida, CCS, CCS-P  [email or web address]      "

## 2017-11-07 NOTE — ADDENDUM NOTE
Encounter addended by: Tanisha Merida on: 11/7/2017 11:20 AM<BR>    Actions taken: Pend clinical note

## 2017-11-07 NOTE — ADDENDUM NOTE
Encounter addended by: Chastity Newman on: 11/7/2017  9:10 AM<BR>    Actions taken: Flowsheet accepted, Utilization Review saved

## 2017-11-08 ENCOUNTER — OFFICE VISIT (OUTPATIENT)
Dept: MEDICAL GROUP | Facility: PHYSICIAN GROUP | Age: 37
End: 2017-11-08
Payer: COMMERCIAL

## 2017-11-08 ENCOUNTER — TELEPHONE (OUTPATIENT)
Dept: MEDICAL GROUP | Facility: PHYSICIAN GROUP | Age: 37
End: 2017-11-08

## 2017-11-08 VITALS
RESPIRATION RATE: 16 BRPM | HEART RATE: 77 BPM | BODY MASS INDEX: 40.81 KG/M2 | DIASTOLIC BLOOD PRESSURE: 96 MMHG | SYSTOLIC BLOOD PRESSURE: 148 MMHG | OXYGEN SATURATION: 96 % | HEIGHT: 67 IN | WEIGHT: 260 LBS | TEMPERATURE: 98 F

## 2017-11-08 DIAGNOSIS — E66.01 MORBID OBESITY WITH BMI OF 40.0-44.9, ADULT (HCC): ICD-10-CM

## 2017-11-08 DIAGNOSIS — J96.10 CHRONIC RESPIRATORY FAILURE, UNSPECIFIED WHETHER WITH HYPOXIA OR HYPERCAPNIA (HCC): ICD-10-CM

## 2017-11-08 PROBLEM — A08.4 VIRAL GASTROENTERITIS: Status: RESOLVED | Noted: 2017-09-06 | Resolved: 2017-11-08

## 2017-11-08 PROBLEM — R06.09 DOE (DYSPNEA ON EXERTION): Status: RESOLVED | Noted: 2017-09-06 | Resolved: 2017-11-08

## 2017-11-08 PROCEDURE — 99214 OFFICE O/P EST MOD 30 MIN: CPT | Performed by: INTERNAL MEDICINE

## 2017-11-08 NOTE — PROGRESS NOTES
PRIMARY CARE CLINIC FOLLOW UP VISIT  Chief Complaint   Patient presents with   • Hospital Follow-up       History of Present Illness     Modesta is a 36 yo female patient of Dr. Joel I am seeing for hospital follow up (hospitalized 11/5 - 11/6 for shortness of breath thought to be secondary to TIMI, sleep study pending)    Chronic respiratory failure (CMS-[facility])  Was recently hospitalized again earlier this month (3 hospitalizations in the last month) for shortness of breath. Has had multiple hospitalizations with extensive work up negative for PE and ACS (had CTA and cardiac cath). Has been on oxygen since her first admission over labor day weekend and at that time it was prescribed as needed. Her persistent respiratory failure is thought to be secondary to obstructive sleep apnea secondary to her obesity. She has a sleep study scheduled now but it hasn't been done yet, scheduled for 12/7. Is on the cancellation list for sleep studies however this is the earliest appointment they can provide. Was told to be on 3L oxygen 24/7 but today has only been on it for a couple hours. Uses it most of the night, however. Has been doing fairly since being discharged from the hospital Monday (3 days ago) and has been back to work as a . She is currently using an oxygen tank but would like a portable tank.     Morbid obesity with BMI of 40.0-44.9, adult ([facility])  Has lost about 13 lbs with lifestyle modifications since she started being hospitalized for her shortness of breath. She would be interested, however, in a referral to the health improvement programs.       Past Medical History:   Diagnosis Date   • Anxiety and depression 2/12/2015   • Chickenpox    • Chronic diarrhea 2/12/2015   • Ocular migraine 2/12/2015     Past Surgical History:   Procedure Laterality Date   • PRIMARY C SECTION  11/13/2016    Procedure: PRIMARY C SECTION;  Surgeon: Delphine Carbone M.D.;  Location: LABOR AND DELIVERY;  Service:    •  "ARTHROSCOPY, KNEE     • TONSILLECTOMY       Social History   Substance Use Topics   • Smoking status: Never Smoker   • Smokeless tobacco: Never Used   • Alcohol use Yes      Comment: once every 3 weeks     Social History     Social History Narrative   • No narrative on file     Family History   Problem Relation Age of Onset   • Sleep Apnea Mother    • Hypertension Father    • Heart Disease Father    • DVT Father      Family Status   Relation Status   • Mother Alive   • Father Alive     Allergies: Erythromycin and Pcn [penicillins]    ROS  As per HPI above. All other systems reviewed and negative.        Objective   Blood pressure 148/96, pulse 77, temperature 36.7 °C (98 °F), resp. rate 16, height 1.702 m (5' 7\"), weight 117.9 kg (260 lb), SpO2 96 %, not currently breastfeeding. Body mass index is 40.72 kg/m².    General: alert and oriented, pleasant, cooperative  HEENT: Normocephalic, atraumatic. No thyroid masses. Oropharynx clear without exudate or injection.   Cardiovascular: regular rate and rhythm, normal S1/S2  Pulmonary: lungs clear to auscultation bilaterally  Gastrointestinal: no tenderness to palpation. No hepatosplenomegaly. Bowel sounds normoactive  Lymphatics: no cervical or supraclavicular lymphadenopathy   Skin: warm and dry, no lesions or rashes  Psychiatric: appropriate mood and affect. Good insight and appropriate judgment     Assessment and Plan   The following treatment plan was discussed     1. Chronic respiratory failure, unspecified whether with hypoxia or hypercapnia (CMS-HCC)  Has had an extensive work up during several hospitalizations negative for PE and ACS/MI however has pulmonary hypertension and her shortness of breath is thought to be secondary to obesity hypoventilation. She is scheduled for a sleep study for 12/7 and is on the cancellation list however this is the earliest appointment that they can accomodate (confirmed by MA from this office). We will also be requesting portable " oxygen tanks from [email or web address], her [employer] company.     2. Morbid obesity with BMI of 40.0-44.9, adult (CMS-[facility])  Has lost 13 lbs since having multiple hospitalizations for above. Encouraged continued weight loss and given referral to obesity clinic, for which patient is agreeable.   - Patient identified as having weight management issue.  Appropriate orders and counseling given.  - REFERRAL TO [facility] HEALTH IMPROVEMENT PROGRAMS (HIP) Services Requested: Weight Management Program; Reason for Visit: Overweight/Obesity; Future      Healthcare maintenance     Health Maintenance Due   Topic Date Due   • IMM PNEUMOCOCCAL 19-64 (ADULT) MEDIUM RISK SERIES (1 of 1 - PPSV23) 05/08/2001       Return in about 1 month (around 12/8/2017).    Servando Lomax MD  Internal Medicine  [facility]

## 2017-11-08 NOTE — ASSESSMENT & PLAN NOTE
Was recently hospitalized again earlier this month (3 hospitalizations in the last month) for shortness of breath. Has had multiple hospitalizations with extensive work up negative for PE and ACS (had CTA and cardiac cath). Has been on oxygen since her first admission over labor day weekend and at that time it was prescribed as needed. Her persistent respiratory failure is thought to be secondary to obstructive sleep apnea secondary to her obesity. She has a sleep study scheduled now but it hasn't been done yet, scheduled for 12/7. Is on the cancellation list for sleep studies however this is the earliest appointment they can provide. Was told to be on 3L oxygen 24/7 but today has only been on it for a couple hours. Uses it most of the night, however. Has been doing fairly since being discharged from the hospital Monday (3 days ago) and has been back to work as a . She is currently using an oxygen tank but would like a portable tank.

## 2017-11-08 NOTE — ADDENDUM NOTE
Encounter addended by: Tanisha Merida on: 11/8/2017  1:38 PM<BR>    Actions taken: Sign clinical note

## 2017-11-08 NOTE — ASSESSMENT & PLAN NOTE
Has lost about 13 lbs with lifestyle modifications since she started being hospitalized for her shortness of breath. She would be interested, however, in a referral to the health improvement programs.

## 2017-11-09 ENCOUNTER — SLEEP STUDY (OUTPATIENT)
Dept: SLEEP MEDICINE | Facility: MEDICAL CENTER | Age: 37
End: 2017-11-09
Attending: INTERNAL MEDICINE
Payer: COMMERCIAL

## 2017-11-09 DIAGNOSIS — G47.33 OBSTRUCTIVE SLEEP APNEA: ICD-10-CM

## 2017-11-09 PROCEDURE — 95810 POLYSOM 6/> YRS 4/> PARAM: CPT | Performed by: INTERNAL MEDICINE

## 2017-11-09 NOTE — TELEPHONE ENCOUNTER
Spoke to pulmonary/sleep (suzan Edwards) and confirmed that there are no earlier sleep study appointments available prior to 12/7/2017 for this patient. Suzan made a note of Modesta's MRN and understands she's had multiple hospitalizations for presumed TIMI and will try her best to move her sleep study up as best as she can.     Servando Lomax M.D.

## 2017-11-10 NOTE — PROCEDURES
Clinical Comments:  The patient underwent a comprehensive polysomnogram using the standard montage for measurement of parameters of sleep, respiratory events, movement abnormalities, heart rate and rhythm. A microphone was used to monitor snoring.      INTERPRETATION:  Testing began at 8:05:46 PM.  The total recording time was 503.1 minutes with a sleep period of 459.9 minutes and the total sleep time was 431.4 minutes with a sleep efficiency of 85.7%.  The sleep latency was 43.3 minutes, and REM latency was 121.5 minutes.  The patient experienced 111 arousals in total, for an arousal index of 15.4    RESPIRATORY: The patient had 3 apneas in total.  Of these, 2 were obstructive apneas, and 1 were central apneas.  This resulted in an apnea index (AI) of 0.4.  The patient had 72 hypopneas, for a hypopnea index of 10.0.  The overall AHI was 10.4, while the AHI during Stage R sleep was 22.3.  AHI while supine was 15.0.    OXIMETRY: Oxygen saturation monitoring showed a mean SpO2 of 89.9%, with a minimum oxygen saturation of 80.0%.  Oxygen saturations were less than or = 89% for 65.5 minutes of sleep time.    CARDIAC: The highest heart rate during the recording was 82.0 beats per minute.  The average heart rate during sleep was 56.4 bpm.    LIMB MOVEMENTS: There were a total of 169 PLMs during sleep, of which 16 were PLMs arousals.  This resulted in a PLMS index of 23.5.    The sleep efficiency was normal at 86%. Sleep architecture showed preserved sleep stages, with 4 REM cycles noted throughout the night. Sleep latency was prolonged at 43 minutes.    Upon falling asleep light snoring was noted, associated with obstructive respiratory events. The overall AHI was 10 events per hour, comprised of 96% hypopneas. There was associated oxygen desaturations below 90% for 75% of the night, or 212 minutes, to a daniel of 80%.    EKG showed sinus rhythm.     Elevated limb movements were noted periodically with index 23 events per  hour.    The patient did not meet split night criteria.    Impression:  Mild obstructive sleep apnea syndrome with AHI of 10 associated with significant hypoxia.  Periodic elevated limb movements.    Recommendations:  Treatment options for mild TIMI include airway pressurization (CPAP versus AutoPap), UPPP, or a dental appliance. The patient should follow-up in the sleep clinic to discuss appropriate treatment.  Weight loss advised secondary to BMI of 40.  Avoidance of alcohol, sedatives, and muscle relaxants, advised as these can exacerbate sleep disordered breathing.

## 2017-11-14 ENCOUNTER — OFFICE VISIT (OUTPATIENT)
Dept: PULMONOLOGY | Facility: HOSPICE | Age: 37
End: 2017-11-14
Payer: COMMERCIAL

## 2017-11-14 VITALS
HEART RATE: 85 BPM | WEIGHT: 260 LBS | DIASTOLIC BLOOD PRESSURE: 70 MMHG | BODY MASS INDEX: 40.81 KG/M2 | OXYGEN SATURATION: 96 % | SYSTOLIC BLOOD PRESSURE: 120 MMHG | HEIGHT: 67 IN | RESPIRATION RATE: 16 BRPM | TEMPERATURE: 98.2 F

## 2017-11-14 DIAGNOSIS — I27.20 PULMONARY HYPERTENSION (HCC): ICD-10-CM

## 2017-11-14 DIAGNOSIS — G47.33 OBSTRUCTIVE SLEEP APNEA HYPOPNEA, MILD: ICD-10-CM

## 2017-11-14 PROCEDURE — 99214 OFFICE O/P EST MOD 30 MIN: CPT | Performed by: INTERNAL MEDICINE

## 2017-11-14 NOTE — PROGRESS NOTES
Modesta Archuleta is a 37 y.o. female here for follow-up of pulmonary hypertension.    History of Present Illness:    The patient is a 37-year-old female who has pulmonary hypertension. She has had shortness of breath with exertion. Pulmonary function tests are normal. CT scan of the chest is unremarkable and there is no evidence of pulmonary emboli. Serologies for connective tissue disease were negative. As part of her evaluation she underwent a right heart catheterization in the hospital which showed evidence of increased LVEDP and a wedge pressure of 20. The mean pulmonary artery pressure was 44. She was started on Lasix and potassium. An overnight polysomnogram showed mild sleep apnea but her oxygen saturations decreased to 80%. She is trying to work on weight loss. She's lost 25 pounds. She still relatively inactive. Her blood pressures 120/70 today with a pulse of 85 and her oxygen saturation is 96% on room air.    Constitutional:  Negative for fever, chills, sweats, and fatigue.  Eyes:  Negative for eye pain and visual changes.  HENT:  Negative for tinnitus and hoarse voice.  Cardiovascular:  Negative for chest pain, leg swelling, syncope and orthopnea.  Respiratory:  See HPI for pertinent negatives  Sleep:  Negative for somnolence, loud snoring, sleep disturbance due to breathing, insomnia.  Gastrointestinal:  Negative for dysphagia, nausea and abdominal pain.  Heme/lymph:  Denies easy bruising, blood clots.  Musculoskeletal:  Negative for arthralgias, sore muscles and back pain.  Skin:  Negative for rash and color change.  Neurological:  Negative for headaches, lightheadedness and weakness.  Psychiatric:  Denies depression.    Current Outpatient Prescriptions   Medication Sig Dispense Refill   • furosemide (LASIX) 20 MG Tab Take 1 Tab by mouth every day. 60 Tab 0   • potassium chloride SA (K-DUR) 10 MEQ Tab CR Take 1 Tab by mouth every day. 60 Tab 1   • levonorgestrel-ethinyl estradiol (TRIVORA, 28,) per tablet  "Take 1 Tab by mouth every day.     • fluoxetine (PROZAC) 40 MG capsule Take 80 mg by mouth every day.       No current facility-administered medications for this visit.        Social History   Substance Use Topics   • Smoking status: Never Smoker   • Smokeless tobacco: Never Used   • Alcohol use Yes      Comment: once every 3 weeks       Past Medical History:   Diagnosis Date   • Anxiety and depression 2/12/2015   • Chickenpox    • Chronic diarrhea 2/12/2015   • Depression    • Ocular migraine 2/12/2015   • Sleep apnea        Past Surgical History:   Procedure Laterality Date   • PRIMARY C SECTION  11/13/2016    Procedure: PRIMARY C SECTION;  Surgeon: Delphine Carbone M.D.;  Location: LABOR AND DELIVERY;  Service:    • ARTHROSCOPY, KNEE     • TONSILLECTOMY         Allergies:  Erythromycin and Pcn [penicillins]    Family History   Problem Relation Age of Onset   • Sleep Apnea Mother    • Hypertension Father    • Heart Disease Father    • DVT Father        Physical Examination    Vitals:    11/14/17 0830   Height: 1.702 m (5' 7\")   Weight: 117.9 kg (260 lb)   Weight % change since last entry.: 0 %   BP: 120/70   Pulse: 85   BMI (Calculated): 40.72   Resp: 16   Temp: 36.8 °C (98.2 °F)   O2 sat % room air: 96 %       Physical Exam:  Constitutional:  Well developed and well nourished.  Head:  Normocephalic and atraumatic.  Nose:  Nose normal.  Mouth/Throat:  Oropharynx is clear and moist, no lesions.    Neck:  Normal range of motion.  Supple.  No JVD.  Cardiovascular:  Normal rate, regular rhythm, normal heart sounds. No edema  Pulmonary/Chest: No wheezing, rales or rhonchi.  Respiratory effort non labored  Musculoskeletal.  No muscular atrophy.  Lymphadenopathy:  No cervical or supraclavicular adenopathy  Neurological:  Alert and oriented.  Cranial nerves intact.  No focal deficits  Skin:  No rashes or ulcers.  Psyciatric:  Normal mood and affect.    Assessment and Plan:  1. Pulmonary hypertension  Pulmonary hypertension " is mild and related to diastolic dysfunction. She's now on Lasix and potassium. She is working on weight loss and I encouraged her to start an exercise program. She should monitor her blood pressures at home. If there is evidence of elevated blood pressure then antihypertensive medications will also be beneficial. Certainly sleep apnea is a component to her pulmonary hypertension. We will get her started on CPAP therapy and will check to see if she also needs oxygen with CPAP.    2. Obstructive sleep apnea hypopnea, mild  I started her on an AutoPap between 5 and 15 cm of water. We will check an overnight oximetry on CPAP therapy. If necessary we will prescribe oxygen with her CPAP. Recommend a two-month follow-up.  - DME CPAP  - DME [facility]        Followup Return in about 2 months (around 1/14/2018) for follow up with the pulmonary physician, follow up visit with VANDANA.

## 2017-11-21 ENCOUNTER — OFFICE VISIT (OUTPATIENT)
Dept: CARDIOLOGY | Facility: MEDICAL CENTER | Age: 37
End: 2017-11-21
Payer: COMMERCIAL

## 2017-11-21 VITALS
BODY MASS INDEX: 39.39 KG/M2 | DIASTOLIC BLOOD PRESSURE: 82 MMHG | SYSTOLIC BLOOD PRESSURE: 130 MMHG | HEART RATE: 84 BPM | WEIGHT: 251 LBS | HEIGHT: 67 IN

## 2017-11-21 DIAGNOSIS — I27.20 PULMONARY HYPERTENSION (HCC): ICD-10-CM

## 2017-11-21 DIAGNOSIS — G47.30 SLEEP APNEA, UNSPECIFIED TYPE: ICD-10-CM

## 2017-11-21 PROCEDURE — 99213 OFFICE O/P EST LOW 20 MIN: CPT | Performed by: INTERNAL MEDICINE

## 2017-11-21 ASSESSMENT — ENCOUNTER SYMPTOMS
PALPITATIONS: 0
NEUROLOGICAL NEGATIVE: 1
SHORTNESS OF BREATH: 0
WEIGHT LOSS: 1

## 2017-11-21 NOTE — PROGRESS NOTES
"Subjective:   Modesta Archuleta is a 37 y.o. female who presents today For follow-up of hypertension diagnosed during recent hospitalization.  Left and right heart catheterization revealed normal coronary arteries. Mean pulmonary artery pressure of 44, LV EDP of 21, mean capillary wedge pressure of 20, pulmonary artery pressure 57/36.  Since her hospitalization, the patient lost about 10 pounds. She has been evaluated for sleep apnea and is in the process of getting a BiPAP mask.  She is quite concerned about the findings from the cath lab and the fact that \"biventricular diastolic heart failure\" was mentioned in the cath report.  Oximetry today documents a patent right radial pulse.    Past Medical History:   Diagnosis Date   • Anxiety and depression 2/12/2015   • Chickenpox    • Chronic diarrhea 2/12/2015   • Depression    • Ocular migraine 2/12/2015   • Sleep apnea      Past Surgical History:   Procedure Laterality Date   • PRIMARY C SECTION  11/13/2016    Procedure: PRIMARY C SECTION;  Surgeon: Delphine Carbone M.D.;  Location: LABOR AND DELIVERY;  Service:    • ARTHROSCOPY, KNEE     • TONSILLECTOMY       Family History   Problem Relation Age of Onset   • Sleep Apnea Mother    • Hypertension Father    • Heart Disease Father    • DVT Father      History   Smoking Status   • Never Smoker   Smokeless Tobacco   • Never Used     Allergies   Allergen Reactions   • Erythromycin Unspecified     Pt reports \" crying for no reason, and my jaw locked years ago\"    • Pcn [Penicillins] Hives     Outpatient Encounter Prescriptions as of 11/21/2017   Medication Sig Dispense Refill   • furosemide (LASIX) 20 MG Tab Take 1 Tab by mouth every day. 60 Tab 0   • potassium chloride SA (K-DUR) 10 MEQ Tab CR Take 1 Tab by mouth every day. 60 Tab 1   • levonorgestrel-ethinyl estradiol (TRIVORA, 28,) per tablet Take 1 Tab by mouth every day.     • fluoxetine (PROZAC) 40 MG capsule Take 80 mg by mouth every day.       No facility-administered " "encounter medications on file as of 11/21/2017.      Review of Systems   Constitutional: Positive for weight loss.   Respiratory: Negative for shortness of breath.         History of sleep apnea.   Cardiovascular: Negative for chest pain and palpitations.   Neurological: Negative.         Objective:   /82   Pulse 84   Ht 1.702 m (5' 7\")   Wt 113.9 kg (251 lb)   BMI 39.31 kg/m²     Physical Exam   Constitutional: She is oriented to person, place, and time. She appears well-developed and well-nourished. No distress.   HENT:   Head: Atraumatic.   Eyes: Conjunctivae and EOM are normal. Pupils are equal, round, and reactive to light.   Neck: Neck supple. No JVD present.   Cardiovascular: Normal rate and regular rhythm.    No murmur heard.  Pulmonary/Chest: Effort normal and breath sounds normal. No respiratory distress. She has no wheezes. She has no rales.   Abdominal: Soft. There is no tenderness.   Obese   Musculoskeletal: She exhibits no edema.   Neurological: She is alert and oriented to person, place, and time.   Skin: Skin is warm and dry. She is not diaphoretic.       Assessment:     1. Pulmonary hypertension     2. Sleep apnea, unspecified type         Medical Decision Making:  Today's Assessment / Status / Plan:   Patient is moderate pulmonary hypertension secondary to obesity and sleep apnea. She was congratulated on her weight loss. The patient is a process of getting setup with a BiPAP mask. The RV pressure and angiography correlated very well with the echo.  Discussed with loss and exercise. Recommend repeat echo in one year to evaluate her pulmonary artery pressures. Also recommend that she stop her furosemide and potassium when she gets her BiPAP mask.  "

## 2017-11-27 ENCOUNTER — NON-PROVIDER VISIT (OUTPATIENT)
Dept: HEALTH INFORMATION MANAGEMENT | Facility: MEDICAL CENTER | Age: 37
End: 2017-11-27
Payer: COMMERCIAL

## 2017-11-27 VITALS — WEIGHT: 251 LBS | HEIGHT: 67 IN | BODY MASS INDEX: 39.39 KG/M2

## 2017-11-27 DIAGNOSIS — E66.01 MORBID OBESITY WITH BMI OF 40.0-44.9, ADULT (HCC): ICD-10-CM

## 2017-11-27 PROCEDURE — 97802 MEDICAL NUTRITION INDIV IN: CPT | Performed by: DIETITIAN, REGISTERED

## 2017-11-27 NOTE — PROGRESS NOTES
"11/27/2017 37 y.o.   Referring Provider: Quynh Joel M.D.       Time in/out:  10:00-10:54am     Anthropometrics/Objective  Vitals:    11/27/17 1049   Weight: 113.9 kg (251 lb)   Height: 1.702 m (5' 7\")       Body mass index is 39.31 kg/m².    Stated Goal Weight:  210lb   Estimated Daily Caloric needs  ~1800  Kcal based on MSJ    See comprehensive patient history form for further information     Subjective:  Pt states she usually eats only 1 meal a day (lunch).   Is not hungry in the AM and doesn't like breakfast foods.   Has tried meal replacements and protein shakes in the past but doesn't like the flavor.   Rarely snacks, but when she does has cheese, or sweets brought in to work.   Was drinking mountain dew several times a day but recently cut that out and is drinking fritz drinks now. Doesn't like plain water.   Doesn't like eggs, or peanut butter, or   Has done Weight Watchers in the past. Occasionally uses their marino to track points. Goal is 44 points a day.   Diet hx:   B- none . Or KIND bar or banana or muffin   L- chicken asian salad with crispy chicken   S- cheese and grapes or sweets from coworkers   D- skips . Or has pizza or fish   Weight hx--- has lost 25lb since the birth of her son in May     Nutrition Diagnosis (PES Statement)  · Food/nutrition knowledge deficit related to no previous nutrition education as evidenced by limited nutrition knowledge per discussion with patient   · Obesity related to excessive energy intake and inadequate energy expenditure as evidenced by BMI >30      Client history:  Condition(s) associated with a diagnosis or treatment (specify) Obesity, pulmonary HTN, sleep apnea     Biochemical data, medical test and procedures  Lab Results   Component Value Date/Time    HBA1C 5.8 (H) 10/17/2016 11:49 AM   @  No results found for: POCGLUCOSE  Lab Results   Component Value Date/Time    CHOLSTRLTOT 180 09/07/2017 12:03 AM     (H) 09/07/2017 12:03 AM    HDL 39 (A) 09/07/2017 " "12:03 AM    TRIGLYCERIDE 151 (H) 09/07/2017 12:03 AM         Nutrition Intervention    Meal and Snack  Recommend a general/healthful diet     Comprehensive Nutrition education Instruction or training leading to in-depth nutrition related knowledge about:  Combine carb, protein and fat at each meal, Eating out, Meal timing and spacing, Menu Planning, Metabolism of carb, protein, fat, Physical activity/exercise, Portion control, Sweets and alcohol in moderation, Heart-healthy guidelines, Label Reading, Handouts provided regarding topics discussed and Theraputic diet for \"low sodium diet\" for HTN     Monitoring & Evaluation Plan    Behavioral-Environmental:  Behavior : use weight watchers marino to track food and beverage intake. Plan out dinners for the week.   Physical activity : to be discussed next appt     Food / Nutrient Intake:  Fluid/Beverage intake: avoid sweetened beverages like [employer] frappachino.   Food intake : Eat 3 meals a day and 1-2 snacks. Bring left overs for lunch or adjust salad to be grilled chicken and reduce salad dressing portion. Breakfast daily -- 1 KIND bar and 1 yogurt , or try a protein drink.   Avoid concentrated sweets and refined grains/flours.   Avoid highly processed, salt meats. No added salt. Avoid foods with >20% DV sodium per serving to keep daily intake under 2000mg.     Physical Signs / Symptoms:  Weight change : -1-2lb per week to achieve goal weight of 210lb.     Assessment Notes:   Modesta has decided she would like to lose weight because she is concerned about her health. She was told she had heart failure which scared her however upon seeing a cardiologist that diagnosis was ruled out. She does have HTN and wants to lose weight to stay healthy to be there for her son.   Pt will keep a food journal and work on the goals we had discussed (see above). We will review her progress next appt. Pt's is on the contemplative stage of change and does not seem confident in herself to make " these lifestyle changes.   She may benefit from seeing Dr. Johns for the medical weight loss program and may need a more drastic diet short term before she is ready for lifestyle changes. Will assess/discuss this next appt.     Follow up 2 weeks   Lucille Mason RD, LD, CDE  [phone]

## 2017-12-12 ENCOUNTER — APPOINTMENT (OUTPATIENT)
Dept: HEALTH INFORMATION MANAGEMENT | Facility: MEDICAL CENTER | Age: 37
End: 2017-12-12
Payer: COMMERCIAL

## 2018-01-17 ENCOUNTER — TELEPHONE (OUTPATIENT)
Dept: MEDICAL GROUP | Facility: PHYSICIAN GROUP | Age: 38
End: 2018-01-17

## 2018-01-17 DIAGNOSIS — G47.30 SLEEP-DISORDERED BREATHING: ICD-10-CM

## 2018-01-17 NOTE — TELEPHONE ENCOUNTER
----- Message from Krystin Nevarez sent at 1/17/2018  9:35 AM PST -----  Regarding: REFERRAL NEEDED   Good Morning,    The attached pt has a follow up visit with [facility]. Can you please place a referral. Pt has appt on 1/22/18.    Than you,  Krystin Meza

## 2018-01-22 ENCOUNTER — APPOINTMENT (OUTPATIENT)
Dept: SLEEP MEDICINE | Facility: MEDICAL CENTER | Age: 38
End: 2018-01-22
Payer: COMMERCIAL

## 2018-02-07 ENCOUNTER — PATIENT OUTREACH (OUTPATIENT)
Dept: HEALTH INFORMATION MANAGEMENT | Facility: OTHER | Age: 38
End: 2018-02-07

## 2018-02-08 ENCOUNTER — PATIENT OUTREACH (OUTPATIENT)
Dept: HEALTH INFORMATION MANAGEMENT | Facility: OTHER | Age: 38
End: 2018-02-08

## 2018-02-08 DIAGNOSIS — Z71.89 COMPLEX CARE COORDINATION: ICD-10-CM

## 2018-03-01 ENCOUNTER — OFFICE VISIT (OUTPATIENT)
Dept: MEDICAL GROUP | Facility: PHYSICIAN GROUP | Age: 38
End: 2018-03-01
Payer: COMMERCIAL

## 2018-03-01 VITALS
OXYGEN SATURATION: 97 % | DIASTOLIC BLOOD PRESSURE: 88 MMHG | TEMPERATURE: 97.8 F | HEIGHT: 67 IN | WEIGHT: 256 LBS | SYSTOLIC BLOOD PRESSURE: 140 MMHG | HEART RATE: 70 BPM | RESPIRATION RATE: 18 BRPM | BODY MASS INDEX: 40.18 KG/M2

## 2018-03-01 DIAGNOSIS — I27.20 PULMONARY HYPERTENSION (HCC): ICD-10-CM

## 2018-03-01 DIAGNOSIS — E66.01 MORBID OBESITY WITH BMI OF 40.0-44.9, ADULT (HCC): ICD-10-CM

## 2018-03-01 DIAGNOSIS — I10 ESSENTIAL HYPERTENSION: ICD-10-CM

## 2018-03-01 DIAGNOSIS — G47.30 SLEEP APNEA, UNSPECIFIED TYPE: ICD-10-CM

## 2018-03-01 PROCEDURE — 99214 OFFICE O/P EST MOD 30 MIN: CPT | Performed by: FAMILY MEDICINE

## 2018-03-01 RX ORDER — SPIRONOLACTONE 25 MG/1
25 TABLET ORAL DAILY
Qty: 30 TAB | Refills: 3 | Status: SHIPPED | OUTPATIENT
Start: 2018-03-01 | End: 2018-04-10 | Stop reason: SDUPTHER

## 2018-03-01 NOTE — PROGRESS NOTES
Chief Complaint   Patient presents with   • Hypertension       HISTORY OF PRESENT ILLNESS: Patient is a 37 y.o. female established patient here today for the following concerns:    1. Essential hypertension  Here today for follow upon BP.  Reports that it has been quite high this last month in the 140-150/90s.  No HA, visual changes, or CP.  She does have hx of TIMI and has not been using CPAP recently.  She also left her  at the beginning of the month.      2. Sleep apnea, unspecified type  3. Pulmonary hypertension  Hx of pulmonary HTN, TIMI previously on CPAP, planning to restart it.  Has follow up in May for Pulmonology.     4. Morbid obesity with BMI of 40.0-44.9, adult ([facility])  Counseled today.      Past Medical, Social, and Family history reviewed and updated in EPIC    Allergies:Erythromycin and Pcn [penicillins]    Current Outpatient Prescriptions   Medication Sig Dispense Refill   • spironolactone (ALDACTONE) 25 MG Tab Take 1 Tab by mouth every day. 30 Tab 3   • levonorgestrel-ethinyl estradiol (TRIVORA, 28,) per tablet Take 1 Tab by mouth every day.     • fluoxetine (PROZAC) 40 MG capsule Take 80 mg by mouth every day.       No current facility-administered medications for this visit.          ROS:  Review of Systems   Constitutional: Negative for fever, chills, weight loss and malaise/fatigue.   HENT: Negative for ear pain, nosebleeds, congestion, sore throat and neck pain.    Eyes: Negative for blurred vision.   Respiratory: Negative for cough, sputum production, shortness of breath and wheezing.    Cardiovascular: Negative for chest pain, palpitations,  and leg swelling.   Gastrointestinal: Negative for heartburn, nausea, vomiting, diarrhea and abdominal pain.   Genitourinary: Negative for dysuria, urgency and frequency.   Musculoskeletal: Negative for myalgias, back pain and joint pain.   Skin: Negative for rash and itching.   Neurological: Negative for dizziness, tingling, tremors, sensory change,  "focal weakness and headaches.   Endo/Heme/Allergies: Does not bruise/bleed easily.   Psychiatric/Behavioral: Negative for depression, anxiety, suicidal ideas, insomnia and memory loss.      Exam:  Blood pressure 140/88, pulse 70, temperature 36.6 °C (97.8 °F), resp. rate 18, height 1.702 m (5' 7\"), weight 116.1 kg (256 lb), last menstrual period 02/27/2018, SpO2 97 %, not currently breastfeeding.    General:  Well nourished, well developed in NAD  Head is grossly normal.  Neck: Supple without JVD   Pulmonary:  Normal effort.   Cardiovascular: Regular rate  Extremities: no clubbing, cyanosis, or edema.  Psych: affect appropriate      Please note that this dictation was created using voice recognition software. I have made every reasonable attempt to correct obvious errors, but I expect that there are errors of grammar and possibly content that I did not discover before finalizing the note.    Assessment/Plan:  1. Essential hypertension  Start   - spironolactone (ALDACTONE) 25 MG Tab; Take 1 Tab by mouth every day.  Dispense: 30 Tab; Refill: 3  Check labs in 2 weeks   - COMP METABOLIC PANEL; Future    2. Sleep apnea, unspecified type  Restart CPAP, follow up with pulmonology     3. Pulmonary hypertension  Will add spironolactone for BP control, but also to try to help some of the pulmonary hypertension and treat any underlying PCOS.  Advised to restart CPAP and weight reduction.    4. Morbid obesity with BMI of 40.0-44.9, adult (HCC)  weight reduction.    1 month follow up        "

## 2018-03-08 ENCOUNTER — HOSPITAL ENCOUNTER (OUTPATIENT)
Dept: LAB | Facility: MEDICAL CENTER | Age: 38
End: 2018-03-08
Attending: FAMILY MEDICINE
Payer: COMMERCIAL

## 2018-03-08 ENCOUNTER — PATIENT OUTREACH (OUTPATIENT)
Dept: HEALTH INFORMATION MANAGEMENT | Facility: OTHER | Age: 38
End: 2018-03-08

## 2018-03-08 DIAGNOSIS — I10 ESSENTIAL HYPERTENSION: ICD-10-CM

## 2018-03-08 LAB
ALBUMIN SERPL BCP-MCNC: 4.4 G/DL (ref 3.2–4.9)
ALBUMIN/GLOB SERPL: 1.7 G/DL
ALP SERPL-CCNC: 86 U/L (ref 30–99)
ALT SERPL-CCNC: 18 U/L (ref 2–50)
ANION GAP SERPL CALC-SCNC: 8 MMOL/L (ref 0–11.9)
AST SERPL-CCNC: 15 U/L (ref 12–45)
BILIRUB SERPL-MCNC: 0.3 MG/DL (ref 0.1–1.5)
BUN SERPL-MCNC: 9 MG/DL (ref 8–22)
CALCIUM SERPL-MCNC: 9.6 MG/DL (ref 8.5–10.5)
CHLORIDE SERPL-SCNC: 107 MMOL/L (ref 96–112)
CO2 SERPL-SCNC: 26 MMOL/L (ref 20–33)
CREAT SERPL-MCNC: 0.99 MG/DL (ref 0.5–1.4)
GLOBULIN SER CALC-MCNC: 2.6 G/DL (ref 1.9–3.5)
GLUCOSE SERPL-MCNC: 62 MG/DL (ref 65–99)
POTASSIUM SERPL-SCNC: 4 MMOL/L (ref 3.6–5.5)
PROT SERPL-MCNC: 7 G/DL (ref 6–8.2)
SODIUM SERPL-SCNC: 141 MMOL/L (ref 135–145)

## 2018-03-08 PROCEDURE — 80053 COMPREHEN METABOLIC PANEL: CPT

## 2018-03-08 PROCEDURE — 36415 COLL VENOUS BLD VENIPUNCTURE: CPT

## 2018-03-08 NOTE — PROGRESS NOTES
Outreach call to patient to discuss Care Coordination program. Reached patient as she was leaving work. She did request a call back , call rescheduled.

## 2018-03-27 ENCOUNTER — PATIENT OUTREACH (OUTPATIENT)
Dept: HEALTH INFORMATION MANAGEMENT | Facility: OTHER | Age: 38
End: 2018-03-27

## 2018-03-27 NOTE — PROGRESS NOTES
Outcome: Left Message    Please transfer to Patient Outreach Team at [phone] when patient returns call.  [facility] Verified: yes    Attempt # 1

## 2018-04-04 ENCOUNTER — PATIENT OUTREACH (OUTPATIENT)
Dept: HEALTH INFORMATION MANAGEMENT | Facility: OTHER | Age: 38
End: 2018-04-04

## 2018-04-10 ENCOUNTER — OFFICE VISIT (OUTPATIENT)
Dept: MEDICAL GROUP | Facility: PHYSICIAN GROUP | Age: 38
End: 2018-04-10
Payer: COMMERCIAL

## 2018-04-10 VITALS
HEIGHT: 67 IN | SYSTOLIC BLOOD PRESSURE: 122 MMHG | DIASTOLIC BLOOD PRESSURE: 86 MMHG | RESPIRATION RATE: 14 BRPM | BODY MASS INDEX: 40.97 KG/M2 | OXYGEN SATURATION: 97 % | HEART RATE: 70 BPM | TEMPERATURE: 97.7 F | WEIGHT: 261 LBS

## 2018-04-10 DIAGNOSIS — I10 ESSENTIAL HYPERTENSION: ICD-10-CM

## 2018-04-10 DIAGNOSIS — I27.20 PULMONARY HYPERTENSION (HCC): ICD-10-CM

## 2018-04-10 DIAGNOSIS — E66.01 MORBID OBESITY WITH BMI OF 40.0-44.9, ADULT (HCC): ICD-10-CM

## 2018-04-10 DIAGNOSIS — E88.819 INSULIN RESISTANCE: ICD-10-CM

## 2018-04-10 PROBLEM — J96.10 CHRONIC RESPIRATORY FAILURE (HCC): Status: RESOLVED | Noted: 2017-11-08 | Resolved: 2018-04-10

## 2018-04-10 PROCEDURE — 99214 OFFICE O/P EST MOD 30 MIN: CPT | Performed by: FAMILY MEDICINE

## 2018-04-10 RX ORDER — SPIRONOLACTONE 25 MG/1
25 TABLET ORAL DAILY
Qty: 90 TAB | Refills: 3 | Status: SHIPPED | OUTPATIENT
Start: 2018-04-10 | End: 2018-11-27

## 2018-04-10 RX ORDER — METFORMIN HYDROCHLORIDE 500 MG/1
500 TABLET, EXTENDED RELEASE ORAL DAILY
Qty: 90 TAB | Refills: 3 | Status: SHIPPED | OUTPATIENT
Start: 2018-04-10 | End: 2018-05-10

## 2018-04-10 NOTE — LETTER
April 10, 2018        RE: Modesta Archuleta      Please discontinue oxygen therapy for Modesta as of today.                        Quynh Joel MD

## 2018-04-10 NOTE — PROGRESS NOTES
"Chief Complaint   Patient presents with   • Blood Sugar Problem     fv labs   • Blood Pressure Problem     med refill       HISTORY OF PRESENT ILLNESS: Patient is a 37 y.o. female established patient here today for the following concerns:    1. Essential hypertension  Here today for follow up.  Had labs done which showed normal potassium.  Pressure has improved significantly.  She is tolerating the meds just fine.      2. Insulin resistance  It was found that her blood sugar dropped a bit low presumably 2-3 hours after eating.  She reports that she \"loves carbs\".  She has had impaired fasting glucose.  She is willing to start medication.  She did have one visit with a nutritionist, but reports that she already knew the information, she just needed to put it into practice.      3. Morbid obesity with BMI of 40.0-44.9, adult ([facility])  Counseled again today.  Goal of reducing carbohydrates daily, increase protein.  Portion control.  Regular exercise (walking).  Goal of 15 lbs down in 3 months.     4. Pulmonary hypertension  Noted previously.  Followed by pulmonology now.  Requests that we d/c the O2 as she is not using it.  No further episodes of gasping and will no longer use the machine.       Past Medical, Social, and Family history reviewed and updated in EPIC    Allergies:Erythromycin and Pcn [penicillins]    Current Outpatient Prescriptions   Medication Sig Dispense Refill   • spironolactone (ALDACTONE) 25 MG Tab Take 1 Tab by mouth every day. 90 Tab 3   • metFORMIN ER (GLUCOPHAGE XR) 500 MG TABLET SR 24 HR Take 1 Tab by mouth every day. 90 Tab 3   • levonorgestrel-ethinyl estradiol (TRIVORA, 28,) per tablet Take 1 Tab by mouth every day.     • fluoxetine (PROZAC) 40 MG capsule Take 80 mg by mouth every day.       No current facility-administered medications for this visit.          ROS:  Review of Systems   Constitutional: Negative for fever, chills, weight loss and malaise/fatigue.   HENT: Negative for ear pain, " "nosebleeds, congestion, sore throat and neck pain.    Eyes: Negative for blurred vision.   Respiratory: Negative for cough, sputum production, shortness of breath and wheezing.    Cardiovascular: Negative for chest pain, palpitations,  and leg swelling.   Gastrointestinal: Negative for heartburn, nausea, vomiting, diarrhea and abdominal pain.   Genitourinary: Negative for dysuria, urgency and frequency.   Musculoskeletal: Negative for myalgias, back pain and joint pain.   Skin: Negative for rash and itching.   Neurological: Negative for dizziness, tingling, tremors, sensory change, focal weakness and headaches.   Endo/Heme/Allergies: Does not bruise/bleed easily.   Psychiatric/Behavioral: Negative for depression, anxiety, suicidal ideas, insomnia and memory loss.      Exam:  Blood pressure 122/86, pulse 70, temperature 36.5 °C (97.7 °F), resp. rate 14, height 1.702 m (5' 7\"), weight 118.4 kg (261 lb), last menstrual period 04/04/2018, SpO2 97 %, not currently breastfeeding.    General:  Well nourished, well developed in NAD  Head is grossly normal.  Neck: Supple without JVD   Pulmonary:  Normal effort.   Cardiovascular: Regular rate  Extremities: no clubbing, cyanosis, or edema.  Psych: affect appropriate      Please note that this dictation was created using voice recognition software. I have made every reasonable attempt to correct obvious errors, but I expect that there are errors of grammar and possibly content that I did not discover before finalizing the note.    Assessment/Plan:  1. Essential hypertension  - spironolactone (ALDACTONE) 25 MG Tab; Take 1 Tab by mouth every day.  Dispense: 90 Tab; Refill: 3    2. Insulin resistance  - metFORMIN ER (GLUCOPHAGE XR) 500 MG TABLET SR 24 HR; Take 1 Tab by mouth every day.  Dispense: 90 Tab; Refill: 3    3. Morbid obesity with BMI of 40.0-44.9, adult (HCC)  - Patient identified as having weight management issue.  Appropriate orders and counseling given.    4. " Pulmonary hypertension  D/c O2 per patient request.     3 month follow up

## 2018-04-16 ENCOUNTER — PATIENT OUTREACH (OUTPATIENT)
Dept: HEALTH INFORMATION MANAGEMENT | Facility: OTHER | Age: 38
End: 2018-04-16

## 2018-04-24 NOTE — PROGRESS NOTES
Outcome: Left Message    Please transfer to Patient Outreach Team at [phone] when patient returns call.    WebIZ Checked & Epic Updated:  no    HealthConnect Verified: no    Attempt # 2ND ATTEMPT

## 2018-05-01 NOTE — PROGRESS NOTES
Outcome: Left Message    Please transfer to Patient Outreach Team at [phone] when patient returns call.    WebIZ Checked & Epic Updated:  no    HealthConnect Verified: no    Attempt # 3RD ATTEMPT

## 2018-05-10 ENCOUNTER — OFFICE VISIT (OUTPATIENT)
Dept: MEDICAL GROUP | Facility: PHYSICIAN GROUP | Age: 38
End: 2018-05-10
Payer: COMMERCIAL

## 2018-05-10 VITALS
SYSTOLIC BLOOD PRESSURE: 124 MMHG | HEART RATE: 82 BPM | DIASTOLIC BLOOD PRESSURE: 68 MMHG | BODY MASS INDEX: 41.12 KG/M2 | WEIGHT: 262 LBS | HEIGHT: 67 IN | TEMPERATURE: 97.9 F | OXYGEN SATURATION: 93 %

## 2018-05-10 DIAGNOSIS — F41.9 ANXIETY AND DEPRESSION: ICD-10-CM

## 2018-05-10 DIAGNOSIS — J06.9 UPPER RESPIRATORY TRACT INFECTION, UNSPECIFIED TYPE: ICD-10-CM

## 2018-05-10 DIAGNOSIS — F32.A ANXIETY AND DEPRESSION: ICD-10-CM

## 2018-05-10 PROBLEM — R09.02 HYPOXIA: Status: RESOLVED | Noted: 2017-11-05 | Resolved: 2018-05-10

## 2018-05-10 PROBLEM — D72.829 LEUKOCYTOSIS: Status: RESOLVED | Noted: 2017-11-05 | Resolved: 2018-05-10

## 2018-05-10 PROCEDURE — 99214 OFFICE O/P EST MOD 30 MIN: CPT | Performed by: NURSE PRACTITIONER

## 2018-05-10 RX ORDER — FLUTICASONE PROPIONATE 50 MCG
2 SPRAY, SUSPENSION (ML) NASAL DAILY
Qty: 16 G | Refills: 0 | Status: SHIPPED | OUTPATIENT
Start: 2018-05-10 | End: 2018-07-11

## 2018-05-10 RX ORDER — DOXYCYCLINE HYCLATE 100 MG
100 TABLET ORAL 2 TIMES DAILY
Qty: 14 TAB | Refills: 0 | Status: SHIPPED | OUTPATIENT
Start: 2018-05-10 | End: 2018-07-11

## 2018-05-10 ASSESSMENT — PATIENT HEALTH QUESTIONNAIRE - PHQ9
SUM OF ALL RESPONSES TO PHQ QUESTIONS 1-9: 12
5. POOR APPETITE OR OVEREATING: 3 - NEARLY EVERY DAY
CLINICAL INTERPRETATION OF PHQ2 SCORE: 3

## 2018-05-10 NOTE — PROGRESS NOTES
Subjective:     Chief Complaint   Patient presents with   • Cold Symptoms     cought,muchus,conjusted       HPI  Modesta Archuleta is a 38 y.o. female here today for respiratory infection. Symptoms started Sunday. Symptoms are worsening. Started with sore throat and rhinorrhea with clear drainage. No sinus pressure or headache. Now symptoms have worsened and moved into her chest as of yesterday with cough with thick green mucus. +fever yesterday 99.9, and chills. No sob, dyspnea, hemoptysis, or wheezing. No chest pain, LE edema, or near-syncope.   Back in fall 2017 she was on oxygen with diagnosis of chronic respiratory failure with oxygen therapy, sleep apnea, and pulm HTN. In review of records it appears that patient had pulmonary hypertension secondary to uncontrolled sleep apnea resulting in hypoxia. She is no longer wearing oxygen and oxygen levels today are slightly lower than her baseline, but still in normal range. Chest x-rays during her episode in 2017 were all negative. She has appointment with pulmonology tomorrow.   She tried Claritin which offered no relief. Has been using Sudafed with mild relief.  She is concerned about her 1 year old getting sick with this.     Anxiety and depression  Ongoing chronic issue currently on fluoxetine. Followed by PCP.        Diagnoses of Upper respiratory tract infection, unspecified type and Anxiety and depression were pertinent to this visit.    Allergies: Erythromycin and Pcn [penicillins]  Current medicines (including changes today)  Current Outpatient Prescriptions   Medication Sig Dispense Refill   • doxycycline (VIBRAMYCIN) 100 MG Tab Take 1 Tab by mouth 2 times a day. 14 Tab 0   • fluticasone (FLONASE) 50 MCG/ACT nasal spray Spray 2 Sprays in nose every day. 16 g 0   • spironolactone (ALDACTONE) 25 MG Tab Take 1 Tab by mouth every day. 90 Tab 3   • levonorgestrel-ethinyl estradiol (TRIVORA, 28,) per tablet Take 1 Tab by mouth every day.     • fluoxetine (PROZAC)  "40 MG capsule Take 80 mg by mouth every day.       No current facility-administered medications for this visit.        She  has a past medical history of Anxiety and depression (2/12/2015); Chickenpox; Chronic diarrhea (2/12/2015); Depression; Ocular migraine (2/12/2015); and Sleep apnea. She also has no past medical history of ASTHMA or Diabetes.        ROS  As stated in HPI      Objective:     Blood pressure 124/68, pulse 82, temperature 36.6 °C (97.9 °F), height 1.702 m (5' 7\"), weight 118.8 kg (262 lb), SpO2 93 %. Body mass index is 41.04 kg/m².  Physical Exam:  General: Alert, oriented, in no acute distress.  Eye contact is good, speech goal directed, affect calm  CNs grossly intact.  HEENT: conjunctiva non-injected, sclera non-icteric, EOMs intact.  No lid edema or eye drainage.   Pinna normal without skin lesions. TM pearly toribio. Gross hearing intact.  Nasal turbinates without edema. Small clear/white drainage present  Oral mucous membranes pink and moist with no lesions. Oropharynx without erythema, or exudate.   No TTP over frontal or maxillary sinuses  Neck: Supple. No adenopathy or masses in the cervical or supraclavicular regions.  Lungs: unlabored. clear to auscultation bilaterally with good excursion.  CV: regular rate and rhythm. No murmurs.  Ext: no edema, normal color and temperature.   Skin: No rashes or lesions in visible areas  Gait steady.     Assessment and Plan:   Assessment/Plan:  1. Upper respiratory tract infection, unspecified type  Discussed with patient that I still think she might be fighting a viral syndrome. I would like her to wait it out another couple of days to see if symptoms improve. In the meantime, continue using Sudafed or Mucinex D, rest, adequate fluids, and Tylenol for headache or fever. Discussed prevention of transmission to her little one. May start antibiotics at symptoms worsen or if they persist without improvement by Saturday.  - doxycycline (VIBRAMYCIN) 100 MG Tab; " Take 1 Tab by mouth 2 times a day.  Dispense: 14 Tab; Refill: 0  - fluticasone (FLONASE) 50 MCG/ACT nasal spray; Spray 2 Sprays in nose every day.  Dispense: 16 g; Refill: 0    2. Anxiety and depression  Patient states she does not need assistance with her depression today. She will continue following up with her PCP for this. No suicidal thoughts.  - Patient has been identified as being depressed and appropriate orders and counseling have been given       Follow up:  Return in about 1 week (around 5/17/2018).    Educated in proper administration of medication(s) ordered today including safety, possible SE, risks, benefits, rationale and alternatives to therapy.   Supportive care, differential diagnoses, and indications for immediate follow-up discussed with patient.    Pathogenesis of diagnosis discussed including typical length and natural progression.    Instructed to return to clinic or nearest emergency department for any change in condition, further concerns, or worsening of symptoms.  Patient states understanding of the plan of care and discharge instructions.      Please note that this dictation was created using voice recognition software. I have made every reasonable attempt to correct obvious errors, but I expect that there are errors of grammar and possibly content that I did not discover before finalizing the note.    Followup: Return in about 1 week (around 5/17/2018). sooner should new symptoms or problems arise.

## 2018-05-11 ENCOUNTER — SLEEP CENTER VISIT (OUTPATIENT)
Dept: SLEEP MEDICINE | Facility: MEDICAL CENTER | Age: 38
End: 2018-05-11
Payer: COMMERCIAL

## 2018-05-11 VITALS
OXYGEN SATURATION: 96 % | RESPIRATION RATE: 15 BRPM | SYSTOLIC BLOOD PRESSURE: 122 MMHG | BODY MASS INDEX: 41.12 KG/M2 | WEIGHT: 262 LBS | HEART RATE: 76 BPM | HEIGHT: 67 IN | DIASTOLIC BLOOD PRESSURE: 78 MMHG

## 2018-05-11 DIAGNOSIS — G47.33 OSA (OBSTRUCTIVE SLEEP APNEA): ICD-10-CM

## 2018-05-11 DIAGNOSIS — E66.01 MORBID OBESITY WITH BMI OF 40.0-44.9, ADULT (HCC): ICD-10-CM

## 2018-05-11 DIAGNOSIS — I27.20 PULMONARY HYPERTENSION (HCC): ICD-10-CM

## 2018-05-11 PROCEDURE — 99213 OFFICE O/P EST LOW 20 MIN: CPT | Performed by: NURSE PRACTITIONER

## 2018-05-11 NOTE — PROGRESS NOTES
Chief Complaint   Patient presents with   • Follow-Up     2 Month FV         HPI: This patient is a 38 y.o. female, who presents for follow-up of TIMI.    Patient was last seen in November with Dr. Blackwell.  She was initially referred to our clinic for further evaluation of pulmonary hypertension.  Patient reported dyspnea and irregular heart rate.  Echocardiogram revealed elevated pulmonary pressure.  She was seen by cardiology and evaluated with right heart catheterization showing evidence of increased LVEDP and wedge pressure, the mean pulmonary artery pressure was 44.  She was started on Lasix and potassium.  CTA showed no evidence of PE, serology for CTD was negative, HIV panel negative, PFTs were normal.  PSG revealed mild sleep apnea.  She was initiated on auto CPAP after her last visit.  She has tried but is intolerant to the therapy.  She does not feel the pressure or the mask is comfortable she has had significant amount of life stress.   from her significant other, is now taking care of her 1-year-old by herself.    Past Medical History:   Diagnosis Date   • Anxiety and depression 2/12/2015   • Chickenpox    • Chronic diarrhea 2/12/2015   • Depression    • Ocular migraine 2/12/2015   • Sleep apnea        Social History   Substance Use Topics   • Smoking status: Never Smoker   • Smokeless tobacco: Never Used   • Alcohol use No       Family History   Problem Relation Age of Onset   • Sleep Apnea Mother    • Hypertension Father    • Heart Disease Father    • DVT Father        Current medications as of today   Current Outpatient Prescriptions   Medication Sig Dispense Refill   • spironolactone (ALDACTONE) 25 MG Tab Take 1 Tab by mouth every day. 90 Tab 3   • levonorgestrel-ethinyl estradiol (TRIVORA, 28,) per tablet Take 1 Tab by mouth every day.     • fluoxetine (PROZAC) 40 MG capsule Take 80 mg by mouth every day.     • doxycycline (VIBRAMYCIN) 100 MG Tab Take 1 Tab by mouth 2 times a day. 14 Tab 0   •  "fluticasone (FLONASE) 50 MCG/ACT nasal spray Spray 2 Sprays in nose every day. 16 g 0     No current facility-administered medications for this visit.        Allergies: Erythromycin and Pcn [penicillins]    Blood pressure 122/78, pulse 76, resp. rate 15, height 1.702 m (5' 7\"), weight 118.8 kg (262 lb), SpO2 96 %.      ROS: As per HPI and otherwise negative if not stated.      Physical exam:   Constitutional: obese, in no acute distress  Eyes: PERRL  Neck: supple, trachea midline  Respiratory: no intercostal retractions or accessory muscle use   Musculoskeletal: no clubbing or cyanosis  Skin: No rashes or lesions noted on exposed skin  Neuro: No focal deficit noted  Psychiatric: Oriented to time, person and place.     Diagnosis:  1. TIMI (obstructive sleep apnea)  DME MASK AND SUPPLIES   2. Morbid obesity with BMI of 40.0-44.9, adult (HCC)     3. Pulmonary hypertension (HCC)         Plan:  Patient has had a thorough workup for pulmonary hypertension.  Pulmonary hypertension likely related to weight and sleep apnea.  I strongly encouraged CPAP use.  She is amendable to retrying CPAP with a different mask.  In the meantime she would like to also pursue a dental appliance.  She understands CPAP therapy is preferred however given her intolerance to cpap in the past, dental appliance is preferable to no treatment.  She follows with cardiology and will require an updated echocardiogram in September.      1.  Order for mask fitting, nasal mask to [facility]  2.  Patient urged to restart CPAP therapy  3.  Referral to Dr. Claros to explore dental appliance  4.  Follow-up in 3 months  5.  Weight loss strongly encouraged    "

## 2018-06-23 NOTE — PROGRESS NOTES
Outcome: Left Message    Please transfer to Patient Outreach Team at [phone] when patient returns call.    WebIZ Checked & Epic Updated:  yes    HealthConnect Verified: yes    Attempt # 4TH ATTEMPT

## 2018-06-27 ENCOUNTER — TELEPHONE (OUTPATIENT)
Dept: PULMONOLOGY | Facility: HOSPICE | Age: 38
End: 2018-06-27

## 2018-06-27 DIAGNOSIS — G47.33 OSA (OBSTRUCTIVE SLEEP APNEA): ICD-10-CM

## 2018-06-27 NOTE — TELEPHONE ENCOUNTER
Shanon from Dr. Claros's office called and stated this patient's P plan ([employer]) requires a formal referral request be submitted directly to the insurance company     Eileen @ [facility] gave Shanon this information    [phone] - # for Eileen      [phone] (Shanon @ Dr. Claros's office)

## 2018-07-03 NOTE — PROGRESS NOTES
Outcome: Left Message    Please transfer to Patient Outreach Team at [phone] when patient returns call.    WebIZ Checked & Epic Updated:  yes    HealthConnect Verified: yes    Attempt # 5TH ATTEMPT

## 2018-07-11 ENCOUNTER — OFFICE VISIT (OUTPATIENT)
Dept: MEDICAL GROUP | Facility: PHYSICIAN GROUP | Age: 38
End: 2018-07-11
Payer: COMMERCIAL

## 2018-07-11 VITALS
RESPIRATION RATE: 14 BRPM | SYSTOLIC BLOOD PRESSURE: 110 MMHG | BODY MASS INDEX: 41.91 KG/M2 | OXYGEN SATURATION: 95 % | TEMPERATURE: 97.8 F | DIASTOLIC BLOOD PRESSURE: 80 MMHG | WEIGHT: 267 LBS | HEART RATE: 72 BPM | HEIGHT: 67 IN

## 2018-07-11 DIAGNOSIS — F41.9 ANXIETY AND DEPRESSION: ICD-10-CM

## 2018-07-11 DIAGNOSIS — E66.01 MORBID OBESITY WITH BMI OF 40.0-44.9, ADULT (HCC): ICD-10-CM

## 2018-07-11 DIAGNOSIS — F51.01 PRIMARY INSOMNIA: ICD-10-CM

## 2018-07-11 DIAGNOSIS — F32.A ANXIETY AND DEPRESSION: ICD-10-CM

## 2018-07-11 PROCEDURE — 99214 OFFICE O/P EST MOD 30 MIN: CPT | Performed by: FAMILY MEDICINE

## 2018-07-11 RX ORDER — PHENTERMINE HYDROCHLORIDE 37.5 MG/1
37.5 TABLET ORAL
Qty: 90 TAB | Refills: 0 | Status: SHIPPED | OUTPATIENT
Start: 2018-07-11 | End: 2018-08-14 | Stop reason: SDUPTHER

## 2018-07-11 NOTE — PROGRESS NOTES
Chief Complaint   Patient presents with   • Obesity   • Hypertension       HISTORY OF PRESENT ILLNESS: Patient is a 38 y.o. female established patient here today for the following concerns:    1. Morbid obesity with BMI of 40.0-44.9, adult ([facility])  Here today for follow up.  She has not been successful at losing weight this last 3 months.  She does have pulmonary HTN and mild TIMI.  She was put on spironolactone and trial of CPAP, but proved intolerant to therapy so far.  She is hoping weight reduction will be helpful to improve sleep quality.     2. Anxiety and depression  She continues on Fluoxetine.  Still struggling at times with difficulty falling asleep and some anxiety.  No SI/HI.    3. Primary insomnia  Continues with difficult falling and staying asleep.  As mentioned above she does have TIMI but intolerant to CPAP therapy.  Did try xanax the other night to try to sleep some, but does not want to start something that is habit forming.         Past Medical, Social, and Family history reviewed and updated in EPIC    Allergies:Erythromycin and Pcn [penicillins]    Current Outpatient Prescriptions   Medication Sig Dispense Refill   • phentermine (ADIPEX-P) 37.5 MG tablet Take 1 Tab by mouth every morning before breakfast for 90 days. 90 Tab 0   • spironolactone (ALDACTONE) 25 MG Tab Take 1 Tab by mouth every day. 90 Tab 3   • levonorgestrel-ethinyl estradiol (TRIVORA, 28,) per tablet Take 1 Tab by mouth every day.     • fluoxetine (PROZAC) 40 MG capsule Take 80 mg by mouth every day.       No current facility-administered medications for this visit.          ROS:  Review of Systems   Constitutional: Negative for fever, chills, weight loss and malaise/fatigue.   HENT: Negative for ear pain, nosebleeds, congestion, sore throat and neck pain.    Eyes: Negative for blurred vision.   Respiratory: Negative for cough, sputum production, shortness of breath and wheezing.    Cardiovascular: Negative for chest pain,  "palpitations,  and leg swelling.   Gastrointestinal: Negative for heartburn, nausea, vomiting, diarrhea and abdominal pain.   Genitourinary: Negative for dysuria, urgency and frequency.   Musculoskeletal: Negative for myalgias, back pain and joint pain.   Skin: Negative for rash and itching.   Neurological: Negative for dizziness, tingling, tremors, sensory change, focal weakness and headaches.   Endo/Heme/Allergies: Does not bruise/bleed easily.   Psychiatric/Behavioral: Negative for depression, anxiety, suicidal ideas, insomnia and memory loss.      Exam:  Blood pressure 110/80, pulse 72, temperature 36.6 °C (97.8 °F), resp. rate 14, height 1.702 m (5' 7\"), weight 121.1 kg (267 lb), SpO2 95 %.    General:  Well nourished, well developed in NAD  Head is grossly normal.  Neck: Supple without JVD   Pulmonary:  Normal effort.   Cardiovascular: Regular rate  Extremities: no clubbing, cyanosis, or edema.  Psych: affect appropriate      Please note that this dictation was created using voice recognition software. I have made every reasonable attempt to correct obvious errors, but I expect that there are errors of grammar and possibly content that I did not discover before finalizing the note.    Assessment/Plan:  1. Morbid obesity with BMI of 40.0-44.9, adult (HCC)  - phentermine (ADIPEX-P) 37.5 MG tablet; Take 1 Tab by mouth every morning before breakfast for 90 days.  Dispense: 90 Tab; Refill: 0    2. Anxiety and depression  Continue fluoxetine 80 mg, xanax prn    3. Primary insomnia  Trial of diphenhydramine .            "

## 2018-08-14 ENCOUNTER — OFFICE VISIT (OUTPATIENT)
Dept: MEDICAL GROUP | Facility: PHYSICIAN GROUP | Age: 38
End: 2018-08-14
Payer: COMMERCIAL

## 2018-08-14 VITALS
RESPIRATION RATE: 14 BRPM | SYSTOLIC BLOOD PRESSURE: 120 MMHG | OXYGEN SATURATION: 98 % | HEIGHT: 67 IN | DIASTOLIC BLOOD PRESSURE: 82 MMHG | HEART RATE: 72 BPM | WEIGHT: 263.8 LBS | TEMPERATURE: 98.2 F | BODY MASS INDEX: 41.4 KG/M2

## 2018-08-14 DIAGNOSIS — I27.20 PULMONARY HYPERTENSION (HCC): ICD-10-CM

## 2018-08-14 DIAGNOSIS — R61 HYPERHIDROSIS: ICD-10-CM

## 2018-08-14 DIAGNOSIS — E66.01 MORBID OBESITY WITH BMI OF 40.0-44.9, ADULT (HCC): ICD-10-CM

## 2018-08-14 PROCEDURE — 99214 OFFICE O/P EST MOD 30 MIN: CPT | Performed by: FAMILY MEDICINE

## 2018-08-14 RX ORDER — PHENTERMINE HYDROCHLORIDE 37.5 MG/1
37.5 TABLET ORAL 2 TIMES DAILY
Qty: 90 TAB | Refills: 0
Start: 2018-08-14 | End: 2018-09-28

## 2018-08-14 NOTE — PROGRESS NOTES
Chief Complaint   Patient presents with   • Hypertension   • Hemorrhoids   • Sweats     sweating all the time       HISTORY OF PRESENT ILLNESS: Patient is a 38 y.o. female established patient here today for the following concerns:    Modesta is here today for follow-up on start of phentermine.  We started with 37.5 once in the morning.  She reports initially the first week she felt like it was helping with her appetite suppression.  She has managed to lose about 4 pounds in this last month.  This is still about 1 pound above the month previous.  She reports that the efficacy seems to have worn off after the first week.  She would like to consider what her options are for additional appetite suppression.  She has noticed some increased sweating which she feels is slightly worse than before she started the medication.  This seems to happen at rest as well.  Previous endocrine workup has been normal.  No shortness of breath or chest pain associated.    In addition is here for follow-up on blood pressure which remains normotensive on spironolactone.  Does have history of pulmonary hypertension which is currently being worked up by pulmonology.  Follow-up is scheduled for next month.    Lastly has been experiencing some hemorrhoids since starting the medication with some constipation.            Past Medical, Social, and Family history reviewed and updated in EPIC    Allergies:Erythromycin and Pcn [penicillins]    Current Outpatient Prescriptions   Medication Sig Dispense Refill   • phentermine (ADIPEX-P) 37.5 MG tablet Take 1 Tab by mouth 2 times a day for 45 days. 90 Tab 0   • spironolactone (ALDACTONE) 25 MG Tab Take 1 Tab by mouth every day. 90 Tab 3   • levonorgestrel-ethinyl estradiol (TRIVORA, 28,) per tablet Take 1 Tab by mouth every day.     • fluoxetine (PROZAC) 40 MG capsule Take 80 mg by mouth every day.       No current facility-administered medications for this visit.          ROS:  Review of Systems  "  Constitutional: Negative for fever, chills, weight loss and malaise/fatigue.   HENT: Negative for ear pain, nosebleeds, congestion, sore throat and neck pain.    Eyes: Negative for blurred vision.   Respiratory: Negative for cough, sputum production, shortness of breath and wheezing.    Cardiovascular: Negative for chest pain, palpitations,  and leg swelling.   Gastrointestinal: Negative for heartburn, nausea, vomiting, diarrhea and abdominal pain.   Genitourinary: Negative for dysuria, urgency and frequency.   Musculoskeletal: Negative for myalgias, back pain and joint pain.   Skin: Negative for rash and itching.   Neurological: Negative for dizziness, tingling, tremors, sensory change, focal weakness and headaches.   Endo/Heme/Allergies: Does not bruise/bleed easily.   Psychiatric/Behavioral: Negative for depression, anxiety, suicidal ideas, insomnia and memory loss.      Exam:  Blood pressure 120/82, pulse 72, temperature 36.8 °C (98.2 °F), resp. rate 14, height 1.702 m (5' 7\"), weight 119.7 kg (263 lb 12.8 oz), SpO2 98 %.    General:  Well nourished, well developed in NAD  Head is grossly normal.  Neck: Supple without JVD   Pulmonary:  Normal effort.   Cardiovascular: Regular rate  Extremities: no clubbing, cyanosis, or edema.  Psych: affect appropriate      Please note that this dictation was created using voice recognition software. I have made every reasonable attempt to correct obvious errors, but I expect that there are errors of grammar and possibly content that I did not discover before finalizing the note.    Assessment/Plan:  1. Morbid obesity with BMI of 40.0-44.9, adult (HCC)  We will temporarily increase to 2 tabs per day.  She will my chart her progress.  - phentermine (ADIPEX-P) 37.5 MG tablet; Take 1 Tab by mouth 2 times a day for 45 days.  Dispense: 90 Tab; Refill: 0    2. Hyperhidrosis  Advise use of Drysol over-the-counter.  There is been no endocrine or lab findings to explain the " hyperhidrosis.    3. Pulmonary hypertension (HCC)  Continue spironolactone, follow-up with pulmonology and cardiology as planned    Follow-up in 3 months sooner as needed

## 2018-08-14 NOTE — LETTER
[employer] [facility]  Quynh Joel M.D.  [address]  [place] [address]  Fax: [phone]   Authorization for Release/Disclosure of   Protected Health Information   Name: MODESTA ABDUL : 1980 SSN: [id number]   Address: [address] [facility] [id number]  [place] [address] Phone:    [phone] (home)    I authorize the entity listed below to release/disclose the PHI below to:   [facility]/Quynh Joel M.D. and Quynh Joel M.D.   Provider or Entity Name:Dr. Delphine Carbone     Address   City, [facility]   Phone:    Fax:   Reason for request: continuity of care   Information to be released:    [  ] LAST COLONOSCOPY,  including any PATH REPORT and follow-up  [  ] LAST FIT/COLOGUARD RESULT [  ] LAST DEXA  [  ] LAST MAMMOGRAM  [X  ] LAST PAP  [  ] LAST LABS [  ] RETINA EXAM REPORT  [  ] IMMUNIZATION RECORDS  [  ] Release all info          DATES OF SERVICE OR TIME PERIOD TO BE DISCLOSED: _____________  I understand and acknowledge that:  * This Authorization may be revoked at any time by you in writing, except if your health information has already been used or disclosed.  * Your health information that will be used or disclosed as a result of you signing this authorization could be re-disclosed by the recipient. If this occurs, your re-disclosed health information may no longer be protected by State or Federal laws.  * You may refuse to sign this Authorization. Your refusal will not affect your ability to obtain treatment.  * This Authorization becomes effective upon signing and will  on (date) __________.      If no date is indicated, this Authorization will  one (1) year from the signature date.    Name: Modesta Abdul       Date: 2018       PLEASE FAX REQUESTED RECORDS BACK TO: [phone]

## 2018-09-13 ENCOUNTER — SLEEP CENTER VISIT (OUTPATIENT)
Dept: SLEEP MEDICINE | Facility: MEDICAL CENTER | Age: 38
End: 2018-09-13
Payer: COMMERCIAL

## 2018-09-13 VITALS
TEMPERATURE: 97.5 F | HEART RATE: 74 BPM | HEIGHT: 67 IN | OXYGEN SATURATION: 97 % | DIASTOLIC BLOOD PRESSURE: 80 MMHG | SYSTOLIC BLOOD PRESSURE: 124 MMHG | WEIGHT: 269 LBS | RESPIRATION RATE: 16 BRPM | BODY MASS INDEX: 42.22 KG/M2

## 2018-09-13 DIAGNOSIS — G47.33 OSA (OBSTRUCTIVE SLEEP APNEA): ICD-10-CM

## 2018-09-13 RX ORDER — ALPRAZOLAM 0.5 MG/1
0.5 TABLET ORAL PRN
COMMUNITY

## 2018-11-19 ENCOUNTER — TELEPHONE (OUTPATIENT)
Dept: MEDICAL GROUP | Facility: PHYSICIAN GROUP | Age: 38
End: 2018-11-19

## 2018-11-19 DIAGNOSIS — R79.89 ELEVATED TROPONIN: ICD-10-CM

## 2018-11-20 NOTE — TELEPHONE ENCOUNTER
Left patient a message that we were calling in regards to a referral. [employer] message sent to the patient.

## 2018-11-20 NOTE — TELEPHONE ENCOUNTER
----- Message from Marcia Mckenzie sent at 11/19/2018 12:39 PM PST -----  Regarding: Referral to Cardiology Needed Please  Pt has appt on 11/27/18 with cardiologist Dr. Troy Lockett. Pt has [facility] HMO which needs pcp referral. Dx is elevated troponin. Please submit.     Please let me know if you have any questions or concerns.    Thanks,    Marcia Mckenzie

## 2018-11-27 ENCOUNTER — OFFICE VISIT (OUTPATIENT)
Dept: CARDIOLOGY | Facility: MEDICAL CENTER | Age: 38
End: 2018-11-27
Payer: COMMERCIAL

## 2018-11-27 VITALS
WEIGHT: 274 LBS | RESPIRATION RATE: 14 BRPM | OXYGEN SATURATION: 96 % | SYSTOLIC BLOOD PRESSURE: 124 MMHG | BODY MASS INDEX: 43 KG/M2 | HEIGHT: 67 IN | DIASTOLIC BLOOD PRESSURE: 80 MMHG | HEART RATE: 78 BPM

## 2018-11-27 DIAGNOSIS — I10 ESSENTIAL HYPERTENSION: ICD-10-CM

## 2018-11-27 DIAGNOSIS — I27.20 PULMONARY HYPERTENSION (HCC): ICD-10-CM

## 2018-11-27 PROCEDURE — 99213 OFFICE O/P EST LOW 20 MIN: CPT | Performed by: INTERNAL MEDICINE

## 2018-11-27 RX ORDER — LOSARTAN POTASSIUM 50 MG/1
50 TABLET ORAL DAILY
Qty: 30 TAB | Refills: 6 | Status: SHIPPED | OUTPATIENT
Start: 2018-11-27

## 2018-11-27 ASSESSMENT — ENCOUNTER SYMPTOMS
INSOMNIA: 1
PALPITATIONS: 0
NERVOUS/ANXIOUS: 1
SHORTNESS OF BREATH: 0
NEUROLOGICAL NEGATIVE: 1
WEIGHT LOSS: 0

## 2018-11-27 NOTE — LETTER
[facility]   [address], [facility] [address]  [place] [address]  Phone: [phone]  Fax: [phone]              Modesta Archuleta  1980    Encounter Date: 11/27/2018    Troy Lockett M.D.          PROGRESS NOTE:  Chief Complaint   Patient presents with   • Pulmonary Hypertension     Yearly check up and changing insurance in the beginning of the year.        Subjective:   Modesta Archuleta is a 38 y.o. female who presents today for follow-up of history of pulmonary hypertension and systemic hypertension.  She is found to have sleep apnea and was previously on a mask.  She just wished a dental appliance.  Blood pressure improved significantly once her sleep apnea was treated.  She is not managed to lose any weight.  Patient has been under significant stress on the home front and is having some insomnia.  She is seeing a therapist.      Past Medical History:   Diagnosis Date   • Anxiety and depression 2/12/2015   • Chickenpox    • Chronic diarrhea 2/12/2015   • Depression    • Ocular migraine 2/12/2015   • Sleep apnea      Past Surgical History:   Procedure Laterality Date   • PRIMARY C SECTION  11/13/2016    Procedure: PRIMARY C SECTION;  Surgeon: Delphine Carbone M.D.;  Location: LABOR AND DELIVERY;  Service:    • ARTHROSCOPY, KNEE     • TONSILLECTOMY       Family History   Problem Relation Age of Onset   • Sleep Apnea Mother    • Hypertension Father    • Heart Disease Father    • DVT Father      Social History     Social History   • Marital status: Single     Spouse name: N/A   • Number of children: N/A   • Years of education: N/A     Occupational History   • Not on file.     Social History Main Topics   • Smoking status: Never Smoker   • Smokeless tobacco: Never Used   • Alcohol use No   • Drug use: No   • Sexual activity: No      Comment:      Other Topics Concern   • Not on file     Social History Narrative   • No narrative on file     Allergies   Allergen  "Reactions   • Erythromycin Unspecified     Pt reports \" crying for no reason, and my jaw locked years ago\"    • Pcn [Penicillins] Hives     Outpatient Encounter Prescriptions as of 11/27/2018   Medication Sig Dispense Refill   • losartan (COZAAR) 50 MG Tab Take 1 Tab by mouth every day. 30 Tab 6   • ALPRAZolam (XANAX) 0.5 MG Tab Take 0.5 mg by mouth as needed for Sleep.     • levonorgestrel-ethinyl estradiol (TRIVORA, 28,) per tablet Take 1 Tab by mouth every day.     • fluoxetine (PROZAC) 40 MG capsule Take 80 mg by mouth every day.     • [DISCONTINUED] spironolactone (ALDACTONE) 25 MG Tab Take 1 Tab by mouth every day. 90 Tab 3     No facility-administered encounter medications on file as of 11/27/2018.      Review of Systems   Constitutional: Negative for weight loss.   Respiratory: Negative for shortness of breath.         History of sleep apnea.   Cardiovascular: Negative for chest pain and palpitations.   Neurological: Negative.    Psychiatric/Behavioral: The patient is nervous/anxious and has insomnia.         Situational stress        Objective:   /80 (BP Location: Left arm, Patient Position: Sitting, BP Cuff Size: Adult)   Pulse 78   Resp 14   Ht 1.702 m (5' 7\")   Wt 124.3 kg (274 lb)   SpO2 96%   BMI 42.91 kg/m²      Physical Exam   Constitutional: She is oriented to person, place, and time. No distress.   Obese   HENT:   Head: Normocephalic and atraumatic.   Eyes: Pupils are equal, round, and reactive to light. No scleral icterus.   Neck: No JVD present.   Cardiovascular: Normal rate and regular rhythm.    No murmur heard.  Pulmonary/Chest: No respiratory distress. She has no wheezes.   Abdominal: Soft. She exhibits no distension. There is no tenderness.   Obese   Musculoskeletal: She exhibits no edema.   Neurological: She is alert and oriented to person, place, and time.   Skin: Skin is warm.   Psychiatric: She has a normal mood and affect.       Assessment:     1. Pulmonary hypertension " (HCC)     2. Essential hypertension         Medical Decision Making:  Today's Assessment / Status / Plan:   Hypertension: Not under perfect control.  Blood pressure using large cuff right arm sitting is 140 systolic by my reading.  We will switch her from spironolactone to losartan 50 mg a day.    Pulmonary hypertension: Secondary to untreated sleep apnea and obesity.  Blood pressure has improved since she has been on therapy.  Recommend repeat echocardiogram to evaluate her pulmonary pressures.  Fortunately, she is changing insurance and will have this done through her new primary provider.  Right heart catheterization performed in October, 2017 revealed pulmonary pressure of 44 mmHg and LVEDP of 21.  Coronary arteries were normal.        Vanessa Nelson M.D.  [address] [address]  VIA Facsimile: [phone]

## 2018-11-27 NOTE — PROGRESS NOTES
"Chief Complaint   Patient presents with   • Pulmonary Hypertension     Yearly check up and changing insurance in the beginning of the year.        Subjective:   Modesta Archuleta is a 38 y.o. female who presents today for follow-up of history of pulmonary hypertension and systemic hypertension.  She is found to have sleep apnea and was previously on a mask.  She just wished a dental appliance.  Blood pressure improved significantly once her sleep apnea was treated.  She is not managed to lose any weight.  Patient has been under significant stress on the home front and is having some insomnia.  She is seeing a therapist.      Past Medical History:   Diagnosis Date   • Anxiety and depression 2/12/2015   • Chickenpox    • Chronic diarrhea 2/12/2015   • Depression    • Ocular migraine 2/12/2015   • Sleep apnea      Past Surgical History:   Procedure Laterality Date   • PRIMARY C SECTION  11/13/2016    Procedure: PRIMARY C SECTION;  Surgeon: Delphine Carbone M.D.;  Location: LABOR AND DELIVERY;  Service:    • ARTHROSCOPY, KNEE     • TONSILLECTOMY       Family History   Problem Relation Age of Onset   • Sleep Apnea Mother    • Hypertension Father    • Heart Disease Father    • DVT Father      Social History     Social History   • Marital status: Single     Spouse name: N/A   • Number of children: N/A   • Years of education: N/A     Occupational History   • Not on file.     Social History Main Topics   • Smoking status: Never Smoker   • Smokeless tobacco: Never Used   • Alcohol use No   • Drug use: No   • Sexual activity: No      Comment:      Other Topics Concern   • Not on file     Social History Narrative   • No narrative on file     Allergies   Allergen Reactions   • Erythromycin Unspecified     Pt reports \" crying for no reason, and my jaw locked years ago\"    • Pcn [Penicillins] Hives     Outpatient Encounter Prescriptions as of 11/27/2018   Medication Sig Dispense Refill   • losartan (COZAAR) 50 MG Tab Take 1 Tab " "by mouth every day. 30 Tab 6   • ALPRAZolam (XANAX) 0.5 MG Tab Take 0.5 mg by mouth as needed for Sleep.     • levonorgestrel-ethinyl estradiol (TRIVORA, 28,) per tablet Take 1 Tab by mouth every day.     • fluoxetine (PROZAC) 40 MG capsule Take 80 mg by mouth every day.     • [DISCONTINUED] spironolactone (ALDACTONE) 25 MG Tab Take 1 Tab by mouth every day. 90 Tab 3     No facility-administered encounter medications on file as of 11/27/2018.      Review of Systems   Constitutional: Negative for weight loss.   Respiratory: Negative for shortness of breath.         History of sleep apnea.   Cardiovascular: Negative for chest pain and palpitations.   Neurological: Negative.    Psychiatric/Behavioral: The patient is nervous/anxious and has insomnia.         Situational stress        Objective:   /80 (BP Location: Left arm, Patient Position: Sitting, BP Cuff Size: Adult)   Pulse 78   Resp 14   Ht 1.702 m (5' 7\")   Wt 124.3 kg (274 lb)   SpO2 96%   BMI 42.91 kg/m²     Physical Exam   Constitutional: She is oriented to person, place, and time. No distress.   Obese   HENT:   Head: Normocephalic and atraumatic.   Eyes: Pupils are equal, round, and reactive to light. No scleral icterus.   Neck: No JVD present.   Cardiovascular: Normal rate and regular rhythm.    No murmur heard.  Pulmonary/Chest: No respiratory distress. She has no wheezes.   Abdominal: Soft. She exhibits no distension. There is no tenderness.   Obese   Musculoskeletal: She exhibits no edema.   Neurological: She is alert and oriented to person, place, and time.   Skin: Skin is warm.   Psychiatric: She has a normal mood and affect.       Assessment:     1. Pulmonary hypertension (HCC)     2. Essential hypertension         Medical Decision Making:  Today's Assessment / Status / Plan:   Hypertension: Not under perfect control.  Blood pressure using large cuff right arm sitting is 140 systolic by my reading.  We will switch her from spironolactone to " losartan 50 mg a day.    Pulmonary hypertension: Secondary to untreated sleep apnea and obesity.  Blood pressure has improved since she has been on therapy.  Recommend repeat echocardiogram to evaluate her pulmonary pressures.  Fortunately, she is changing insurance and will have this done through her new primary provider.  Right heart catheterization performed in October, 2017 revealed pulmonary pressure of 44 mmHg and LVEDP of 21.  Coronary arteries were normal.

## 2021-01-11 ENCOUNTER — HOSPITAL ENCOUNTER (EMERGENCY)
Dept: HOSPITAL 8 - ED | Age: 41
Discharge: HOME | End: 2021-01-11
Payer: COMMERCIAL

## 2021-01-11 VITALS — BODY MASS INDEX: 45.21 KG/M2 | HEIGHT: 66 IN | WEIGHT: 281.31 LBS

## 2021-01-11 VITALS — DIASTOLIC BLOOD PRESSURE: 79 MMHG | SYSTOLIC BLOOD PRESSURE: 128 MMHG

## 2021-01-11 DIAGNOSIS — R12: ICD-10-CM

## 2021-01-11 DIAGNOSIS — K92.1: ICD-10-CM

## 2021-01-11 DIAGNOSIS — A09: Primary | ICD-10-CM

## 2021-01-11 LAB
ALBUMIN SERPL-MCNC: 4 G/DL (ref 3.4–5)
ALP SERPL-CCNC: 126 U/L (ref 45–117)
ALT SERPL-CCNC: 50 U/L (ref 12–78)
ANION GAP SERPL CALC-SCNC: 9 MMOL/L (ref 5–15)
BASOPHILS # BLD AUTO: 0.1 X10^3/UL (ref 0–0.1)
BASOPHILS NFR BLD AUTO: 1 % (ref 0–1)
BILIRUB SERPL-MCNC: 0.7 MG/DL (ref 0.2–1)
CALCIUM SERPL-MCNC: 9.2 MG/DL (ref 8.5–10.1)
CHLORIDE SERPL-SCNC: 108 MMOL/L (ref 98–107)
CREAT SERPL-MCNC: 0.97 MG/DL (ref 0.55–1.02)
EOSINOPHIL # BLD AUTO: 1.3 X10^3/UL (ref 0–0.4)
EOSINOPHIL NFR BLD AUTO: 9 % (ref 1–7)
ERYTHROCYTE [DISTWIDTH] IN BLOOD BY AUTOMATED COUNT: 13.5 % (ref 9.6–15.2)
HCG UR SG: 1.01 (ref 1–1.03)
LYMPHOCYTES # BLD AUTO: 2.6 X10^3/UL (ref 1–3.4)
LYMPHOCYTES NFR BLD AUTO: 19 % (ref 22–44)
MCH RBC QN AUTO: 30.1 PG (ref 27–34.8)
MCHC RBC AUTO-ENTMCNC: 34 G/DL (ref 32.4–35.8)
MD: NO
MICROSCOPIC: (no result)
MONOCYTES # BLD AUTO: 0.7 X10^3/UL (ref 0.2–0.8)
MONOCYTES NFR BLD AUTO: 5 % (ref 2–9)
NEUTROPHILS # BLD AUTO: 8.8 X10^3/UL (ref 1.8–6.8)
NEUTROPHILS NFR BLD AUTO: 65 % (ref 42–75)
PLATELET # BLD AUTO: 351 X10^3/UL (ref 130–400)
PMV BLD AUTO: 8.3 FL (ref 7.4–10.4)
PROT SERPL-MCNC: 8 G/DL (ref 6.4–8.2)
RBC # BLD AUTO: 4.84 X10^6/UL (ref 3.82–5.3)

## 2021-01-11 PROCEDURE — 96361 HYDRATE IV INFUSION ADD-ON: CPT

## 2021-01-11 PROCEDURE — 83690 ASSAY OF LIPASE: CPT

## 2021-01-11 PROCEDURE — 80053 COMPREHEN METABOLIC PANEL: CPT

## 2021-01-11 PROCEDURE — 87086 URINE CULTURE/COLONY COUNT: CPT

## 2021-01-11 PROCEDURE — 85025 COMPLETE CBC W/AUTO DIFF WBC: CPT

## 2021-01-11 PROCEDURE — 81001 URINALYSIS AUTO W/SCOPE: CPT

## 2021-01-11 PROCEDURE — 36415 COLL VENOUS BLD VENIPUNCTURE: CPT

## 2021-01-11 PROCEDURE — 74177 CT ABD & PELVIS W/CONTRAST: CPT

## 2021-01-11 PROCEDURE — 87147 CULTURE TYPE IMMUNOLOGIC: CPT

## 2021-01-11 PROCEDURE — 96376 TX/PRO/DX INJ SAME DRUG ADON: CPT

## 2021-01-11 PROCEDURE — 99285 EMERGENCY DEPT VISIT HI MDM: CPT

## 2021-01-11 PROCEDURE — 96375 TX/PRO/DX INJ NEW DRUG ADDON: CPT

## 2021-01-11 PROCEDURE — 81025 URINE PREGNANCY TEST: CPT

## 2021-01-11 PROCEDURE — 96374 THER/PROPH/DIAG INJ IV PUSH: CPT

## 2021-01-11 RX ADMIN — MORPHINE SULFATE PRN MG: 4 INJECTION INTRAVENOUS at 11:38

## 2021-01-11 RX ADMIN — MORPHINE SULFATE PRN MG: 4 INJECTION INTRAVENOUS at 10:40

## 2023-01-01 NOTE — PROGRESS NOTES
Tele Summary Monitor  SB-SR [id number]  .16/.08/.42    Normal vision: sees adequately in most situations; can see medication labels, newsprint

## 2025-05-15 ENCOUNTER — TELEPHONE (OUTPATIENT)
Facility: MEDICAL CENTER | Age: 45
End: 2025-05-15

## 2025-05-15 NOTE — TELEPHONE ENCOUNTER
Attempted to contact LD about referral form she completed. It should be noted that she has a history of HTN and is potentially still on [place].     Left a detailed message and awaits call back. SW remains available.

## 2025-05-29 ENCOUNTER — TELEPHONE (OUTPATIENT)
Facility: MEDICAL CENTER | Age: 45
End: 2025-05-29

## 2025-05-29 NOTE — TELEPHONE ENCOUNTER
Attempted to contact potential LD after they completed the referral form. Potential LD answered the phone but reported she had just had mouth surgery and was feeling out of it still, reported Thursdays are the best day for a call back. RTI to call back next week to attempt LD intake again.     Phoebe Monterroso    [facility]

## 2025-06-05 ENCOUNTER — TELEPHONE (OUTPATIENT)
Facility: MEDICAL CENTER | Age: 45
End: 2025-06-05

## 2025-06-05 NOTE — TELEPHONE ENCOUNTER
Attempted to contact potential LD after they completed the referral form. Left a detailed message w/ return call instructions.